# Patient Record
Sex: MALE | Race: WHITE | NOT HISPANIC OR LATINO | Employment: OTHER | URBAN - METROPOLITAN AREA
[De-identification: names, ages, dates, MRNs, and addresses within clinical notes are randomized per-mention and may not be internally consistent; named-entity substitution may affect disease eponyms.]

---

## 2017-03-13 ENCOUNTER — GENERIC CONVERSION - ENCOUNTER (OUTPATIENT)
Dept: FAMILY MEDICINE CLINIC | Facility: CLINIC | Age: 67
End: 2017-03-13

## 2017-03-13 ENCOUNTER — GENERIC CONVERSION - ENCOUNTER (OUTPATIENT)
Dept: OTHER | Facility: OTHER | Age: 67
End: 2017-03-13

## 2017-03-30 ENCOUNTER — ALLSCRIPTS OFFICE VISIT (OUTPATIENT)
Dept: OTHER | Facility: OTHER | Age: 67
End: 2017-03-30

## 2017-11-18 LAB
A/G RATIO (HISTORICAL): 1.6 (CALC) (ref 1–2.5)
ALBUMIN SERPL BCP-MCNC: 4.5 G/DL (ref 3.6–5.1)
ALP SERPL-CCNC: 55 U/L (ref 40–115)
ALT SERPL W P-5'-P-CCNC: 29 U/L (ref 9–46)
AST SERPL W P-5'-P-CCNC: 16 U/L (ref 10–35)
BILIRUB SERPL-MCNC: 1.5 MG/DL (ref 0.2–1.2)
BUN SERPL-MCNC: 16 MG/DL (ref 7–25)
BUN/CREA RATIO (HISTORICAL): ABNORMAL (CALC) (ref 6–22)
CALCIUM (ADJUSTED FOR ALBUMIN) (HISTORICAL): 9.2 MG/DL (CALC) (ref 8.6–10.2)
CALCIUM SERPL-MCNC: 9.3 MG/DL (ref 8.6–10.3)
CHLORIDE SERPL-SCNC: 102 MMOL/L (ref 98–110)
CHOLEST SERPL-MCNC: 182 MG/DL
CHOLEST/HDLC SERPL: 4.3 (CALC)
CO2 SERPL-SCNC: 27 MMOL/L (ref 20–31)
CREAT SERPL-MCNC: 0.89 MG/DL (ref 0.7–1.25)
EGFR AFRICAN AMERICAN (HISTORICAL): 103 ML/MIN/1.73M2
EGFR-AMERICAN CALC (HISTORICAL): 89 ML/MIN/1.73M2
EST. AVERAGE GLUCOSE BLD GHB EST-MCNC: 177 (CALC)
EST. AVERAGE GLUCOSE BLD GHB EST-MCNC: 9.8 (CALC)
GAMMA GLOBULIN (HISTORICAL): 2.8 G/DL (CALC) (ref 1.9–3.7)
GLUCOSE (HISTORICAL): 189 MG/DL (ref 65–99)
HBA1C MFR BLD HPLC: 7.8 % OF TOTAL HGB
HDLC SERPL-MCNC: 42 MG/DL
LDL CHOLESTEROL (HISTORICAL): 103 MG/DL (CALC)
NON-HDL-CHOL (CHOL-HDL) (HISTORICAL): 140 MG/DL (CALC)
POTASSIUM SERPL-SCNC: 3.9 MMOL/L (ref 3.5–5.3)
SODIUM SERPL-SCNC: 139 MMOL/L (ref 135–146)
TOTAL PROTEIN (HISTORICAL): 7.3 G/DL (ref 6.1–8.1)
TRIGL SERPL-MCNC: 247 MG/DL

## 2017-12-04 ENCOUNTER — ALLSCRIPTS OFFICE VISIT (OUTPATIENT)
Dept: OTHER | Facility: OTHER | Age: 67
End: 2017-12-04

## 2017-12-06 NOTE — PROGRESS NOTES
Assessment    1  Benign essential hypertension (401 1) (I10)   2  Diabetes mellitus (250 00) (E11 9)   3  Combined hyperlipidemia (272 2) (E78 2)   4  Change in pigmented skin lesion of face (709 00) (L81 9)    Plan  Anxiety, Benign essential hypertension, PMH: CABG, Diabetes mellitus    · (1) CBC/PLT/DIFF; Status:Hold For - Exact Date; Requested for:After [id number];    · (1) COMPREHENSIVE METABOLIC PANEL; Status:Hold For - Exact Date; Requestedfor:After [id number];    · (1) HEMOGLOBIN A1C; Status:Hold For - Exact Date; Requested for:After [id number];    · (1) LIPID PANEL FASTING W DIRECT LDL REFLEX; Status:Hold For - Exact Date; Requested for:After [id number];    · (1) MICROALBUMIN CREATININE RATIO, RANDOM URINE; Status:Hold For - Exact Date; Requested for:After [id number];    · (1) TSH WITH FT4 REFLEX; Status:Hold For - Exact Date; Requested for:[id number]; Anxiety, Benign essential hypertension, PMH: CABG, Diabetes mellitus, PMH: Encounterfor prostate cancer screening    · (1) PSA (SCREEN) (Dx V76 44 Screen for Prostate Cancer); Status:Hold For - Exact Date; Requested for:After [id number];   Combined hyperlipidemia    · Atorvastatin Calcium 20 MG Oral Tablet; TAKE 1 TABLET DAILY AS DIRECTED  Combined hyperlipidemia, Diabetes mellitus    · (1) COMPREHENSIVE METABOLIC PANEL; Status:Hold For - Exact Date; Requestedfor:After [id number];    · (1) HEMOGLOBIN A1C; Status:Hold For - Exact Date; Requested for:After [id number];    · (1) LIPID PANEL FASTING W DIRECT LDL REFLEX; Status:Hold For - Exact Date; Requested for:After [id number];     Discussion/Summary    --DM: A1c 7 8%, was 7 2%  Patient states his diet has not been good lately with holloween  Continue same meds for now; glimepiride 8 mg and metformin 2000 mg  (Patient still has [employer] license)Cholesterol 678/133, triglycerides 247  Patient is agreeable to starting a statin today  Will start atorvastatin 20 mg daily  healing lesion L side face:  This is been present for at least a year and is nonhealing  Recommend schedule punch biopsy in near futureReasonably controlled on amlodipine/benazepril 10/20  in 4 months, following fasting blood work (for regular appointment and Medicare wellness visit)  Possible side effects of new medications were reviewed with the patient/guardian today  The treatment plan was reviewed with the patient/guardian  The patient/guardian understands and agrees with the treatment plan      Chief Complaint  Patient presents for a med check and to review BW results  Patient is here today for follow up of chronic conditions described in HPI  Review of Systems   Constitutional: No fever or chills, feels well, no tiredness, no recent weight gain or weight loss  Cardiovascular: No complaints of slow heart rate, no fast heart rate, no chest pain, no palpitations, no leg claudication, no lower extremity  Respiratory: No complaints of shortness of breath, no wheezing, no cough, no SOB on exertion, no orthopnea or PND  Gastrointestinal: No complaints of abdominal pain, no constipation, no nausea or vomiting, no diarrhea or bloody stools  Genitourinary: No complaints of dysuria, no incontinence, no hesitancy, no nocturia, no genital lesion, no testicular pain  Active Problems  1  Allergic rhinitis (477 9) (J30 9)   2  Anxiety (300 00) (F41 9)   3  Benign essential hypertension (401 1) (I10)   4  Diabetes mellitus (250 00) (E11 9)    Past Medical History    1  History of Acute bronchitis (466 0) (J20 9)   2  History of Cellulitis (682 9) (L03 90)   3  History of Contact dermatitis due to poison ivy (692 6) (L23 7)   4  History of Diabetes Mellitus (250 00)   5  History of Need for prophylactic vaccination and inoculation against influenza (V04 81) (Z23)   6  History of Screen for colon cancer (V76 51) (Z12 11)   7  History of Source Of Patient Information Was Medical Records   8   History of Visit For:    The active problems and past medical history were reviewed and updated today  Family History  Family History    1  Family history of No Significant Family History    Social History     · Former smoker (M81 84) (W47 820)  The social history was reviewed and updated today  The social history was reviewed and is unchanged  Current Meds   1  Amlodipine Besy-Benazepril HCl - 10-20 MG Oral Capsule; TAKE ONE CAPSULE BY MOUTH EVERY DAY; Therapy: [id number] to (Evaluate:18Apr2018)  Requested for: 47UWU0766; Last Rx:[id number] Ordered   2  Glimepiride 4 MG Oral Tablet; take 1 tablet by mouth twice a day; Therapy: 55BGC0467 to (Evaluate:18Apr2018)  Requested for: 00DOY5620; Last Rx:[id number] Ordered   3  MetFORMIN HCl - 1000 MG Oral Tablet; take 1 tablet by mouth twice a day; Therapy: 17CCU7468 to (Evaluate:18Apr2018)  Requested for: 45GHA1542; Last Rx:[id number] Ordered    The medication list was reviewed and updated today  Allergies  1  No Known Drug Allergies    Vitals  Vital Signs    Recorded: 84FBE5451 02:09PM   Temperature 98 F, Tympanic   Heart Rate 99   Pulse Quality Normal   Systolic 414, LUE, Sitting   Diastolic 78, LUE, Sitting   BP CUFF SIZE Large   Height 6 ft 3 in   Weight 276 lb 6 oz   BMI Calculated 34 54   BSA Calculated 2 52   O2 Saturation 97, RA       Physical Exam   Constitutional  General appearance: No acute distress, well appearing and well nourished  Pulmonary  Respiratory effort: No increased work of breathing or signs of respiratory distress  Auscultation of lungs: Clear to auscultation, equal breath sounds bilaterally, no wheezes, no rales, no rhonci  Cardiovascular  Palpation of heart: Normal PMI, no thrills  Auscultation of heart: Normal rate and rhythm, normal S1 and S2, without murmurs  Examination of extremities for edema and/or varicosities: Normal    Carotid pulses: Normal    Lymphatic  Palpation of lymph nodes in neck: No lymphadenopathy     Psychiatric  Orientation to person, place and time: Normal    Mood and affect: Normal          Signatures   Electronically signed by :  Jonathan Oh DO; Dec  4 2017  2:48PM EST                       (Author)

## 2017-12-14 ENCOUNTER — GENERIC CONVERSION - ENCOUNTER (OUTPATIENT)
Dept: OTHER | Facility: OTHER | Age: 67
End: 2017-12-14

## 2017-12-14 PROCEDURE — 88305 TISSUE EXAM BY PATHOLOGIST: CPT | Performed by: FAMILY MEDICINE

## 2017-12-15 ENCOUNTER — LAB REQUISITION (OUTPATIENT)
Dept: LAB | Facility: HOSPITAL | Age: 67
End: 2017-12-15
Payer: COMMERCIAL

## 2017-12-15 DIAGNOSIS — L81.9 DISORDER OF PIGMENTATION: ICD-10-CM

## 2017-12-21 ENCOUNTER — GENERIC CONVERSION - ENCOUNTER (OUTPATIENT)
Dept: OTHER | Facility: OTHER | Age: 67
End: 2017-12-21

## 2017-12-28 ENCOUNTER — GENERIC CONVERSION - ENCOUNTER (OUTPATIENT)
Dept: FAMILY MEDICINE CLINIC | Facility: CLINIC | Age: 67
End: 2017-12-28

## 2017-12-28 ENCOUNTER — GENERIC CONVERSION - ENCOUNTER (OUTPATIENT)
Dept: OTHER | Facility: OTHER | Age: 67
End: 2017-12-28

## 2018-01-13 VITALS
SYSTOLIC BLOOD PRESSURE: 136 MMHG | HEART RATE: 80 BPM | OXYGEN SATURATION: 96 % | DIASTOLIC BLOOD PRESSURE: 74 MMHG | TEMPERATURE: 97.8 F | BODY MASS INDEX: 34.38 KG/M2 | RESPIRATION RATE: 18 BRPM | HEIGHT: 75 IN | WEIGHT: 276.5 LBS

## 2018-01-23 VITALS
TEMPERATURE: 98 F | HEART RATE: 99 BPM | OXYGEN SATURATION: 97 % | SYSTOLIC BLOOD PRESSURE: 152 MMHG | HEIGHT: 75 IN | DIASTOLIC BLOOD PRESSURE: 78 MMHG | WEIGHT: 276.38 LBS | BODY MASS INDEX: 34.36 KG/M2

## 2018-01-24 VITALS
TEMPERATURE: 99 F | WEIGHT: 277 LBS | HEIGHT: 75 IN | SYSTOLIC BLOOD PRESSURE: 148 MMHG | HEART RATE: 103 BPM | DIASTOLIC BLOOD PRESSURE: 70 MMHG | RESPIRATION RATE: 18 BRPM | BODY MASS INDEX: 34.44 KG/M2 | OXYGEN SATURATION: 97 %

## 2018-01-24 VITALS
SYSTOLIC BLOOD PRESSURE: 146 MMHG | WEIGHT: 276.25 LBS | BODY MASS INDEX: 34.53 KG/M2 | OXYGEN SATURATION: 96 % | RESPIRATION RATE: 17 BRPM | DIASTOLIC BLOOD PRESSURE: 76 MMHG | TEMPERATURE: 98.2 F | HEART RATE: 100 BPM

## 2018-01-26 ENCOUNTER — TELEPHONE (OUTPATIENT)
Dept: FAMILY MEDICINE CLINIC | Facility: CLINIC | Age: 68
End: 2018-01-26

## 2018-01-26 DIAGNOSIS — E78.2 MIXED HYPERLIPIDEMIA: ICD-10-CM

## 2018-01-26 DIAGNOSIS — I10 ESSENTIAL HYPERTENSION: ICD-10-CM

## 2018-01-26 DIAGNOSIS — E11.9 TYPE 2 DIABETES MELLITUS WITHOUT COMPLICATION, WITHOUT LONG-TERM CURRENT USE OF INSULIN (HCC): Primary | ICD-10-CM

## 2018-01-26 PROBLEM — C44.310 BASAL CELL CARCINOMA OF FACE: Status: ACTIVE | Noted: 2017-12-21

## 2018-01-26 RX ORDER — AMLODIPINE BESYLATE AND BENAZEPRIL HYDROCHLORIDE 10; 20 MG/1; MG/1
1 CAPSULE ORAL DAILY
COMMUNITY
Start: 2013-10-18 | End: 2018-03-12 | Stop reason: SDUPTHER

## 2018-01-26 RX ORDER — ATORVASTATIN CALCIUM 20 MG/1
1 TABLET, FILM COATED ORAL DAILY
COMMUNITY
Start: 2017-12-04 | End: 2018-03-12 | Stop reason: SDUPTHER

## 2018-01-26 RX ORDER — GLIMEPIRIDE 4 MG/1
1 TABLET ORAL 2 TIMES DAILY
COMMUNITY
Start: 2013-07-03 | End: 2018-03-12 | Stop reason: SDUPTHER

## 2018-01-26 NOTE — TELEPHONE ENCOUNTER
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

## 2018-03-12 DIAGNOSIS — E11.8 TYPE 2 DIABETES MELLITUS WITH COMPLICATION, UNSPECIFIED LONG TERM INSULIN USE STATUS: Primary | ICD-10-CM

## 2018-03-12 DIAGNOSIS — E78.2 MIXED HYPERLIPIDEMIA: ICD-10-CM

## 2018-03-12 DIAGNOSIS — I10 ESSENTIAL HYPERTENSION: ICD-10-CM

## 2018-03-12 RX ORDER — GLIMEPIRIDE 4 MG/1
4 TABLET ORAL 2 TIMES DAILY
Qty: 60 TABLET | Refills: 2 | Status: SHIPPED | OUTPATIENT
Start: 2018-03-12 | End: 2018-05-15 | Stop reason: SDUPTHER

## 2018-03-12 RX ORDER — AMLODIPINE BESYLATE AND BENAZEPRIL HYDROCHLORIDE 10; 20 MG/1; MG/1
1 CAPSULE ORAL DAILY
Qty: 30 CAPSULE | Refills: 2 | Status: SHIPPED | OUTPATIENT
Start: 2018-03-12 | End: 2018-04-05 | Stop reason: SDUPTHER

## 2018-03-12 RX ORDER — ATORVASTATIN CALCIUM 20 MG/1
20 TABLET, FILM COATED ORAL DAILY
Qty: 30 TABLET | Refills: 2 | Status: SHIPPED | OUTPATIENT
Start: 2018-03-12 | End: 2018-08-28 | Stop reason: SDUPTHER

## 2018-03-18 LAB
LEFT EYE DIABETIC RETINOPATHY: NORMAL
RIGHT EYE DIABETIC RETINOPATHY: NORMAL

## 2018-03-18 PROCEDURE — 3072F LOW RISK FOR RETINOPATHY: CPT | Performed by: FAMILY MEDICINE

## 2018-03-30 LAB
ALBUMIN SERPL-MCNC: 4.3 G/DL (ref 3.6–5.1)
ALBUMIN/GLOB SERPL: 1.6 (CALC) (ref 1–2.5)
ALP SERPL-CCNC: 62 U/L (ref 40–115)
ALT SERPL-CCNC: 33 U/L (ref 9–46)
AST SERPL-CCNC: 19 U/L (ref 10–35)
BASOPHILS # BLD AUTO: 61 CELLS/UL (ref 0–200)
BASOPHILS NFR BLD AUTO: 1 %
BILIRUB SERPL-MCNC: 1.3 MG/DL (ref 0.2–1.2)
BUN SERPL-MCNC: 19 MG/DL (ref 7–25)
BUN/CREAT SERPL: ABNORMAL (CALC) (ref 6–22)
CALCIUM SERPL-MCNC: 9.4 MG/DL (ref 8.6–10.3)
CHLORIDE SERPL-SCNC: 104 MMOL/L (ref 98–110)
CHOLEST SERPL-MCNC: 110 MG/DL
CHOLEST/HDLC SERPL: 2.6 (CALC)
CO2 SERPL-SCNC: 29 MMOL/L (ref 20–31)
CREAT SERPL-MCNC: 0.93 MG/DL (ref 0.7–1.25)
EOSINOPHIL # BLD AUTO: 140 CELLS/UL (ref 15–500)
EOSINOPHIL NFR BLD AUTO: 2.3 %
ERYTHROCYTE [DISTWIDTH] IN BLOOD BY AUTOMATED COUNT: 13.1 % (ref 11–15)
GLOBULIN SER CALC-MCNC: 2.7 G/DL (CALC) (ref 1.9–3.7)
GLUCOSE SERPL-MCNC: 165 MG/DL (ref 65–99)
HBA1C MFR BLD: 8.2 % OF TOTAL HGB
HCT VFR BLD AUTO: 48 % (ref 38.5–50)
HDLC SERPL-MCNC: 43 MG/DL
HGB BLD-MCNC: 16.6 G/DL (ref 13.2–17.1)
LDLC SERPL CALC-MCNC: 43 MG/DL (CALC)
LYMPHOCYTES # BLD AUTO: 1928 CELLS/UL (ref 850–3900)
LYMPHOCYTES NFR BLD AUTO: 31.6 %
MCH RBC QN AUTO: 32 PG (ref 27–33)
MCHC RBC AUTO-ENTMCNC: 34.6 G/DL (ref 32–36)
MCV RBC AUTO: 92.7 FL (ref 80–100)
MONOCYTES # BLD AUTO: 586 CELLS/UL (ref 200–950)
MONOCYTES NFR BLD AUTO: 9.6 %
NEUTROPHILS # BLD AUTO: 3386 CELLS/UL (ref 1500–7800)
NEUTROPHILS NFR BLD AUTO: 55.5 %
NONHDLC SERPL-MCNC: 67 MG/DL (CALC)
PLATELET # BLD AUTO: 174 THOUSAND/UL (ref 140–400)
PMV BLD REES-ECKER: 9.8 FL (ref 7.5–12.5)
POTASSIUM SERPL-SCNC: 4.4 MMOL/L (ref 3.5–5.3)
PROT SERPL-MCNC: 7 G/DL (ref 6.1–8.1)
RBC # BLD AUTO: 5.18 MILLION/UL (ref 4.2–5.8)
SL AMB EGFR AFRICAN AMERICAN: 99 ML/MIN/1.73M2
SL AMB EGFR NON AFRICAN AMERICAN: 85 ML/MIN/1.73M2
SODIUM SERPL-SCNC: 141 MMOL/L (ref 135–146)
TRIGL SERPL-MCNC: 159 MG/DL
TSH SERPL-ACNC: 2.17 MIU/L (ref 0.4–4.5)
WBC # BLD AUTO: 6.1 THOUSAND/UL (ref 3.8–10.8)

## 2018-04-05 ENCOUNTER — OFFICE VISIT (OUTPATIENT)
Dept: FAMILY MEDICINE CLINIC | Facility: CLINIC | Age: 68
End: 2018-04-05
Payer: COMMERCIAL

## 2018-04-05 VITALS
TEMPERATURE: 98.2 F | BODY MASS INDEX: 34.39 KG/M2 | SYSTOLIC BLOOD PRESSURE: 124 MMHG | OXYGEN SATURATION: 95 % | WEIGHT: 275.1 LBS | DIASTOLIC BLOOD PRESSURE: 68 MMHG | HEART RATE: 95 BPM

## 2018-04-05 DIAGNOSIS — C44.310 BASAL CELL CARCINOMA OF FACE: Primary | ICD-10-CM

## 2018-04-05 DIAGNOSIS — E78.2 COMBINED HYPERLIPIDEMIA: ICD-10-CM

## 2018-04-05 DIAGNOSIS — Z00.00 WELCOME TO MEDICARE PREVENTIVE VISIT: ICD-10-CM

## 2018-04-05 DIAGNOSIS — I10 BENIGN ESSENTIAL HYPERTENSION: ICD-10-CM

## 2018-04-05 DIAGNOSIS — E11.8 TYPE 2 DIABETES MELLITUS WITH COMPLICATION, UNSPECIFIED LONG TERM INSULIN USE STATUS: ICD-10-CM

## 2018-04-05 PROCEDURE — G0438 PPPS, INITIAL VISIT: HCPCS | Performed by: FAMILY MEDICINE

## 2018-04-05 PROCEDURE — 3725F SCREEN DEPRESSION PERFORMED: CPT | Performed by: FAMILY MEDICINE

## 2018-04-05 PROCEDURE — 1101F PT FALLS ASSESS-DOCD LE1/YR: CPT | Performed by: FAMILY MEDICINE

## 2018-04-05 PROCEDURE — 99214 OFFICE O/P EST MOD 30 MIN: CPT | Performed by: FAMILY MEDICINE

## 2018-04-05 RX ORDER — AMLODIPINE BESYLATE 10 MG/1
10 TABLET ORAL DAILY
Qty: 30 TABLET | Refills: 5 | Status: SHIPPED | OUTPATIENT
Start: 2018-04-05 | End: 2018-05-29 | Stop reason: SDUPTHER

## 2018-04-05 RX ORDER — BENAZEPRIL HYDROCHLORIDE 20 MG/1
20 TABLET ORAL DAILY
Qty: 30 TABLET | Refills: 5 | Status: SHIPPED | OUTPATIENT
Start: 2018-04-05 | End: 2018-05-29 | Stop reason: SDUPTHER

## 2018-04-05 NOTE — ASSESSMENT & PLAN NOTE
Cholesterol 110/43  Much improved since starting statin, atorvastatin 20 mg daily    Will continue to monitor

## 2018-04-05 NOTE — PROGRESS NOTES
Assessment/Plan:    Basal cell carcinoma of face  Fully excised  Patient no longer seeing Plastic surgery  Recommend sun precautions    Benign essential hypertension  Originally high, but blood pressure improved after patient sat in room for approximately 15 minutes  Recommend continue amlodipine 10 and benazepril 20  Combined hyperlipidemia  Cholesterol 110/43  Much improved since starting statin, atorvastatin 20 mg daily  Will continue to monitor    Diabetes mellitus ([facility] [address] )  Still suboptimal   A1c now 8 2%, was 7 8%  Patient states his diet is still poor  His current meds include glimepiride 8 mg daily and metformin 2000 mg daily  Patient has a [employer] license, therefore we have been somewhat restricted on his choice of medications  Will refer to Endocrinology for further management    Welcome to Medicare preventive visit  Is a welcome to Medicare physical   Depression screen fall risk assessment were negative visual screening was performed today  Patient was given a living will and healthcare power of  along with freezer packet today  Patient continues to refuse all age appropriate vaccinations, colonoscopy, and PSA screening       Diagnoses and all orders for this visit:    Basal cell carcinoma of face    Benign essential hypertension  -     amLODIPine (NORVASC) 10 mg tablet; Take 1 tablet (10 mg total) by mouth daily  -     benazepril (LOTENSIN) 20 mg tablet; Take 1 tablet (20 mg total) by mouth daily    Type 2 diabetes mellitus with complication, unspecified long term insulin use status (HCC)  -     Comprehensive metabolic panel; Future  -     HEMOGLOBIN A1C W/ EAG ESTIMATION; Future  -     Microalbumin / creatinine urine ratio; Future  -     Ambulatory referral to Endocrinology; Future    Combined hyperlipidemia  -     Lipid Panel with Direct LDL reflex;  Future    Welcome to Medicare preventive visit        Four months, fasting blood work at [place] prior     Subjective:      Patient ID: Pearl Preciado Cecilia Keane is a 78 y o  male  Recheck chronic medical probs today  Overall feeling well  Doing well on all prescribed meds; atorvastatin 20 for chol, amlodipine/benazepril for bp, metformin and glimeperide for Dm  Pt had labs done at [facility]          The following portions of the patient's history were reviewed and updated as appropriate: allergies, current medications, past family history, past medical history, past social history, past surgical history and problem list     Review of Systems   Respiratory: Negative  Cardiovascular: Negative  Gastrointestinal: Negative  Genitourinary: Negative  Objective:      /68   Pulse 95   Temp 98 2 °F (36 8 °C) (Tympanic)   Wt 125 kg (275 lb 1 6 oz)   SpO2 95%   BMI 34 39 kg/m²          Physical Exam   Cardiovascular: Normal rate, regular rhythm, normal heart sounds and intact distal pulses  Carotids: no bruits  Ext: no edema   Pulmonary/Chest: Effort normal  No respiratory distress  He has no wheezes  He has no rales  Psychiatric: He has a normal mood and affect  His behavior is normal  Thought content normal            HPI:  Lb Jean is a 78 y o  male here for his IPPE(Welcome to Medicare Visit )    Patient Active Problem List   Diagnosis    Allergic rhinitis    Anxiety    Basal cell carcinoma of face    Benign essential hypertension    Coronary artery disease with history of myocardial infarction without history of CABG    Combined hyperlipidemia    [facility]    Welcome to Medicare preventive visit     Past Medical History:   Diagnosis Date    Cellulitis     last assessed 7/3/13    Diabetes ([facility] [address] )     last assessed 4/10/13    Gastroenteritis     last assessed 3/30/17    Rhus dermatitis     last assessed 8/26/15    Skin lesion of face     last assessed 3/30/17     History reviewed  No pertinent surgical history    Family History   Problem Relation Age of Onset    No Known Problems Family      History Smoking Status    Former Smoker   Smokeless Tobacco    Never Used     History   Alcohol Use    Yes      History   Drug Use No     /68   Pulse 95   Temp 98 2 °F (36 8 °C) (Tympanic)   Wt 125 kg (275 lb 1 6 oz)   SpO2 95%   BMI 34 39 kg/m²       Current Outpatient Prescriptions   Medication Sig Dispense Refill    atorvastatin (LIPITOR) 20 mg tablet Take 1 tablet (20 mg total) by mouth daily 30 tablet 2    glimepiride (AMARYL) 4 mg tablet Take 1 tablet (4 mg total) by mouth 2 (two) times a day 60 tablet 2    metFORMIN (GLUCOPHAGE) 1000 MG tablet Take 1 tablet (1,000 mg total) by mouth 2 (two) times a day 60 tablet 2    amLODIPine (NORVASC) 10 mg tablet Take 1 tablet (10 mg total) by mouth daily 30 tablet 5    benazepril (LOTENSIN) 20 mg tablet Take 1 tablet (20 mg total) by mouth daily 30 tablet 5     No current facility-administered medications for this visit  No Known Allergies  There is no immunization history for the selected administration types on file for this patient      Patient Care Team:  Jamie Villegas DO as PCP - General    Medicare Screening Tests and Risk Assessments:  AWV Clinical     ISAR:   Previous hospitalizations?:  No       Once in a Lifetime Medicare Screening:       Medicare Screening Tests and Risk Assessment:   AAA Risk Assessment    Age over 72 (males only):  Yes    Osteoporosis Risk Assessment    :  Yes    Age over 48:  Yes    HIV Risk Assessment    None indicated:  Yes        Drug and Alcohol Use:   Tobacco use    Cigarettes:  former smoker    Smokeless:  never used smokeless tobacco    Tobacco use duration    Tobacco Cessation Readiness    Alcohol use    Alcohol use:  rare use    Alcohol Treatment Readiness   Illicit Drug Use    Drug use:  never    Drug type:  no sedative use       Diet & Exercise:   Diet   What is your diet?:  Low Saturated Fat, Limited junk food, No Added Salt   How many servings a day of the following:   Fruits and Vegetables:  1-2 Meat: 1-2   Whole Grains:  1 Simple Carbs:  0   Dairy:  1 Soda:  0   Coffee:  0 Tea:  0   Exercise    Do you currently exercise?:  yes    Frequency:  occasional    Type of exercise:  walking       Cognitive Impairment Screening:   Depression screening preformed:  Yes Depression screen score:  0   Depression screening results:  negative for symptoms   Cognitive Impairment Screening    Do you have difficulty learning or retaining new information?:  No Do you have difficulty handling new tasks?:  No   Do you have difficulty with reasoning?:  No Do you have difficulty with spatial ability and orientation?:  No   Do you have difficulty with language?:  No Do you have difficulty with behavior?:  No       Functional Ability/Level of Safety:   Hearing    Hearing difficulties:  No Bilateral:  normal   Left:  normal Right:  normal   Hearing aid:  No    Hearing Impairment Assessment    Hearing status:  No impairment   Current Activities    Status:  unlimited driving, unlimited ADL's, unlimited IADL's, unlimited social activities   Help needed with the folllowing:    Using the phone:  No Transportation:  No   Shopping:  No Preparing Meals:  No   Doing Housework:  No Doing Laundry:  No   Managing Medications:  No Managing Money:  No   ADL    Fall Risk   Have you fallen in the last 12 months?:  No    Injury History   Polypharmacy:  Yes Antidepressant Use:  Yes   Sedative Use:  No Antihypertensive Use:  No   Previous Fall:  No Alcohol Use:  Yes   Deconditioning:  No Visual Impairment:  No   Cogitive Impairment:  No Mmobility Impairment:  No   Postural Hypotension:  No Urinary Incontinence:  No       Home Safety:   Home Safety Risk Factors   Unfamilar with surroundings:  No Uneven floors:  No   Stairs or handrail saftey risk:  No Loose rugs:  No   Household clutter:  No Poor household lighting:  No   No grab bars in bathroom:  No Further evaluation needed:  No       Advanced Directives:   Advanced Directives    Living Will:  No Durable POA for healthcare:  No   Advanced directive:  No    Patient's End of Life Decisions        Urinary Incontinence:   Do you have urinary incontinence?:  No        Glaucoma:            Provider Screening     Preventative Screening/Counseling:   Cardiovascular Screening/Counseling:   (Labs Q5 years, EKG optional one-time)   General:  Risks and Benefits Discussed           Diabetes Screening/Counseling:   (2 tests/year if Pre-Diabetes or 1 test/year if no Diabetes)   General:  Risks and Benefits Discussed           Colorectal Cancer Screening/Counseling:   (FOBT Q1 yr; Flex Sig Q4 yrs or Q10 yrs after Screening Colonoscopy; Screening Colonoscpy Q2 yrs High Risk or Q10 yrs Low Risk; Barium Enema Q2 yrs High Risk or Q4 yrs Low Risk)   General:  Risks and Benefits Discussed           Prostate Cancer Screening/Counseling:   (Annual)    General:  Risks and Benefits Discussed          Breast Cancer Screening/Counseling:   (Baseline Age 28 - 43; Annual Age 36+)         Cervical Cancer Screening/Counseling:   (Annual for High Risk or Childbearing Age with Abnormal Pap in Last 3 yrs; Every 2 all others)         Osteoporosis Screening/Counseling:   (Every 2 Yrs if at risk or more if medically necessary)   General:  Risks and Benefits Discussed           AAA Screening/Counseling:   (Once per Lifetime with risk factors)     Age over 72 (males only):  Yes    General:  Risks and Benefits Discussed           Glaucoma Screening/Counseling:   (Annual)   General:  Risks and Benefits Discussed          HIV Screening/Counseling:   (Voluntary;  Once annually for high risk OR 3 times for Pregnancy at diagnosis of IUP; 3rd trimester; and at Labor   General:  Risks and Benefits Discussed           Hepatitis C Screening:             Immunizations:   Influenza (annual):  Risks & Benefits Discussed, Patient Declines   Pneumococcal (Once in a Lifetime):  Risks & Benefits Discussed, Patient Declines   Zostavax (Medicare D Coverage, Pt >72 yo): Risks & Benefits Discussed, Patient Declines   TD (Non-Medicare Wellness  Visit required):  Risks & Benefits Discussed       Other Preventative Couseling (Non-Medicare Wellness Visit Required):   nutrition counseling performed       Referrals (Non-Medicare Wellness Visit Required):   endocrinology consult ordered       Medical Equipment/Suppliers:           No exam data present    Physical Exam :  Physical Exam    Reviewed Updated [facility] Prior Wellness Visits:   Last Medicare wellness visit information was reviewed, patient interviewed , no change since last AWVyes  Last Medicare wellness visit information was reviewed, patient interviewed and updates made to the record today yes    Assessment and Plan:  1  Basal cell carcinoma of face     2  Benign essential hypertension  amLODIPine (NORVASC) 10 mg tablet    benazepril (LOTENSIN) 20 mg tablet   3  Type 2 diabetes mellitus with complication, unspecified long term insulin use status (HCC)  Comprehensive metabolic panel    HEMOGLOBIN A1C W/ EAG ESTIMATION    Microalbumin / creatinine urine ratio    Ambulatory referral to Endocrinology   4  Combined hyperlipidemia  Lipid Panel with Direct LDL reflex   5   Welcome to Medicare preventive visit         Health Maintenance Due   Topic Date Due    Hepatitis C Screening  1950    DTaP,Tdap,and Td Vaccines (1 - Tdap) 07/03/1972    URINE MICROALBUMIN  09/28/2013    ABDOMINAL AORTIC ANEURYSM (AAA) SCREEN  07/03/2016    PNEUMOCOCCAL POLYSACCHARIDE VACCINE AGE 72 AND OVER  07/03/2016    OPHTHALMOLOGY EXAM  03/13/2018

## 2018-04-05 NOTE — ASSESSMENT & PLAN NOTE
Originally high, but blood pressure improved after patient sat in room for approximately 15 minutes  Recommend continue amlodipine 10 and benazepril 20

## 2018-04-05 NOTE — ASSESSMENT & PLAN NOTE
Is a welcome to Medicare physical   Depression screen fall risk assessment were negative visual screening was performed today  Patient was given a living will and healthcare power of  along with freezer packet today    Patient continues to refuse all age appropriate vaccinations, colonoscopy, and PSA screening

## 2018-04-05 NOTE — ASSESSMENT & PLAN NOTE
Still suboptimal   A1c now 8 2%, was 7 8%  Patient states his diet is still poor  His current meds include glimepiride 8 mg daily and metformin 2000 mg daily  Patient has a [employer] [place] license, therefore we have been somewhat restricted on his choice of medications    Will refer to Endocrinology for further management

## 2018-04-27 ENCOUNTER — OFFICE VISIT (OUTPATIENT)
Dept: ENDOCRINOLOGY | Facility: CLINIC | Age: 68
End: 2018-04-27
Payer: COMMERCIAL

## 2018-04-27 VITALS
DIASTOLIC BLOOD PRESSURE: 72 MMHG | HEIGHT: 73 IN | BODY MASS INDEX: 36.58 KG/M2 | HEART RATE: 88 BPM | SYSTOLIC BLOOD PRESSURE: 150 MMHG | WEIGHT: 276 LBS

## 2018-04-27 DIAGNOSIS — I10 BENIGN ESSENTIAL HYPERTENSION: ICD-10-CM

## 2018-04-27 DIAGNOSIS — E78.2 COMBINED HYPERLIPIDEMIA: ICD-10-CM

## 2018-04-27 DIAGNOSIS — G47.33 OBSTRUCTIVE SLEEP APNEA SYNDROME: ICD-10-CM

## 2018-04-27 DIAGNOSIS — E11.8 TYPE 2 DIABETES MELLITUS WITH COMPLICATION (HCC): Primary | ICD-10-CM

## 2018-04-27 PROCEDURE — 99205 OFFICE O/P NEW HI 60 MIN: CPT | Performed by: INTERNAL MEDICINE

## 2018-04-27 NOTE — LETTER
April 27, 2018     Juli Hoang, DO  [facility]  [address]    Patient: Lb Jean   YOB: 1950   Date of Visit: 4/27/2018       Dear Dr Andrew Lewis:    Thank you for referring Del Powell to me for evaluation  Below are my notes for this consultation  If you have questions, please do not hesitate to call me  I look forward to following your patient along with you  Sincerely,        Tally Leventhal, MD        CC: No Recipients  Tally Leventhal, MD  4/27/2018  9:37 AM  Sign at close encounter  his Lb Jean 78 y o  male MRN: [id number]    Encounter: [id number]      Assessment/Plan     Assessment: This is a 78y o -year-old male with diabetes with hyperglycemia, hypertension and hyperlipidemia  He also has obstructive sleep apnea  Plan:  1  Type 2 diabetes with hyperglycemia-based on hemoglobin A1c his diabetes has been worsening over the last several months  I have recommended diabetes self-management training and medical nutrition therapy which he is agreeable to  He was started on Victoza which he will titrate to 1 8 mg per day  I did go over the side effects of nausea and abdominal discomfort with this medication  He is to check his blood sugars at least once a day and send them to me in a few weeks so I can make further adjustments  I gave him prescriptions for hemoglobin A1c prior to his next visit  2   Hypertension -blood pressure is elevated today  Continue to monitor  If his blood pressure is elevated at the next visit, he will need adjustment in his medications  3   Hyperlipidemia-continue statin  4   Obstructive sleep apnea-I have referred him to Dr Diandra Johnson for evaluation and treatment  CC: Diabetes    History of Present Illness     HPI:  79-year-old male with type 2 diabetes for 20 years who is currently on metformin and glimepiride  He denies any hypoglycemia  He does complain of some numbness and tingling in the feet    He denies any retinopathy, nephropathy, heart attack, stroke in claudication  His mother has type 2 diabetes  He has noticed that his blood sugars have worsened over the last few months  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)     last assessed 4/10/13    Gastroenteritis     last assessed 3/30/17    Rhus dermatitis     last assessed 8/26/15    Skin lesion of face     last assessed 3/30/17     No past surgical history on file  Social History   History   Alcohol Use    Yes     History   Drug Use No     History   Smoking Status    Former Smoker    Types: Cigarettes   Smokeless Tobacco    Never Used     Family History:   Family History   Problem Relation Age of Onset    No Known Problems Family     Diabetes unspecified Mother     No Known Problems Father        Meds/Allergies   Current Outpatient Prescriptions   Medication Sig Dispense Refill    amLODIPine (NORVASC) 10 mg tablet Take 1 tablet (10 mg total) by mouth daily 30 tablet 5    atorvastatin (LIPITOR) 20 mg tablet Take 1 tablet (20 mg total) by mouth daily 30 tablet 2    benazepril (LOTENSIN) 20 mg tablet Take 1 tablet (20 mg total) by mouth daily 30 tablet 5    glimepiride (AMARYL) 4 mg tablet Take 1 tablet (4 mg total) by mouth 2 (two) times a day 60 tablet 2    metFORMIN (GLUCOPHAGE) 1000 MG tablet Take 1 tablet (1,000 mg total) by mouth 2 (two) times a day 60 tablet 2     No current facility-administered medications for this visit  No Known Allergies    Objective   Vitals: Blood pressure 150/72, pulse 88, height 6' 1" (1 854 m), weight 125 kg (276 lb)  Physical Exam   Constitutional: He is oriented to person, place, and time  He appears well-developed and well-nourished  No distress  HENT:   Head: Normocephalic and atraumatic  Mouth/Throat: Oropharynx is clear and moist and mucous membranes are normal  No oropharyngeal exudate  Eyes: Conjunctivae, EOM and lids are normal  Right eye exhibits no discharge  Left eye exhibits no discharge  No scleral icterus  Neck: Neck supple  No thyromegaly present  His neck size is 19 5 in  Cardiovascular: Normal rate, regular rhythm and normal heart sounds  Exam reveals no gallop and no friction rub  Pulses are no weak pulses  No murmur heard  Pulses:       Dorsalis pedis pulses are 1+ on the right side, and 1+ on the left side  Pulmonary/Chest: Effort normal and breath sounds normal  No respiratory distress  He has no wheezes  Abdominal: Soft  Bowel sounds are normal  He exhibits no distension  There is no tenderness  Musculoskeletal: Normal range of motion  He exhibits no edema, tenderness or deformity  Feet:   Right Foot:   Skin Integrity: Negative for ulcer, skin breakdown, erythema, warmth, callus or dry skin  Left Foot:   Skin Integrity: Negative for ulcer, skin breakdown, erythema, warmth, callus or dry skin  Lymphadenopathy:        Head (right side): No occipital adenopathy present  Head (left side): No occipital adenopathy present  Right: No supraclavicular adenopathy present  Left: No supraclavicular adenopathy present  Neurological: He is alert and oriented to person, place, and time  No cranial nerve deficit  Coordination normal    Skin: Skin is warm and intact  No rash noted  He is not diaphoretic  No erythema  Psychiatric: He has a normal mood and affect  His behavior is normal    Vitals reviewed      Patient's shoes and socks removed  Right Foot/Ankle   Right Foot Inspection  Skin Exam: skin normal and skin intact no dry skin, no warmth, no callus, no erythema, no maceration, no abnormal color, no pre-ulcer, no ulcer and no callus                            Sensory   Vibration: diminished    Monofilament testing: intact  Vascular    The right DP pulse is 1+  Left Foot/Ankle  Left Foot Inspection  Skin Exam: skin intactno dry skin, no warmth, no erythema, no maceration, normal color, no pre-ulcer, no ulcer and no callus                                         Sensory   Vibration: diminished    Monofilament: intact  Vascular    The left DP pulse is 1+  Assign Risk Category:  No deformity present; No loss of protective sensation; No weak pulses       Risk: 0    The history was obtained from the review of the chart, patient  Lab Results:   Lab Results   Component Value Date/Time    Hemoglobin A1C 8 2 (H) 03/29/2018 07:41 AM    Hemoglobin A1C 7 8 (H) 11/17/2017 08:33 AM    Hemoglobin 16 6 03/29/2018 07:41 AM    Hematocrit 48 0 03/29/2018 07:41 AM    MCV 92 7 03/29/2018 07:41 AM    Platelet Count 466 28/34/8303 07:41 AM    BUN 19 03/29/2018 07:41 AM    BUN 16 11/17/2017 08:33 AM    Sodium 139 11/17/2017 08:33 AM    Potassium 3 9 11/17/2017 08:33 AM    Chloride 102 11/17/2017 08:33 AM    CO2 27 11/17/2017 08:33 AM    Creatinine 0 89 11/17/2017 08:33 AM    Creatinine, Serum 0 93 03/29/2018 07:41 AM    AST 16 11/17/2017 08:33 AM    ALT 29 11/17/2017 08:33 AM    Albumin 4 5 11/17/2017 08:33 AM    Cholesterol 182 11/17/2017 08:33 AM    HDL 42 11/17/2017 08:33 AM    SL AMB LDL-CHOLESTEROL 43 03/29/2018 07:41 AM    Triglycerides 159 (H) 03/29/2018 07:41 AM    Triglycerides 247 (H) 11/17/2017 08:33 AM       Imaging Studies: I have personally reviewed pertinent reports  Portions of the record may have been created with voice recognition software   Occasional wrong word or "sound a like" substitutions may have occurred due to the inherent limitations of voice recognition software  Read the chart carefully and recognize, using context, where substitutions have occurred

## 2018-04-27 NOTE — PROGRESS NOTES
his Cherie Yi 78 y o  male MRN: [id number]    Encounter: [id number]      Assessment/Plan     Assessment: This is a 78y o -year-old male with diabetes with hyperglycemia, hypertension and hyperlipidemia  He also has obstructive sleep apnea  Plan:  1  Type 2 diabetes with hyperglycemia-based on hemoglobin A1c his diabetes has been worsening over the last several months  I have recommended diabetes self-management training and medical nutrition therapy which he is agreeable to  He was started on Victoza which he will titrate to 1 8 mg per day  I did go over the side effects of nausea and abdominal discomfort with this medication  He is to check his blood sugars at least once a day and send them to me in a few weeks so I can make further adjustments  I gave him prescriptions for hemoglobin A1c prior to his next visit  2   Hypertension -blood pressure is elevated today  Continue to monitor  If his blood pressure is elevated at the next visit, he will need adjustment in his medications  3   Hyperlipidemia-continue statin  4   Obstructive sleep apnea-I have referred him to Dr Leyla Brown for evaluation and treatment  CC: Diabetes    History of Present Illness     HPI:  60-year-old male with type 2 diabetes for 20 years who is currently on metformin and glimepiride  He denies any hypoglycemia  He does complain of some numbness and tingling in the feet  He denies any retinopathy, nephropathy, heart attack, stroke in claudication  His mother has type 2 diabetes  He has noticed that his blood sugars have worsened over the last few months  He does process 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 license  Review of Systems   Constitutional: Negative for chills and fever  Respiratory: Negative for shortness of breath  Cardiovascular: Negative for chest pain  Gastrointestinal: Negative for constipation, diarrhea, nausea and vomiting  Endocrine: Negative for polydipsia and polyuria     Neurological: Positive for numbness  All other systems reviewed and are negative  Historical Information   Past Medical History:   Diagnosis Date    Cellulitis     last assessed 7/3/13    Diabetes [facility])     last assessed 4/10/13    Gastroenteritis     last assessed 3/30/17    Rhus dermatitis     last assessed 8/26/15    Skin lesion of face     last assessed 3/30/17     No past surgical history on file  Social History   History   Alcohol Use    Yes     History   Drug Use No     History   Smoking Status    Former Smoker    Types: Cigarettes   Smokeless Tobacco    Never Used     Family History:   Family History   Problem Relation Age of Onset    No Known Problems Family     Diabetes unspecified Mother     No Known Problems Father        Meds/Allergies   Current Outpatient Prescriptions   Medication Sig Dispense Refill    amLODIPine (NORVASC) 10 mg tablet Take 1 tablet (10 mg total) by mouth daily 30 tablet 5    atorvastatin (LIPITOR) 20 mg tablet Take 1 tablet (20 mg total) by mouth daily 30 tablet 2    benazepril (LOTENSIN) 20 mg tablet Take 1 tablet (20 mg total) by mouth daily 30 tablet 5    glimepiride (AMARYL) 4 mg tablet Take 1 tablet (4 mg total) by mouth 2 (two) times a day 60 tablet 2    metFORMIN (GLUCOPHAGE) 1000 MG tablet Take 1 tablet (1,000 mg total) by mouth 2 (two) times a day 60 tablet 2     No current facility-administered medications for this visit  No Known Allergies    Objective   Vitals: Blood pressure 150/72, pulse 88, height 6' 1" (1 854 m), weight 125 kg (276 lb)  Physical Exam   Constitutional: He is oriented to person, place, and time  He appears well-developed and well-nourished  No distress  HENT:   Head: Normocephalic and atraumatic  Mouth/Throat: Oropharynx is clear and moist and mucous membranes are normal  No oropharyngeal exudate  Eyes: Conjunctivae, EOM and lids are normal  Right eye exhibits no discharge  Left eye exhibits no discharge  No scleral icterus  Neck: Neck supple  No thyromegaly present  His neck size is 19 5 in  Cardiovascular: Normal rate, regular rhythm and normal heart sounds  Exam reveals no gallop and no friction rub  Pulses are no weak pulses  No murmur heard  Pulses:       Dorsalis pedis pulses are 1+ on the right side, and 1+ on the left side  Pulmonary/Chest: Effort normal and breath sounds normal  No respiratory distress  He has no wheezes  Abdominal: Soft  Bowel sounds are normal  He exhibits no distension  There is no tenderness  Musculoskeletal: Normal range of motion  He exhibits no edema, tenderness or deformity  Feet:   Right Foot:   Skin Integrity: Negative for ulcer, skin breakdown, erythema, warmth, callus or dry skin  Left Foot:   Skin Integrity: Negative for ulcer, skin breakdown, erythema, warmth, callus or dry skin  Lymphadenopathy:        Head (right side): No occipital adenopathy present  Head (left side): No occipital adenopathy present  Right: No supraclavicular adenopathy present  Left: No supraclavicular adenopathy present  Neurological: He is alert and oriented to person, place, and time  No cranial nerve deficit  Coordination normal    Skin: Skin is warm and intact  No rash noted  He is not diaphoretic  No erythema  Psychiatric: He has a normal mood and affect  His behavior is normal    Vitals reviewed  Patient's shoes and socks removed  Right Foot/Ankle   Right Foot Inspection  Skin Exam: skin normal and skin intact no dry skin, no warmth, no callus, no erythema, no maceration, no abnormal color, no pre-ulcer, no ulcer and no callus                            Sensory   Vibration: diminished    Monofilament testing: intact  Vascular    The right DP pulse is 1+       Left Foot/Ankle  Left Foot Inspection  Skin Exam: skin intactno dry skin, no warmth, no erythema, no maceration, normal color, no pre-ulcer, no ulcer and no callus                                         Sensory Vibration: diminished    Monofilament: intact  Vascular    The left DP pulse is 1+  Assign Risk Category:  No deformity present; No loss of protective sensation; No weak pulses       Risk: 0    The history was obtained from the review of the chart, patient  Lab Results:   Lab Results   Component Value Date/Time    Hemoglobin A1C 8 2 (H) 03/29/2018 07:41 AM    Hemoglobin A1C 7 8 (H) 11/17/2017 08:33 AM    Hemoglobin 16 6 03/29/2018 07:41 AM    Hematocrit 48 0 03/29/2018 07:41 AM    MCV 92 7 03/29/2018 07:41 AM    Platelet Count 421 65/48/6741 07:41 AM    BUN 19 03/29/2018 07:41 AM    BUN 16 11/17/2017 08:33 AM    Sodium 139 11/17/2017 08:33 AM    Potassium 3 9 11/17/2017 08:33 AM    Chloride 102 11/17/2017 08:33 AM    CO2 27 11/17/2017 08:33 AM    Creatinine 0 89 11/17/2017 08:33 AM    Creatinine, Serum 0 93 03/29/2018 07:41 AM    AST 16 11/17/2017 08:33 AM    ALT 29 11/17/2017 08:33 AM    Albumin 4 5 11/17/2017 08:33 AM    Cholesterol 182 11/17/2017 08:33 AM    HDL 42 11/17/2017 08:33 AM    SL AMB LDL-CHOLESTEROL 43 03/29/2018 07:41 AM    Triglycerides 159 (H) 03/29/2018 07:41 AM    Triglycerides 247 (H) 11/17/2017 08:33 AM       Imaging Studies: I have personally reviewed pertinent reports  Portions of the record may have been created with voice recognition software  Occasional wrong word or "sound a like" substitutions may have occurred due to the inherent limitations of voice recognition software  Read the chart carefully and recognize, using context, where substitutions have occurred

## 2018-05-11 DIAGNOSIS — Z79.4 TYPE 2 DIABETES MELLITUS WITH COMPLICATION, WITH LONG-TERM CURRENT USE OF INSULIN (HCC): ICD-10-CM

## 2018-05-11 DIAGNOSIS — E11.8 TYPE 2 DIABETES MELLITUS WITH COMPLICATION, WITH LONG-TERM CURRENT USE OF INSULIN (HCC): ICD-10-CM

## 2018-05-15 DIAGNOSIS — E11.8 TYPE 2 DIABETES MELLITUS WITH COMPLICATION, UNSPECIFIED WHETHER LONG TERM INSULIN USE: Primary | ICD-10-CM

## 2018-05-15 DIAGNOSIS — I10 BENIGN ESSENTIAL HYPERTENSION: ICD-10-CM

## 2018-05-16 ENCOUNTER — OFFICE VISIT (OUTPATIENT)
Dept: DIABETES SERVICES | Facility: CLINIC | Age: 68
End: 2018-05-16
Payer: COMMERCIAL

## 2018-05-16 ENCOUNTER — TELEPHONE (OUTPATIENT)
Dept: ENDOCRINOLOGY | Facility: CLINIC | Age: 68
End: 2018-05-16

## 2018-05-16 DIAGNOSIS — E11.65 TYPE 2 DIABETES MELLITUS WITH HYPERGLYCEMIA, WITHOUT LONG-TERM CURRENT USE OF INSULIN (HCC): Primary | ICD-10-CM

## 2018-05-16 PROCEDURE — G0108 DIAB MANAGE TRN  PER INDIV: HCPCS | Performed by: DIETITIAN, REGISTERED

## 2018-05-16 RX ORDER — BLOOD SUGAR DIAGNOSTIC
1 STRIP MISCELLANEOUS DAILY
Qty: 100 EACH | Refills: 3 | Status: SHIPPED | OUTPATIENT
Start: 2018-05-16 | End: 2018-07-11 | Stop reason: SDUPTHER

## 2018-05-16 RX ORDER — GLIMEPIRIDE 4 MG/1
4 TABLET ORAL 2 TIMES DAILY
Qty: 180 TABLET | Refills: 3 | Status: SHIPPED | OUTPATIENT
Start: 2018-05-16 | End: 2018-05-29 | Stop reason: SDUPTHER

## 2018-05-16 NOTE — PATIENT INSTRUCTIONS
1  Test blood sugars before and after meal on various days as instructed  2  Start exercising, particularly strength training  3   Eat 45 grams of carb at each meal; if more or less indicate on BG log sheet

## 2018-05-16 NOTE — PROGRESS NOTES
Type 2 Diabetes Class Assessment    HPI: Met with Di Session for DSME Initial visit  Tracy Nieves has Type 2 Diabetes, Neuropathy, HTN, HLD  He is taking his medications as prescribed  He is testing his BG once per day  All readings above target  He was instructed to do paired testing in order to find patterns of hyperglycemia  He was given an estimate of 45 grams carb per meal until his MNT appointment  Diabetes Assessment  Visit Type: Initial visit  Present at Session: patient   Special Learning Needs: No  Barriers to Learning: no barriers    How do you feel about making lifestyle changes at this time? Ready  How would you rate your current knowledge of diabetes? poor  How confident are you that will be able to take better control of your diabetes?: excellent    How long have you had diabetes? 20 years  Have you had diabetes education in the past?: Yes - In   Do you have any family members with diabetes?: Yes  Do you monitor your blood sugar? yes  Type of blood sugar monitor: One Touch Verio Flex  How old is your meter?: New  How often do you test your blood sugars?:1 x day  Do you keep a written record of your blood sugars? Yes   Blood sugar log with patient today and reviewed by educator?: Yes   Blood Sugar ranges:    Fastin-199   Before meals: 120-230   2 hours after meals: N/A  Any financial concerns pertaining to your diabetes supplies, medication or care?: Yes  Have you ever experienced hypoglycemia?:  Yes  Have you ever been hospitalized or gone to the ER due to your blood sugars?: No  How do you treat low blood sugars?: OJ or candy bar  How do you treat high blood sugars?  Nothing  Do you wear a Diabetes I D ?: no  Where do you dispose of your sharps (needles,lancetes)?: Jar    Ht Readings from Last 1 Encounters:   18 6' 1" (1 854 m)     Wt Readings from Last 3 Encounters:   18 125 kg (276 lb)   18 125 kg (275 lb 1 6 oz)   17 125 kg (276 lb 4 oz)        There is no height or weight on file to calculate BMI  Lab Results   Component Value Date    HGBA1C 8 2 (H) 03/29/2018    HGBA1C 7 8 (H) 11/17/2017    HGBA1C 7 2 (H) 03/28/2017       Lab Results   Component Value Date    CHOL 182 11/17/2017    CHOL 168 03/28/2017    CHOL 170 07/14/2016     Lab Results   Component Value Date    HDL 42 11/17/2017    HDL 45 03/28/2017    HDL 47 07/14/2016     No results found for: [facility]  Lab Results   Component Value Date    TRIG 159 (H) 03/29/2018    TRIG 247 (H) 11/17/2017    TRIG 161 (H) 03/28/2017     No components found for: CHOLHDL  No results found for: Emanuel Santos    Weight Change: No    Diet Assessment    Do you follow any special diet presently?: No  Who cooks at home?:  patient  Who does the grocery shopping?: patient   How frequently do you eat out?: 1 x week    Activity Assessment    Exercise: No    Lifestyle/Social Assessment    Racial/ethnic group:                                       Primary Language: English  Marital Status: Single  Education Level: High School Graduate   Work status: Retired  Type of job and hours:  occasionally  Who lives in your household?:   Who is you primary support person(s):    Describe your quality of life currently?: good  Any concerns for your safety?: No  Any Baptism or cultural practices that may affect your diabetes care: No  Do you have a decrease or loss of hearing?: No  Do you have a decrease or loss of vision?: Yes - cataracts  When was the last time you had an ophthalmology exam?: March  When was the last time you had dental exam?: Rarely  Do you check your feet for cracks, sores, debris?: Yes  When was the last time you had podiatry or foot exam?: Endo  Last flu shot?: None  Pneumonia shot?: No      The patient's history was reviewed and updated as appropriate: allergies, current medications      Intervention    Diabetes Overview :   Chance Thomas was instructed on basic concepts of diabetes, including identifying role of diabetes self management, basic pathophysiology and types of diabetes, A1c and blood sugar targets  [facility] has good understanding of material covered  Taking Medications: Instructed patient on action, side effects, efficacy, prescribed dosage and appropriate timing and frequency of administration of his diabetes medication  [facility] has good understanding of material covered  Monitoring Blood Sugars  Instructions for Meter Teaching- Patient instructed in the following:  Site selection and skin preparation, Loading strips and lancet device, meter activation, obtaining blood sample, test strip and lancet disposal and recording log book entries  Patient has good understanding of material covered  Testing frequency: Encouraged pair testing  Test sugars before a meal and 2hr after the same meal, rotating between breakfast, lunch, and dinner  Test sugars twice a day (7 days a week)  Goal Blood Sugars:   Premeal , even better <110  2hr after a meal <180, even better <140  A1C <7%, even better <6 5%  Hypoglycemia: Instructed patient on definition/risk of hypoglycemia, treatment, causes/symptoms, when to notify provider of lows, prevention of hypoglycemia and exercise precautions  Comments: Santy Callaway understanding of hypoglycemia concepts      Physical Activity: Discussed benefits of physical activity to optimize blood glucose control, encouraged activity at patient is physically able   Always consult a physician prior to starting an exercise program   Comments: Santy Callaway understanding of hypoglycemia concepts        Diabetes Education Record  [facility] received the following handouts: Know Your Blood Sugar Numbers, BG Log, Hypo/Hyperglycemia, 15/15 Rule, Diabetes Guidelines      Patient response to instruction    Comprehensionexcellent  Motivationexcellent  Expected Complianceexcellent  Response to Teachback: 100%, demonstrated understanding      Thank you for referring your patient to [facility], it was a pleasure working with them today  Please feel free to call with any questions or concerns      [address] [phone]

## 2018-05-23 ENCOUNTER — TELEPHONE (OUTPATIENT)
Dept: ENDOCRINOLOGY | Facility: CLINIC | Age: 68
End: 2018-05-23

## 2018-05-23 NOTE — TELEPHONE ENCOUNTER
Patient advised that the Victoza is working good for him but his next 2 refills it is going to cost him $800 and he can not afford that  He stated that he tried to get assistance from [employer] which is a government program and was told he could not get any assistance until Jan  So he wants to know can he stop the Victoza now and go back on it in Jan  And what can he take now in place of it?

## 2018-05-24 ENCOUNTER — TELEPHONE (OUTPATIENT)
Dept: ENDOCRINOLOGY | Facility: CLINIC | Age: 68
End: 2018-05-24

## 2018-05-24 NOTE — TELEPHONE ENCOUNTER
Left patient message advising that only other option would be to start on insulin and that may be expensive   Advised to call back and let me know what he would like to do

## 2018-05-24 NOTE — TELEPHONE ENCOUNTER
Patient called and stated that he has 3 months worth of the Victoza, but once that is gone he wants to know if he can stay off of it until December, which is when the patient assistance program will pay for the medication? And if so, is there a process to be taking off of the Victoza? He does not want to go on insulin right now

## 2018-05-25 NOTE — TELEPHONE ENCOUNTER
There is no process for coming off Victoza  He just stops taking it  I suspect he will have high blood sugar when he stops taking Victoza and does not start insulin

## 2018-05-29 ENCOUNTER — OFFICE VISIT (OUTPATIENT)
Dept: INTERNAL MEDICINE CLINIC | Age: 68
End: 2018-05-29
Payer: COMMERCIAL

## 2018-05-29 VITALS
DIASTOLIC BLOOD PRESSURE: 76 MMHG | HEIGHT: 73 IN | SYSTOLIC BLOOD PRESSURE: 148 MMHG | TEMPERATURE: 97.2 F | BODY MASS INDEX: 34.75 KG/M2 | WEIGHT: 262.2 LBS | OXYGEN SATURATION: 98 % | HEART RATE: 89 BPM

## 2018-05-29 DIAGNOSIS — E78.2 MIXED HYPERLIPIDEMIA: ICD-10-CM

## 2018-05-29 DIAGNOSIS — I10 BENIGN ESSENTIAL HYPERTENSION: ICD-10-CM

## 2018-05-29 DIAGNOSIS — E11.8 TYPE 2 DIABETES MELLITUS WITH COMPLICATION, UNSPECIFIED WHETHER LONG TERM INSULIN USE: ICD-10-CM

## 2018-05-29 PROCEDURE — 99204 OFFICE O/P NEW MOD 45 MIN: CPT | Performed by: INTERNAL MEDICINE

## 2018-05-29 RX ORDER — ATORVASTATIN CALCIUM 40 MG/1
40 TABLET, FILM COATED ORAL DAILY
Qty: 90 TABLET | Refills: 0 | Status: SHIPPED | OUTPATIENT
Start: 2018-05-29 | End: 2018-08-15 | Stop reason: ALTCHOICE

## 2018-05-29 RX ORDER — GABAPENTIN 100 MG/1
100 CAPSULE ORAL
Qty: 90 CAPSULE | Refills: 0 | Status: SHIPPED | OUTPATIENT
Start: 2018-05-29 | End: 2018-08-26 | Stop reason: SDUPTHER

## 2018-05-29 RX ORDER — AMLODIPINE BESYLATE 10 MG/1
10 TABLET ORAL DAILY
Qty: 90 TABLET | Refills: 1 | Status: SHIPPED | OUTPATIENT
Start: 2018-05-29 | End: 2018-08-15 | Stop reason: ALTCHOICE

## 2018-05-29 RX ORDER — GLIMEPIRIDE 4 MG/1
4 TABLET ORAL 2 TIMES DAILY
Qty: 180 TABLET | Refills: 1 | Status: SHIPPED | OUTPATIENT
Start: 2018-05-29 | End: 2018-12-13 | Stop reason: SDUPTHER

## 2018-05-29 RX ORDER — ATORVASTATIN CALCIUM 20 MG/1
20 TABLET, FILM COATED ORAL DAILY
Qty: 90 TABLET | Refills: 1 | Status: CANCELLED | OUTPATIENT
Start: 2018-05-29

## 2018-05-29 RX ORDER — BENAZEPRIL HYDROCHLORIDE 20 MG/1
20 TABLET ORAL DAILY
Qty: 90 TABLET | Refills: 1 | Status: SHIPPED | OUTPATIENT
Start: 2018-05-29 | End: 2018-08-15 | Stop reason: ALTCHOICE

## 2018-05-29 NOTE — PROGRESS NOTES
Assessment/Plan:    1  Hypertension  His BP was elevated today at 148/76 in the office  He is currently taking Lotensin 20 mg and Amlodipine 10 mg for his BP  His ASCVD 10 year risk was estimated at the office today at 29 6%  He was also advised to eliminate any salt, caffeine, and chocolate from his diet  2  Hyperlipidemia  His triglycerides were increased at 159 on 3/29/18  His total cholesterol was normal at 110, HDL was normal at 43, and LDL cholesterol was normal at 43 on 3/29/18  He is currently taking Atorvastatin 20 mg daily for his hyperlipidemia  He will increase his Atorvastatin to 40 mg daily  Because of increased  Risk of cad  3  Diabetes Mellitus  His HgbA1C was elevated at 8 2 on 3/29/18  His fasting blood sugar was elevated at 165 on 3/29/18  He is currently taking Glimepiride 4 mg BID, Metformin 1000 mg BID, and Victoza 18/0 3 mg/mL daily  He reports diabetic neuropathy in BLE, especially at night  He was instructed to take Neurontin 100 mg daily at bedtime for his neuropathy  In case of hypoglycemic episode, he was advised to carry around instant glucose tablets  He was advised to minimize processed foods and increase natural foods in his diet  He was encouraged to minimize his white starches, carbohydrates, and concentrated sugars from his diet  4  Possible B12 deficiency  His vibratory sense is decreased on his feet on physical exam  He will have a B12 level drawn at his earliest convenience  5  Bilateral Cataracts  He reports he has bilateral cataracts which his ophthalmologist recommended him to remove, but he has chosen to hold off on this as he is not able to financially afford it  6  Multiple Lipomas  He has multiple lipomas on his body  He has a freely movable lipoma above his anterior left shoulder crease  He has another lipoma on his upper back in the midline  7  Multiple Hernias   He reports he has a reducible umbilical hernia which was seen on exam  He also has a ventral hernia present which was also seen on physical exam  He was recommended that if the umbilical hernia becomes irreducible, he will need to see a surgeon  8  Health Maintenance  He will follow up with us in 2 months  Diagnoses and all orders for this visit:    Benign essential hypertension  -     amLODIPine (NORVASC) 10 mg tablet; Take 1 tablet (10 mg total) by mouth daily  -     benazepril (LOTENSIN) 20 mg tablet; Take 1 tablet (20 mg total) by mouth daily    Mixed hyperlipidemia    Type 2 diabetes mellitus with complication, unspecified whether long term insulin use (HCC)  -     glimepiride (AMARYL) 4 mg tablet; Take 1 tablet (4 mg total) by mouth 2 (two) times a day  -     metFORMIN (GLUCOPHAGE) 1000 MG tablet; Take 1 tablet (1,000 mg total) by mouth 2 (two) times a day    Other orders  -     Cancel: atorvastatin (LIPITOR) 20 mg tablet; Take 1 tablet (20 mg total) by mouth daily          Subjective:      Patient ID: Aleta Velasco is a 78 y o  male  Ron Minor is a 78year old male who presents today as a new patient to establish care at our practice  He has a history of Hypertension, hyperlipidemia, and DM  He is currently taking Lotensin 20 mg and Amlodipine 10 mg for his HTN  He is currently taking Lipitor 20 mg daily for his hyperlipidemia  He is currently taking Glimepiride 4 mg BID, Metformin 1000 mg BID, and Victoza 18/0 3 mg/mL daily  He is morbidly obese with a BMI of 34 6  He is following a diabetic diet on recommendation of [facility]'s Diabetic education  He notes he lost nearly 14 lbs since he started his diet  He was employed as a  and has not worked since July 2017             The following portions of the patient's history were reviewed and updated as appropriate: allergies, current medications, past family history, past medical history, past social history, past surgical history and problem list     Review of Systems   Constitutional: Positive for appetite change (Slightly decreaased appetite, patient suspects its secondary to medication) and unexpected weight change (14 lb weight loss, expected due to diet and medications)  Negative for activity change, chills, diaphoresis, fatigue and fever  HENT: Positive for dental problem (some rotting teeth, seeking dental insurance)  Negative for congestion, ear pain, hearing loss, nosebleeds, postnasal drip, sinus pain, sinus pressure, sore throat, tinnitus and trouble swallowing  Eyes: Positive for photophobia  Negative for pain, discharge, redness, itching and visual disturbance (Eye doctor did recommend removal of cataracts)  Respiratory: Negative for apnea, cough, choking, chest tightness, shortness of breath and wheezing  Cardiovascular: Negative for chest pain, palpitations and leg swelling  Gastrointestinal: Negative for abdominal pain, blood in stool, constipation (Decreased frequency of bowel movements'), diarrhea, nausea and vomiting  Endocrine: Negative for polydipsia, polyphagia and polyuria  Genitourinary: Negative for decreased urine volume, difficulty urinating, discharge, dysuria, flank pain, frequency, penile pain, penile swelling, scrotal swelling, testicular pain and urgency  Musculoskeletal: Positive for back pain (when lying down in bed)  Negative for arthralgias, gait problem, joint swelling, myalgias, neck pain and neck stiffness  Skin: Negative for color change, pallor, rash and wound  Allergic/Immunologic: Negative for environmental allergies and food allergies  Neurological: Positive for dizziness (due to instances of low blood sugar), light-headedness (due to instances of low blood sugar) and numbness (bilateral pins and needles in feet, worse on left)  Negative for tremors, syncope, weakness and headaches  Hematological: Bruises/bleeds easily (especially in the hands)  Psychiatric/Behavioral: Positive for agitation and sleep disturbance (during sleep, some leg shaking)   Negative for behavioral problems, confusion and dysphoric mood  The patient is not nervous/anxious  Objective:      /76 (BP Location: Left arm, Patient Position: Sitting, Cuff Size: Large)   Pulse 89   Temp (!) 97 2 °F (36 2 °C) (Tympanic)   Ht 6' 0 99" (1 854 m)   Wt 119 kg (262 lb 3 2 oz)   SpO2 98%   BMI 34 60 kg/m²          Physical Exam   Constitutional: He is oriented to person, place, and time  He appears well-developed and well-nourished  No distress  HENT:   Head: Atraumatic  Eyes: Conjunctivae and EOM are normal    Neck: Normal range of motion  Neck supple  No thyromegaly present  Cardiovascular: Normal rate and regular rhythm  No murmur heard  Pulmonary/Chest: No respiratory distress  He has no wheezes  He has no rales  Abdominal: Soft  Bowel sounds are normal  He exhibits distension  There is no rebound  Abdomen is distended  Soft with large ventral hernia  Associated umbilical hernia which is reducible  He had no palpable masses or organomegaly  Musculoskeletal: He exhibits no edema, tenderness or deformity  Neurological: He is alert and oriented to person, place, and time  Vibratory sense 128 in BLE is absent  DTRs are normal    Skin: Skin is warm and dry  No rash noted  He is not diaphoretic  No erythema  Acneiform scarring on chest and back  He had a large lipoma above his left shoulder crease  He has another lipoma on his upper back midline  Psychiatric: He has a normal mood and affect  His behavior is normal  Judgment and thought content normal          Scribe Signature and Attestation:  By signing my name below, Angela Hirsch attest that this documentation has been prepared under the direction and in the presence of Dr Lakshmi Arguelles MD  Electronically signed: Meera Burch  5/29/18    Provider Attestation:  I, Dr Lakshmi Arguelles, personally performed the services described in this documentation   All medical record entries made by the meera were at my direction and in my presence  I have reviewed the chart and discharge instructions (if applicable) and agree that the record reflects my personal performance and is accurate and complete  Dr Gage Love MD  5/29/18

## 2018-05-29 NOTE — PATIENT INSTRUCTIONS
1  He was also advised to eliminate any salt and caffeine from his diet to reduce his BP   1  He will increase his Atorvastatin to 40 mg daily  2  He was instructed to take Neurontin 100 mg daily at bedtime for his neuropathy  3   In case of hypoglycemic episode, he was advised to carry around instant glucose tablets  He was advised to minimize processed foods and increase natural foods in his diet  He was encouraged to minimize his white starches, carbohydrates, and concentrated sugars from his diet  4  He will have a B12 level drawn at his earliest convenience  5  He will follow up with us in 2 months

## 2018-05-30 NOTE — TELEPHONE ENCOUNTER
Spoke to patient and advised that once he stops Victoza he may start to have high BS, patient understood   He does not want to start insulin

## 2018-06-05 ENCOUNTER — OFFICE VISIT (OUTPATIENT)
Dept: DIABETES SERVICES | Facility: CLINIC | Age: 68
End: 2018-06-05
Payer: COMMERCIAL

## 2018-06-05 DIAGNOSIS — Z79.4 UNCONTROLLED TYPE 2 DIABETES MELLITUS WITH HYPERGLYCEMIA, WITH LONG-TERM CURRENT USE OF INSULIN (HCC): Primary | ICD-10-CM

## 2018-06-05 DIAGNOSIS — E11.65 UNCONTROLLED TYPE 2 DIABETES MELLITUS WITH HYPERGLYCEMIA, WITH LONG-TERM CURRENT USE OF INSULIN (HCC): Primary | ICD-10-CM

## 2018-06-05 PROCEDURE — G0109 DIAB MANAGE TRN IND/GROUP: HCPCS

## 2018-06-06 ENCOUNTER — OFFICE VISIT (OUTPATIENT)
Dept: DIABETES SERVICES | Facility: CLINIC | Age: 68
End: 2018-06-06
Payer: COMMERCIAL

## 2018-06-06 VITALS — WEIGHT: 267 LBS | BODY MASS INDEX: 35.39 KG/M2 | HEIGHT: 73 IN

## 2018-06-06 DIAGNOSIS — E11.40 TYPE 2 DIABETES MELLITUS WITH DIABETIC NEUROPATHY, WITHOUT LONG-TERM CURRENT USE OF INSULIN (HCC): Primary | ICD-10-CM

## 2018-06-06 DIAGNOSIS — I10 BENIGN ESSENTIAL HYPERTENSION: ICD-10-CM

## 2018-06-06 PROCEDURE — 97802 MEDICAL NUTRITION INDIV IN: CPT | Performed by: DIETITIAN, REGISTERED

## 2018-06-06 RX ORDER — BENAZEPRIL HYDROCHLORIDE 20 MG/1
20 TABLET ORAL DAILY
Qty: 90 TABLET | Refills: 1 | Status: CANCELLED | OUTPATIENT
Start: 2018-06-06

## 2018-06-06 NOTE — PATIENT INSTRUCTIONS
1  Continue checking blood sugars before and after different meals for 2 weeks, then start spot checking as instructed  2   Follow meal plan/count carbs; eat no more than 45 grams at breakfast, lunch and dinner and no > 15 grams for bedtime snack

## 2018-06-06 NOTE — PROGRESS NOTES
Medical Nutrition Therapy     Assessment    Visit Type: Initial visit  Chief complaint:  T2DM    HPI:  Patient is a 77 y/o male with T2DM, Neuropathy, HTN, HLD, Obesity  Patient has lost 10 lbs in the last month  He is taking his medications as prescribed  Patient stated that he may need to stop Victoza due to cost but encouraged to continue with medication  He stated that he does sometimes have hypoglycemia  He was advised to speak with his doctor about reducing dosage or discontinuing sulfonylurea  The patient stated that he has reduced his carb intake and portion sizes  His blood sugars are mostly at or close to target except for a few excursions for dietary indiscretions  His food intake is mostly processed foods but he does not cook and eats many canned foods that he reheats  Suggestions were given for including more nutritious foods that are easy to prepare or can be heated in the microwave  He was educated on carb counting/meal planning and was given a 1,957 calorie, low carb (30%) personal meal plan  He is attending Living Well classes and more information on meal planning and making better choices will be covered  Ht Readings from Last 1 Encounters:   06/06/18 6' 1" (1 854 m)     Wt Readings from Last 3 Encounters:   06/06/18 121 kg (267 lb)   05/29/18 119 kg (262 lb 3 2 oz)   04/27/18 125 kg (276 lb)        Body mass index is 35 23 kg/m²       Lab Results   Component Value Date    HGBA1C 8 2 (H) 03/29/2018    HGBA1C 7 8 (H) 11/17/2017    HGBA1C 7 2 (H) 03/28/2017       Lab Results   Component Value Date    CHOL 182 11/17/2017    CHOL 168 03/28/2017    CHOL 170 07/14/2016     Lab Results   Component Value Date    HDL 42 11/17/2017    HDL 45 03/28/2017    HDL 47 07/14/2016     No results found for: [facility]  Lab Results   Component Value Date    TRIG 159 (H) 03/29/2018    TRIG 247 (H) 11/17/2017    TRIG 161 (H) 03/28/2017     No components found for: CHOLHDL        Weight Change: Yes -10 lbs  Barriers to Learning: no barriers    Do you follow any special diet presently?: Yes  Who shops: patient  Who cooks: patient    Food Log: Completed via the method of food recall    Breakfast:Cheerios, milk, egg  Morning Snack:  Lunch:Beef and cheese sandwich, salad  Afternoon Snack: Cucumbers, tomatoes, onions olives with gelacio dressing  Dinner:Beef and cheese sandwich, salad  Evening Snack:[place] bar  Beverages: water  Eating out/Take out:1-2 x week  Exercise None      Nutrition Diagnosis:  Physical inactivity  related to Injury, lifestyle change, condition (i e  advanced stages of cardiovascular disease, obesity), physical disability or limitation that reduces physical activity or activities of daily living as evidenced by Obesity - BMI >30 (adults)    Intervention: behavior modification strategies, carbohydrate counting, meal planning, individualized meal plan and monitoring portion control     Treatment Goals: Patient will count carbohydrates    Monitoring and evaluation:    Term code indicator   1 6 3 Carbohydrate Intake Criteria: 45 grams breakfast, lunch and dinner, 15 grams bedtime snacks    Education Material Given  Earlyne Umberto was provided the following education material: [employer], Portion Book, Low carb snacks, 1957 low carb (30%) personal meal plan     Patients Response to Instruction  Comprehensionvery good  Motivationvery good  Expected Compliancevery good    Thank you for coming to the [facility] for education today  Please feel free to call with any questions or concerns      [address] [phone]

## 2018-06-06 NOTE — PROGRESS NOTES
Living Well with Diabetes Group Class #1    Freddieelyjaspal Fawad attended the Living Well with Diabetes Group Class #1  Topics Covered in class today include: What is diabetes; Types of Diabetes; How Diabetes is diagnosed; Management skills; the role of exercise in blood sugar managements, Home glucose monitoring, and target ranges  Edgar Cooper participated in group activities    The patient's history was reviewed and updated as appropriate: allergies, current medications  Lab Results   Component Value Date    HGBA1C 8 2 (H) 03/29/2018       Diabetes Education Record  Edgar Cooper was provided Living Well with Diabetes Class #1 book      Patient response to instruction    Comprehensiongood  Motivationgood  Expected Compliancegood    Begin Time: 9:30   End Time: 11:30  Referring Provider: Dr Izabela Farmer you for referring your patient to [facility], it was a pleasure working with them today  Please feel free to call with any questions or concerns      Gage Pete RN  [address] [phone]

## 2018-06-07 ENCOUNTER — TELEPHONE (OUTPATIENT)
Dept: ENDOCRINOLOGY | Facility: CLINIC | Age: 68
End: 2018-06-07

## 2018-06-08 ENCOUNTER — TELEPHONE (OUTPATIENT)
Dept: ENDOCRINOLOGY | Facility: CLINIC | Age: 68
End: 2018-06-08

## 2018-06-11 DIAGNOSIS — E11.8 TYPE 2 DIABETES MELLITUS WITH COMPLICATION, WITH LONG-TERM CURRENT USE OF INSULIN (HCC): ICD-10-CM

## 2018-06-11 DIAGNOSIS — Z79.4 TYPE 2 DIABETES MELLITUS WITH COMPLICATION, WITH LONG-TERM CURRENT USE OF INSULIN (HCC): ICD-10-CM

## 2018-06-11 NOTE — TELEPHONE ENCOUNTER
Samples were given today  Medication: Victoza  Quantity:2/2  Lot #:[id number]  Exp   Date: 09/2019 & 12/2018  :[employer]

## 2018-06-12 ENCOUNTER — OFFICE VISIT (OUTPATIENT)
Dept: DIABETES SERVICES | Facility: CLINIC | Age: 68
End: 2018-06-12
Payer: COMMERCIAL

## 2018-06-12 DIAGNOSIS — E11.65 TYPE 2 DIABETES MELLITUS WITH HYPERGLYCEMIA, WITHOUT LONG-TERM CURRENT USE OF INSULIN (HCC): Primary | ICD-10-CM

## 2018-06-12 DIAGNOSIS — E11.8 TYPE 2 DIABETES MELLITUS WITH COMPLICATION (HCC): ICD-10-CM

## 2018-06-12 PROCEDURE — G0109 DIAB MANAGE TRN IND/GROUP: HCPCS | Performed by: DIETITIAN, REGISTERED

## 2018-06-12 NOTE — PROGRESS NOTES
Living Well with Diabetes Group Class #2    Bernabe Garnica attended the Living Well with Diabetes Group Class #2  During class, Davin Riddle was instructed on the following topics: Macronutrients, Carbohydrate sources, What one serving of carbohydrate equals, effects of diet on blood glucose levels, effect of carbohydrates on blood glucose levels, basics of meal planning: balance, portions, meal times, measurements, reading food labels to determine carbohydrates  Davin Riddle participated in group activities of reading labels together and completing the meal planning activity  Davin Riddle will follow up with class #3  Will call with any questions or concerns prior to next session  The patient's history was reviewed and updated as appropriate: allergies, current medications  Lab Results   Component Value Date    HGBA1C 8 2 (H) 03/29/2018       Diabetes Education Record  Davin Riddle was provided Living Well with Diabetes Class #2 book      Patient response to instruction    Comprehensionvery good  Motivationvery good  Expected Compliancevery good      Thank you for referring your patient to [facility], it was a pleasure working with them today  Please feel free to call with any questions or concerns      Cherie Ruiz RD [facility] [phone]

## 2018-06-19 ENCOUNTER — OFFICE VISIT (OUTPATIENT)
Dept: DIABETES SERVICES | Facility: CLINIC | Age: 68
End: 2018-06-19
Payer: COMMERCIAL

## 2018-06-19 DIAGNOSIS — E11.65 UNCONTROLLED TYPE 2 DIABETES MELLITUS WITH HYPERGLYCEMIA, WITHOUT LONG-TERM CURRENT USE OF INSULIN (HCC): Primary | ICD-10-CM

## 2018-06-19 PROCEDURE — G0109 DIAB MANAGE TRN IND/GROUP: HCPCS

## 2018-06-19 NOTE — PROGRESS NOTES
Living Well with Diabetes Group Class #3    Elda Balderrama attended the Living Well with Diabetes Group Class #3  During class, Eleazar Rod was instructed on the following topics: Oral and injectable medications, short term complications of diabetes, long term complications of diabetes, prevention of complications, foot care, sick day management, stress management, and traveling with diabetes  Eleazar Rod participated in group activities  Eleazar Rod will follow up with class #4  Will call with any questions or concerns prior to next session  The patient's history was reviewed and updated as appropriate: allergies, current medications  Lab Results   Component Value Date    HGBA1C 8 2 (H) 03/29/2018       Diabetes Education Record  Eleazar Rod was provided Living Well with Diabetes Class #3 book      Patient response to instruction    Comprehensiongood  Motivationgood  Expected Compliancegood    Begin Time: 781  End Time: 2030  Referring Provider:  Dr Oseas Eaton you for referring your patient to [facility], it was a pleasure working with them today  Please feel free to call with any questions or concerns      Justina Cooper RN  [address] [phone]

## 2018-06-26 ENCOUNTER — OFFICE VISIT (OUTPATIENT)
Dept: DIABETES SERVICES | Facility: CLINIC | Age: 68
End: 2018-06-26
Payer: COMMERCIAL

## 2018-06-26 DIAGNOSIS — E11.40 TYPE 2 DIABETES MELLITUS WITH DIABETIC NEUROPATHY, WITHOUT LONG-TERM CURRENT USE OF INSULIN (HCC): Primary | ICD-10-CM

## 2018-06-26 PROCEDURE — G0109 DIAB MANAGE TRN IND/GROUP: HCPCS | Performed by: DIETITIAN, REGISTERED

## 2018-06-26 NOTE — PROGRESS NOTES
Living Well with Diabetes Group Class #4    Osiel Schwartz attended the Living Well with Diabetes Group Class #4  During class, Evelina Brandon was instructed on the following topics: Types of cholesterol, dietary sources of cholesterol, types of fat, types of fiber, reading food labels- in depth, healthy choices when dining out  Evelina Brandon participated in group activities of reading labels together and completed the dining out activity  Evelina Brandon will follow up with class #5 or additional DSMT/MNT as desired  Will call with any questions or concerns prior to next session  The patient's history was reviewed and updated as appropriate: allergies, current medications  Lab Results   Component Value Date    HGBA1C 8 2 (H) 03/29/2018       Diabetes Education Record  Evelina Brandon was provided Living Well with Diabetes Class #4 book      Patient response to instruction    Comprehensiongood  Motivationgood  Expected Compliancegood      Thank you for referring your patient to [facility], it was a pleasure working with them today  Please feel free to call with any questions or concerns      Mary Ann Singh RD [facility] [phone]

## 2018-07-11 DIAGNOSIS — E11.8 TYPE 2 DIABETES MELLITUS WITH COMPLICATION, UNSPECIFIED WHETHER LONG TERM INSULIN USE: ICD-10-CM

## 2018-07-11 DIAGNOSIS — Z79.4 TYPE 2 DIABETES MELLITUS WITH COMPLICATION, WITH LONG-TERM CURRENT USE OF INSULIN (HCC): ICD-10-CM

## 2018-07-11 DIAGNOSIS — E11.8 TYPE 2 DIABETES MELLITUS WITH COMPLICATION, WITH LONG-TERM CURRENT USE OF INSULIN (HCC): ICD-10-CM

## 2018-07-11 RX ORDER — BLOOD SUGAR DIAGNOSTIC
1 STRIP MISCELLANEOUS 3 TIMES DAILY
Qty: 100 EACH | Refills: 5 | Status: SHIPPED | OUTPATIENT
Start: 2018-07-11 | End: 2019-03-29 | Stop reason: SDUPTHER

## 2018-08-06 LAB
ALBUMIN SERPL-MCNC: 4.4 G/DL (ref 3.6–5.1)
ALBUMIN/CREAT UR: 12 MCG/MG CREAT
ALBUMIN/GLOB SERPL: 1.6 (CALC) (ref 1–2.5)
ALP SERPL-CCNC: 55 U/L (ref 40–115)
ALT SERPL-CCNC: 29 U/L (ref 9–46)
AST SERPL-CCNC: 17 U/L (ref 10–35)
BILIRUB SERPL-MCNC: 2.2 MG/DL (ref 0.2–1.2)
BUN SERPL-MCNC: 22 MG/DL (ref 7–25)
BUN/CREAT SERPL: ABNORMAL (CALC) (ref 6–22)
CALCIUM SERPL-MCNC: 9.7 MG/DL (ref 8.6–10.3)
CHLORIDE SERPL-SCNC: 104 MMOL/L (ref 98–110)
CHOLEST SERPL-MCNC: 96 MG/DL
CHOLEST/HDLC SERPL: 2.2 (CALC)
CO2 SERPL-SCNC: 25 MMOL/L (ref 20–31)
CREAT SERPL-MCNC: 0.98 MG/DL (ref 0.7–1.25)
CREAT UR-MCNC: 154 MG/DL (ref 20–370)
EST. AVERAGE GLUCOSE BLD GHB EST-MCNC: 148 (CALC)
EST. AVERAGE GLUCOSE BLD GHB EST-SCNC: 8.2 (CALC)
GLOBULIN SER CALC-MCNC: 2.7 G/DL (CALC) (ref 1.9–3.7)
GLUCOSE SERPL-MCNC: 161 MG/DL (ref 65–99)
HBA1C MFR BLD: 6.8 % OF TOTAL HGB
HDLC SERPL-MCNC: 43 MG/DL
LDLC SERPL CALC-MCNC: 32 MG/DL (CALC)
MICROALBUMIN UR-MCNC: 1.8 MG/DL
NONHDLC SERPL-MCNC: 53 MG/DL (CALC)
POTASSIUM SERPL-SCNC: 3.9 MMOL/L (ref 3.5–5.3)
PROT SERPL-MCNC: 7.1 G/DL (ref 6.1–8.1)
SL AMB EGFR AFRICAN AMERICAN: 92 ML/MIN/1.73M2
SL AMB EGFR NON AFRICAN AMERICAN: 79 ML/MIN/1.73M2
SODIUM SERPL-SCNC: 138 MMOL/L (ref 135–146)
TRIGL SERPL-MCNC: 119 MG/DL

## 2018-08-15 ENCOUNTER — OFFICE VISIT (OUTPATIENT)
Dept: INTERNAL MEDICINE CLINIC | Age: 68
End: 2018-08-15
Payer: COMMERCIAL

## 2018-08-15 VITALS
OXYGEN SATURATION: 97 % | DIASTOLIC BLOOD PRESSURE: 68 MMHG | WEIGHT: 260.6 LBS | HEIGHT: 73 IN | SYSTOLIC BLOOD PRESSURE: 138 MMHG | BODY MASS INDEX: 34.54 KG/M2 | TEMPERATURE: 97.5 F | HEART RATE: 81 BPM

## 2018-08-15 DIAGNOSIS — I10 ESSENTIAL HYPERTENSION: Primary | ICD-10-CM

## 2018-08-15 DIAGNOSIS — Z00.00 HEALTHCARE MAINTENANCE: ICD-10-CM

## 2018-08-15 DIAGNOSIS — M54.9 MID BACK PAIN: ICD-10-CM

## 2018-08-15 DIAGNOSIS — E11.36 TYPE 2 DIABETES MELLITUS WITH DIABETIC CATARACT, WITHOUT LONG-TERM CURRENT USE OF INSULIN (HCC): ICD-10-CM

## 2018-08-15 PROCEDURE — 99214 OFFICE O/P EST MOD 30 MIN: CPT | Performed by: INTERNAL MEDICINE

## 2018-08-15 RX ORDER — AMLODIPINE BESYLATE AND BENAZEPRIL HYDROCHLORIDE 10; 20 MG/1; MG/1
1 CAPSULE ORAL DAILY
Qty: 90 CAPSULE | Refills: 1 | Status: SHIPPED | OUTPATIENT
Start: 2018-08-15 | End: 2019-05-30 | Stop reason: SDUPTHER

## 2018-08-15 NOTE — PATIENT INSTRUCTIONS
1  He was advised to use moist heat on his back for pain relief  He will have a spine XR for further evaluation of this back pain  He will also have a Sed Rate drawn at his earliest convenience  2  He was advised to improve his diet and decrease his salt intake to improve his BP  He was given a prescription for Lotrel 10-20 mg daily for his BP  He will have an electrolyte panel drawn prior to his next appointment  3  He will be seeing a diabetician in December  He was advised to minimize processed foods and increase natural foods in his diet  He was encouraged to minimize his white starches, carbohydrates, and concentrated sugars from his diet  4  He will have a CBC drawn prior to his next appointment  5  He was instructed to increase his Gabapentin to 200 mg nightly because he has constantly been experiencing numbness in his feet  6  He was advised to let this toenail grow normally and see a podiatrist for further care  7  He was advised to have a prostate exam and a colonoscopy  He will have a PSA and a occult stool test performed prior to his next appointment  8  He will follow up with 3 months or as needed

## 2018-08-20 ENCOUNTER — TRANSCRIBE ORDERS (OUTPATIENT)
Dept: ADMINISTRATIVE | Facility: HOSPITAL | Age: 68
End: 2018-08-20

## 2018-08-20 ENCOUNTER — HOSPITAL ENCOUNTER (OUTPATIENT)
Dept: RADIOLOGY | Facility: HOSPITAL | Age: 68
Discharge: HOME | End: 2018-08-20
Payer: COMMERCIAL

## 2018-08-20 DIAGNOSIS — M54.9 MID BACK PAIN: ICD-10-CM

## 2018-08-20 PROCEDURE — 72072 X-RAY EXAM THORAC SPINE 3VWS: CPT

## 2018-08-20 PROCEDURE — 72110 X-RAY EXAM L-2 SPINE 4/>VWS: CPT

## 2018-08-21 DIAGNOSIS — Z12.11 COLON CANCER SCREENING: Primary | ICD-10-CM

## 2018-08-22 LAB
BASOPHILS # BLD AUTO: 52 CELLS/UL (ref 0–200)
BASOPHILS NFR BLD AUTO: 0.9 %
CHLORIDE SERPL-SCNC: 103 MMOL/L (ref 98–110)
CO2 SERPL-SCNC: 27 MMOL/L (ref 20–32)
EOSINOPHIL # BLD AUTO: 110 CELLS/UL (ref 15–500)
EOSINOPHIL NFR BLD AUTO: 1.9 %
ERYTHROCYTE [DISTWIDTH] IN BLOOD BY AUTOMATED COUNT: 13.6 % (ref 11–15)
ERYTHROCYTE [SEDIMENTATION RATE] IN BLOOD BY WESTERGREN METHOD: 2 MM/H
HCT VFR BLD AUTO: 45.8 % (ref 38.5–50)
HGB BLD-MCNC: 15.8 G/DL (ref 13.2–17.1)
LYMPHOCYTES # BLD AUTO: 1537 CELLS/UL (ref 850–3900)
LYMPHOCYTES NFR BLD AUTO: 26.5 %
MCH RBC QN AUTO: 31.9 PG (ref 27–33)
MCHC RBC AUTO-ENTMCNC: 34.5 G/DL (ref 32–36)
MCV RBC AUTO: 92.3 FL (ref 80–100)
MONOCYTES # BLD AUTO: 551 CELLS/UL (ref 200–950)
MONOCYTES NFR BLD AUTO: 9.5 %
NEUTROPHILS # BLD AUTO: 3550 CELLS/UL (ref 1500–7800)
NEUTROPHILS NFR BLD AUTO: 61.2 %
PLATELET # BLD AUTO: 183 THOUSAND/UL (ref 140–400)
PMV BLD REES-ECKER: 9.9 FL (ref 7.5–12.5)
POTASSIUM SERPL-SCNC: 4.1 MMOL/L (ref 3.5–5.3)
PSA SERPL-MCNC: 2 NG/ML
RBC # BLD AUTO: 4.96 MILLION/UL (ref 4.2–5.8)
SODIUM SERPL-SCNC: 139 MMOL/L (ref 135–146)
WBC # BLD AUTO: 5.8 THOUSAND/UL (ref 3.8–10.8)

## 2018-08-26 DIAGNOSIS — E78.2 MIXED HYPERLIPIDEMIA: ICD-10-CM

## 2018-08-26 DIAGNOSIS — E11.8 TYPE 2 DIABETES MELLITUS WITH COMPLICATION, UNSPECIFIED WHETHER LONG TERM INSULIN USE: ICD-10-CM

## 2018-08-28 RX ORDER — ATORVASTATIN CALCIUM 40 MG/1
TABLET, FILM COATED ORAL
Qty: 90 TABLET | Refills: 0 | Status: SHIPPED | OUTPATIENT
Start: 2018-08-28 | End: 2018-11-26 | Stop reason: SDUPTHER

## 2018-08-28 RX ORDER — GABAPENTIN 100 MG/1
100 CAPSULE ORAL
Qty: 90 CAPSULE | Refills: 0 | Status: SHIPPED | OUTPATIENT
Start: 2018-08-28 | End: 2018-11-26 | Stop reason: SDUPTHER

## 2018-09-05 ENCOUNTER — OFFICE VISIT (OUTPATIENT)
Dept: INTERNAL MEDICINE CLINIC | Age: 68
End: 2018-09-05
Payer: COMMERCIAL

## 2018-09-05 VITALS
SYSTOLIC BLOOD PRESSURE: 128 MMHG | OXYGEN SATURATION: 97 % | BODY MASS INDEX: 34.62 KG/M2 | WEIGHT: 261.2 LBS | HEART RATE: 94 BPM | HEIGHT: 73 IN | DIASTOLIC BLOOD PRESSURE: 72 MMHG | TEMPERATURE: 98.2 F

## 2018-09-05 DIAGNOSIS — E11.42 TYPE 2 DIABETES MELLITUS WITH DIABETIC POLYNEUROPATHY, WITHOUT LONG-TERM CURRENT USE OF INSULIN (HCC): ICD-10-CM

## 2018-09-05 DIAGNOSIS — I10 ESSENTIAL HYPERTENSION: ICD-10-CM

## 2018-09-05 DIAGNOSIS — G89.29 OTHER CHRONIC BACK PAIN: Primary | ICD-10-CM

## 2018-09-05 DIAGNOSIS — M54.9 OTHER CHRONIC BACK PAIN: Primary | ICD-10-CM

## 2018-09-05 PROCEDURE — 99213 OFFICE O/P EST LOW 20 MIN: CPT | Performed by: INTERNAL MEDICINE

## 2018-09-05 PROCEDURE — 3074F SYST BP LT 130 MM HG: CPT | Performed by: INTERNAL MEDICINE

## 2018-09-05 PROCEDURE — 3078F DIAST BP <80 MM HG: CPT | Performed by: INTERNAL MEDICINE

## 2018-09-05 PROCEDURE — 3008F BODY MASS INDEX DOCD: CPT | Performed by: INTERNAL MEDICINE

## 2018-09-05 NOTE — PROGRESS NOTES
Assessment/Plan:    1  Back Pain  He reports he continues to have minimal mid back pain more towards the right side  After his XR, he had a lot of pain and he took two tablets of aleve and two tablets of gabapentin  Since then, it has improved considerably and he has had very minimal pain  His XR showed Moderate degenerative facet disease at L4-L5 and L5-S1 bilaterally  Mild degenerative disease throughout the lumbar spine  There are flowing, bridging ossifications spanning the mid to lower thoracic spine, consistent with diffuse idiopathic skeletal hyperostosis (DISH ) He was given a referral to PT for his back  He can use Advil or Aleve when he has pain  He should continue taking gabapentin daily and double the dosage of it if his pain is worse  2  Diabetes Mellitus  He reports his blood glucose levels range from 119-234  He has not been following a strict diet  His endocrinologist, Dr Jonny Arambula, has instructed him to use Victoza daily in the morning  He will be changing his endocrinologist to Dr Cisco Zavala  We encouraged him to improve his diet and lose weight as that will help his sugar levels  He was advised to use a cycle for his exercise  3  Hypertension  His BP is stable at 128/72 today at the office  He will continue on his current medications as he is tolerating them without significant side effects  4  Healthcare Maintenance  He will follow up with us in 6-8 weeks or as needed  Diagnoses and all orders for this visit:    Other chronic back pain    Type 2 diabetes mellitus with diabetic polyneuropathy, without long-term current use of insulin ([email or web address] )    Essential hypertension          Subjective:      Patient ID: Hernan Li is a 80 y o  male  Lamin Churchill is a 80year old male who presents today for a 3 week follow up regarding his back pain and diabetes  He reports he continues to have minimal mid back pain more towards the right side   After his XR, he had a lot of pain and he took two tablets of aleve and two tablets of gabapentin  Since then, it has improved considerably and he has had very minimal pain  He reports his blood glucose levels range from 119-234  He has not been following a strict diet  His endocrinologist, Dr Maya Davis, has instructed him to use Victoza daily in the morning  He will be changing his endocrinologist to Dr Hua Collins  His eye doctor told him that he will need bilateral cataracts removed  He reports that when he wakes up in the morning, his eyes are blurry for a little bit before they clear up  He is waiting to do this as he states he can not afford it  He denies any respiratory  problems, CV problems, GI problems, urinary problems, neurological problems  Otherwise, he has no significant complaints and is doing well overall  The following portions of the patient's history were reviewed and updated as appropriate: allergies, current medications, past family history, past medical history, past social history, past surgical history and problem list     Review of Systems   Constitutional: Negative for activity change, appetite change, chills, diaphoresis, fatigue, fever and unexpected weight change  HENT: Negative for congestion, hearing loss, mouth sores, nosebleeds, postnasal drip, rhinorrhea, sinus pain, sinus pressure, tinnitus, trouble swallowing and voice change  Eyes: Negative  Has cataracts B/L   Respiratory: Negative  Cardiovascular: Negative  Gastrointestinal: Negative  Fecal blood  Neg  Genitourinary: Negative for dysuria, hematuria and urgency  Musculoskeletal: Positive for back pain  Skin: Negative  Neurological: Negative for dizziness, speech difficulty, weakness, light-headedness, numbness and headaches  Psychiatric/Behavioral: Negative            Objective:      /72 (BP Location: Left arm, Patient Position: Sitting, Cuff Size: Standard)   Pulse 94   Temp 98 2 °F (36 8 °C) (Tympanic)   Ht 6' 0 87" (1 851 m)   Wt 118 kg (261 lb 3 2 oz)   SpO2 97%   BMI 34 58 kg/m²          Physical Exam   Constitutional: He is oriented to person, place, and time  He appears well-developed and well-nourished  No distress  HENT:   Head: Normocephalic and atraumatic  Eyes: Conjunctivae and EOM are normal  Right eye exhibits no discharge  Left eye exhibits no discharge  No scleral icterus  Neck: Normal range of motion  Neck supple  Cardiovascular: Normal rate, regular rhythm and normal heart sounds  No murmur heard  Pulmonary/Chest: Effort normal and breath sounds normal  No respiratory distress  He has no wheezes  He has no rales  Abdominal: Soft  Bowel sounds are normal  He exhibits distension  There is no tenderness  There is no rebound and no guarding  Ventral and umbilical hernia   Musculoskeletal:   Neuro ok , no st  Leg tenderness   Neurological: He is oriented to person, place, and time  He has normal reflexes  Skin: Skin is warm and dry  No rash noted  He is not diaphoretic  No erythema  Scribe Signature and Attestation:  By signing my name below, Zan Bhatt attest that this documentation has been prepared under the direction and in the presence of Dr Gretta Galeazzi, MD  Electronically signed: Meera Lozano  9/5/18    Provider Attestation:  I, Dr Gretta Galeazzi, personally performed the services described in this documentation  All medical record entries made by the meera were at my direction and in my presence  I have reviewed the chart and discharge instructions (if applicable) and agree that the record reflects my personal performance and is accurate and complete  Dr Gretta Galeazzi, MD  9/5/18

## 2018-09-05 NOTE — PATIENT INSTRUCTIONS
1   He was given a referral to PT for his back  2  He can use Advil or Aleve when he has pain  He should continue taking gabapentin daily and double the dosage of it if his pain is worse  3  We encouraged him to improve his diet and lose weight as that will help his sugar levels  4  He was advised to use a cycle for his exercise  5  He will follow up with us in 6-8 weeks or as needed

## 2018-09-06 ENCOUNTER — TELEPHONE (OUTPATIENT)
Dept: INTERNAL MEDICINE CLINIC | Facility: CLINIC | Age: 68
End: 2018-09-06

## 2018-09-06 NOTE — TELEPHONE ENCOUNTER
Called patient with results of fecal occult blood testing  He stated that he was in the office yesterday and did not receive an AVS   I printed and mailed the AVS from 9/5/18 to his home address

## 2018-11-06 ENCOUNTER — TELEPHONE (OUTPATIENT)
Dept: INTERNAL MEDICINE CLINIC | Age: 68
End: 2018-11-06

## 2018-11-06 NOTE — TELEPHONE ENCOUNTER
Pt canceled 11/7/18 appt because he doesn't remember scheduling it  Would like to know if he should reschedule before seeing his diabetic dr or after

## 2018-11-08 ENCOUNTER — TELEPHONE (OUTPATIENT)
Dept: INTERNAL MEDICINE CLINIC | Age: 68
End: 2018-11-08

## 2018-11-08 DIAGNOSIS — Z79.4 TYPE 2 DIABETES MELLITUS WITH COMPLICATION, WITH LONG-TERM CURRENT USE OF INSULIN (HCC): ICD-10-CM

## 2018-11-08 DIAGNOSIS — E11.8 TYPE 2 DIABETES MELLITUS WITH COMPLICATION, WITH LONG-TERM CURRENT USE OF INSULIN (HCC): ICD-10-CM

## 2018-11-09 ENCOUNTER — TELEPHONE (OUTPATIENT)
Dept: ENDOCRINOLOGY | Facility: CLINIC | Age: 68
End: 2018-11-09

## 2018-11-09 NOTE — TELEPHONE ENCOUNTER
Patient would like a call back to discuss an issue the pharmacy is having with his prescription for Victosa(sp?)  He was using [facility] but he was accepted through the [facility] and now [facility] [address] in [place]  [facility] to tell [facility] that [facility] was written [facility]  [facility] number [phone]  Thank you

## 2018-11-09 NOTE — TELEPHONE ENCOUNTER
Called [facility], was advised prescription Victoza was sent in mL and that they only do in mg  advised the following information: inject 0 3 mL (1 8 mg total)     Called patient to advised that I called the pharmacy to give clarification for the Victoza and that they are getting the script ready for

## 2018-11-26 DIAGNOSIS — E11.8 TYPE 2 DIABETES MELLITUS WITH COMPLICATION, UNSPECIFIED WHETHER LONG TERM INSULIN USE: ICD-10-CM

## 2018-11-26 DIAGNOSIS — E78.2 MIXED HYPERLIPIDEMIA: ICD-10-CM

## 2018-11-27 RX ORDER — ATORVASTATIN CALCIUM 40 MG/1
TABLET, FILM COATED ORAL
Qty: 90 TABLET | Refills: 0 | Status: SHIPPED | OUTPATIENT
Start: 2018-11-27 | End: 2019-02-23 | Stop reason: SDUPTHER

## 2018-11-27 RX ORDER — GABAPENTIN 100 MG/1
100 CAPSULE ORAL
Qty: 90 CAPSULE | Refills: 0 | Status: SHIPPED | OUTPATIENT
Start: 2018-11-27 | End: 2019-07-30 | Stop reason: ALTCHOICE

## 2018-11-28 ENCOUNTER — OFFICE VISIT (OUTPATIENT)
Dept: INTERNAL MEDICINE CLINIC | Age: 68
End: 2018-11-28
Payer: COMMERCIAL

## 2018-11-28 VITALS
HEART RATE: 86 BPM | HEIGHT: 73 IN | DIASTOLIC BLOOD PRESSURE: 72 MMHG | OXYGEN SATURATION: 98 % | SYSTOLIC BLOOD PRESSURE: 146 MMHG | WEIGHT: 259.2 LBS | BODY MASS INDEX: 34.35 KG/M2 | TEMPERATURE: 98.2 F

## 2018-11-28 DIAGNOSIS — IMO0001 INSULIN DEPENDENT DIABETES MELLITUS: ICD-10-CM

## 2018-11-28 DIAGNOSIS — L57.0 KERATOSIS, ACTINIC: Primary | ICD-10-CM

## 2018-11-28 PROCEDURE — 1036F TOBACCO NON-USER: CPT | Performed by: INTERNAL MEDICINE

## 2018-11-28 PROCEDURE — 1160F RVW MEDS BY RX/DR IN RCRD: CPT | Performed by: INTERNAL MEDICINE

## 2018-11-28 PROCEDURE — 99214 OFFICE O/P EST MOD 30 MIN: CPT | Performed by: INTERNAL MEDICINE

## 2018-11-28 PROCEDURE — 3008F BODY MASS INDEX DOCD: CPT | Performed by: INTERNAL MEDICINE

## 2018-11-28 NOTE — PATIENT INSTRUCTIONS
1  He was advised to minimize processed foods and increase natural foods in his diet  He was encouraged to minimize his white starches, carbohydrates, and concentrated sugars from his diet  He will have an HgbA1C and fasting blood sugar drawn prior to his next appointment  2  He was advised to use Flonase to keep his nostrils moisturized  3  He was instructed to use Hydrocortisone cream topically on this  He was also given a referral to a dermatologist for further evaluation and treatment for this  4  He will have a Hepatitis C screening as he falls in the right age bracket  5  He declined to have the Prevnar 13 vaccination today at the office  6  He will follow up with us in 3 months or as needed

## 2018-11-28 NOTE — PROGRESS NOTES
Assessment/Plan:    1  Insulin dependent Diabetes mellitus type 2  He has been following with a diabetologist  He states he has been monitoring his diet and has had some weight loss  He stopped Victoza for some time because he could not afford it but since a recent change in insurance, he has restarted his Victoza  He states his blood sugars are doing well  He reports he felt dizziness when he was taking Gabapentin and expressed the wish to not take it anymore  He was advised to minimize processed foods and increase natural foods in his diet  He was encouraged to minimize his white starches, carbohydrates, and concentrated sugars from his diet  He will have an HgbA1C and fasting blood sugar drawn prior to his next appointment  2  Dry Nostril  He states with the heat in his house during winter, he feels one nostril is dry  He was advised to use Flonase to keep his nostrils moisturized  3  Actinia Keratosis  He complained of tenderness on his ear when he shaves his ear  Physical exam showed actinia keratosis which might be irritated when shaving  He was instructed to use Hydrocortisone cream topically on this  He was also given a referral to a dermatologist for further evaluation and treatment for this  4  Healthcare Maintenance  He will have a Hepatitis C screening as he falls in the right age bracket  He declined to have the Prevnar 13 vaccination today at the office  He will follow up with us in 3 months or as needed  Diagnoses and all orders for this visit:    Keratosis, actinic  -     hydrocortisone 2 5 % cream; Apply topically 2 (two) times a day  -     Ambulatory referral to Dermatology; Future    Insulin dependent diabetes mellitus ([facility] )  -     Comprehensive metabolic panel; Future  -     HEMOGLOBIN A1C W/ EAG ESTIMATION; Future    Other orders  -     MELATONIN PO; Take by mouth as needed  -     Naproxen Sodium (ALEVE PO);  Take by mouth as needed          Subjective:      Patient ID: Tessa Ortiz Kristy Blakely is a 80 y o  male  Valentin Peters is a 80year old male who presents today for a 77year old male who presents today for a follow up regarding his diabetes mellitus  He has no acute complaints today and is doing well overall  He has been following with a diabetologist  He states he has been monitoring his diet and has had some weight loss  He stopped Victoza for some time because he could not afford it but since a recent change in insurance, he has restarted his Victoza  He states his blood sugars are doing well  He reports he felt dizziness when he was taking Gabapentin and expressed the wish to not take it anymore  He follows with his eye doctor and will be following with him in April  He denies any significant ocular problems  He complained of tenderness on his ear when he shaves his ear  He denies any fatigue, fever, congestion, sinus problems, respiratory problems, CV problems, GI problems, urinary problems, muscular problems, and skin problems  He states with the heat in his house during winter, he feels one nostril is dry  The following portions of the patient's history were reviewed and updated as appropriate: allergies, current medications, past family history, past medical history, past social history, past surgical history and problem list     Review of Systems   Constitutional: Negative for activity change, appetite change, chills, diaphoresis, fatigue and fever  Unexpected weight change: wt  going down with victoza  Doing better with diabetes and victoza   HENT: Negative for congestion, ear discharge, ear pain, hearing loss, postnasal drip, rhinorrhea, sinus pain, sinus pressure, sneezing and tinnitus  Eyes: Negative for photophobia, pain, discharge, redness, itching and visual disturbance  Has eval  Each april   Respiratory: Negative for cough, choking, chest tightness, shortness of breath, wheezing and stridor      Cardiovascular: Negative for chest pain, palpitations and leg swelling  Gastrointestinal: Negative for abdominal distention, abdominal pain, anal bleeding, blood in stool, constipation, diarrhea, nausea and rectal pain  Genitourinary: Negative for difficulty urinating, dysuria, frequency, testicular pain and urgency  Musculoskeletal: Negative  Skin: Negative  Neurological: Negative for dizziness, tremors, seizures, syncope, speech difficulty, weakness, light-headedness, numbness and headaches  Had dizziness with gabapentin, has tingling of feet   Psychiatric/Behavioral: Negative  Objective:      /72 (BP Location: Left arm, Patient Position: Sitting, Cuff Size: Large)   Pulse 86   Temp 98 2 °F (36 8 °C) (Tympanic)   Ht 6' 0 87" (1 851 m)   Wt 118 kg (259 lb 3 2 oz)   SpO2 98%   BMI 34 32 kg/m²          Physical Exam   Constitutional: He is oriented to person, place, and time  He appears well-developed and well-nourished  No distress  HENT:   Head: Normocephalic and atraumatic  Right Ear: External ear normal    Left Ear: External ear normal    Mouth/Throat: No oropharyngeal exudate  Eyes: Conjunctivae and EOM are normal  Left eye exhibits no discharge  Neck: Normal range of motion  Neck supple  Cardiovascular: Normal rate and regular rhythm  Exam reveals no friction rub  No murmur heard  Pulmonary/Chest: Effort normal and breath sounds normal  No respiratory distress  He has no wheezes  He has no rales  Abdominal: Soft  Bowel sounds are normal  He exhibits no distension  There is no tenderness  There is no rebound and no guarding  Musculoskeletal: He exhibits no edema, tenderness or deformity  Neurological: He is alert and oriented to person, place, and time  He displays abnormal reflex (reduced)  Skin: He is not diaphoretic     Crested area over left ear pannus         Scribe Signature and Attestation:  By signing my name below, Fran Koehler attest that this documentation has been prepared under the direction and in the presence of Dr Teetee Goetz MD  Electronically signed: Ananda Roman  11/28/18    Provider Attestation:  I, Dr Teetee Goetz, personally performed the services described in this documentation  All medical record entries made by the chemaibshamir were at my direction and in my presence  I have reviewed the chart and discharge instructions (if applicable) and agree that the record reflects my personal performance and is accurate and complete  Dr Teetee Goetz MD  11/28/18

## 2018-12-11 LAB
ALBUMIN SERPL-MCNC: 4.6 G/DL (ref 3.6–5.1)
ALBUMIN/GLOB SERPL: 1.6 (CALC) (ref 1–2.5)
ALP SERPL-CCNC: 60 U/L (ref 40–115)
ALT SERPL-CCNC: 39 U/L (ref 9–46)
AST SERPL-CCNC: 19 U/L (ref 10–35)
BILIRUB SERPL-MCNC: 2 MG/DL (ref 0.2–1.2)
BUN SERPL-MCNC: 21 MG/DL (ref 7–25)
BUN/CREAT SERPL: ABNORMAL (CALC) (ref 6–22)
CALCIUM SERPL-MCNC: 9.2 MG/DL (ref 8.6–10.3)
CHLORIDE SERPL-SCNC: 102 MMOL/L (ref 98–110)
CO2 SERPL-SCNC: 28 MMOL/L (ref 20–32)
CREAT SERPL-MCNC: 0.84 MG/DL (ref 0.7–1.25)
EST. AVERAGE GLUCOSE BLD GHB EST-MCNC: 148 (CALC)
EST. AVERAGE GLUCOSE BLD GHB EST-SCNC: 8.2 (CALC)
GLOBULIN SER CALC-MCNC: 2.8 G/DL (CALC) (ref 1.9–3.7)
GLUCOSE SERPL-MCNC: 150 MG/DL (ref 65–99)
HBA1C MFR BLD: 6.8 % OF TOTAL HGB
POTASSIUM SERPL-SCNC: 4.1 MMOL/L (ref 3.5–5.3)
PROT SERPL-MCNC: 7.4 G/DL (ref 6.1–8.1)
SL AMB EGFR AFRICAN AMERICAN: 105 ML/MIN/1.73M2
SL AMB EGFR NON AFRICAN AMERICAN: 91 ML/MIN/1.73M2
SODIUM SERPL-SCNC: 138 MMOL/L (ref 135–146)

## 2018-12-13 ENCOUNTER — OFFICE VISIT (OUTPATIENT)
Dept: ENDOCRINOLOGY | Facility: CLINIC | Age: 68
End: 2018-12-13
Payer: COMMERCIAL

## 2018-12-13 VITALS
BODY MASS INDEX: 34.92 KG/M2 | SYSTOLIC BLOOD PRESSURE: 142 MMHG | DIASTOLIC BLOOD PRESSURE: 70 MMHG | HEART RATE: 92 BPM | WEIGHT: 257.8 LBS | HEIGHT: 72 IN

## 2018-12-13 DIAGNOSIS — I10 BENIGN ESSENTIAL HYPERTENSION: ICD-10-CM

## 2018-12-13 DIAGNOSIS — G47.33 OBSTRUCTIVE SLEEP APNEA SYNDROME: ICD-10-CM

## 2018-12-13 DIAGNOSIS — E11.40 TYPE 2 DIABETES MELLITUS WITH DIABETIC NEUROPATHY, WITHOUT LONG-TERM CURRENT USE OF INSULIN (HCC): Primary | ICD-10-CM

## 2018-12-13 DIAGNOSIS — E11.8 TYPE 2 DIABETES MELLITUS WITH COMPLICATION, UNSPECIFIED WHETHER LONG TERM INSULIN USE: ICD-10-CM

## 2018-12-13 DIAGNOSIS — E78.2 COMBINED HYPERLIPIDEMIA: ICD-10-CM

## 2018-12-13 PROCEDURE — 99214 OFFICE O/P EST MOD 30 MIN: CPT | Performed by: INTERNAL MEDICINE

## 2018-12-13 RX ORDER — MECOBAL/LEVOMEFOLAT CA/B6 PHOS 2-3-35 MG
TABLET ORAL
Qty: 60 TABLET | Refills: 3 | Status: SHIPPED | OUTPATIENT
Start: 2018-12-13 | End: 2019-07-30 | Stop reason: ALTCHOICE

## 2018-12-13 RX ORDER — GLIMEPIRIDE 4 MG/1
TABLET ORAL
Qty: 180 TABLET | Refills: 0
Start: 2018-12-13 | End: 2019-05-25

## 2018-12-13 NOTE — PROGRESS NOTES
Follow up  Patient Progress Note  Cc- type 2 diabetes    Referring Provider  Md Sergey Hand 89  [address]     History of Present Illness:   Samira Bentley is a 80 y o  male with a history of type 2  Diabetes  since 6-8 years   Diabetes course has been stable  Reports complications of diabetes such as neuropathy Denies recent illness or hospitalizations  Denies recent severe hypoglycemic or severe hyperglycemic episodes  Denies any issues with his current regimen  home glucose monitoring: are performed regularly  4 times daily     Home blood glucose readings:   Before breakfast: 130-150 mg/dl   Before lunch: 130-133 mg/dk   Before dinner: 70- 113 mg/dl  Bedtime: 110-120 mg/dl    Current regimen:  Metformin 1000 mg twice a day, Amaryl 4 mg twice a day, Victoza 1 8 mg once daily  compliant most of the timedenies any side effects from medications  He Is having sometimes low blood sugar before dinner  Injects in:  Abdomen Rotates sites: Yes  Hypoglycemic episodes: Yes, intermittent  H/o of hypoglycemia causing hospitalization or Intervention such as glucagon injection  or ambulance call :  No  Hypoglycemia symptoms: jitteriness  Treatment of hypoglycemia:   Juice or eating sweet   Medic alert tag: recommended: Yes    Diet: 3 meals per day, 1 snacks per day  Timing of meals is predictable  Yes  diabetic diet compliance:   He admits noncompliance to diet specially in the evening,   Activity: Daily activity is predictable Yes         further diabetic ROS: no polyuria or polydipsia, no chest pain, dyspnea or TIAs, no unusual visual symptoms, no hypoglycemia, has dysesthesias in the feet      acute symptoms are tingling and numbness in feet     Opthamology: sees regularly,  no retinopathy, no macular edema      Has hypertension and currently taking Lotrel 10/20 mg daily    BP Readings from Last 3 Encounters:   12/13/18 142/70   11/28/18 146/72   09/05/18 128/72       Has hyperlipidemia: followed by PCP; on statin, Lipitor 40 mg po daily  - tolerating well, no myalgias  compliant most of the time  denies any side effects from medications    History of pancreatitis: No     Wt Readings from Last 3 Encounters:   12/13/18 117 kg (257 lb 12 8 oz)   11/28/18 118 kg (259 lb 3 2 oz)   09/05/18 118 kg (261 lb 3 2 oz)     Lab Results   Component Value Date    CREATININE 0 89 11/17/2017       Lab Results   Component Value Date    HGBA1C 6 8 (H) 12/10/2018       Patient Active Problem List   Diagnosis    Allergic rhinitis    Anxiety    Basal cell carcinoma of face    Benign essential hypertension    Coronary artery disease with history of myocardial infarction without history of CABG    Combined hyperlipidemia    Diabetes mellitus ([facility] [address] [facility]    Welcome to Medicare preventive visit      Past Medical History:   Diagnosis Date    Cellulitis     last assessed 7/3/13    Diabetes ([facility] [address] [facility]     last assessed 4/10/13    Gastroenteritis     last assessed 3/30/17    High cholesterol     Hypertension     Rhus dermatitis     last assessed 8/26/15    Skin lesion of face     last assessed 3/30/17      Past Surgical History:   Procedure Laterality Date    ROTATOR CUFF REPAIR  2005      Family History   Problem Relation Age of Onset    No Known Problems Family     Diabetes unspecified Mother     No Known Problems Father      Social History   Substance Use Topics    Smoking status: Former Smoker     Types: Cigarettes    Smokeless tobacco: Never Used      Comment: Quit     Alcohol use Yes      Comment: rare- 1 if out to eat- rarely      Allergies   Allergen Reactions    Januvia [Sitagliptin]      Unknown, patient can't recall         Current Outpatient Prescriptions:     amLODIPine-benazepril (LOTREL) 10-20 MG per capsule, Take 1 capsule by mouth daily, Disp: 90 capsule, Rfl: 1    atorvastatin (LIPITOR) 40 mg tablet, TAKE 1 TABLET BY MOUTH EVERY DAY, Disp: 90 tablet, Rfl: 0    glimepiride (AMARYL) 4 mg tablet, Take 1/2 tablet with breakfast and 1 tablet at dinner, Disp: 180 tablet, Rfl: 0    hydrocortisone 2 5 % cream, Apply topically 2 (two) times a day, Disp: 20 g, Rfl: 0    Insulin Pen Needle (BD PEN NEEDLE CONSUELO U/F) 32G X 4 MM MISC, by Does not apply route daily, Disp: 100 each, Rfl: 1    liraglutide (VICTOZA) injection, Inject 0 3 mL (1 8 mg total) under the skin daily for 90 days, Disp: 27 mL, Rfl: 0    MELATONIN PO, Take by mouth as needed, Disp: , Rfl:     metFORMIN (GLUCOPHAGE) 1000 MG tablet, Take 1 tablet (1,000 mg total) by mouth 2 (two) times a day, Disp: 180 tablet, Rfl: 1    Naproxen Sodium (ALEVE PO), Take by mouth as needed, Disp: , Rfl:     ONETOUCH DELICA LANCETS FINE MISC, Inject under the skin 3 (three) times a day, Disp: 100 each, Rfl: 5    ONETOUCH VERIO test strip, 1 each by Other route 3 (three) times a day Use as instructed, Disp: 100 each, Rfl: 5    gabapentin (NEURONTIN) 100 mg capsule, TAKE 1 CAPSULE (100 MG TOTAL) BY MOUTH DAILY AT BEDTIME (Patient not taking: Reported on 12/13/2018 ), Disp: 90 capsule, Rfl: 0    L-Methylfolate-B6-B12 3-35-2 MG TABS, Take 1 tablet twice a day, Disp: 60 tablet, Rfl: 3  Review of Systems   Constitutional: Negative for activity change, diaphoresis, fatigue, fever and unexpected weight change  HENT: Negative  Eyes: Negative for visual disturbance  Respiratory: Negative for cough, chest tightness and shortness of breath  Cardiovascular: Negative for chest pain, palpitations and leg swelling  Gastrointestinal: Negative for abdominal pain, blood in stool, constipation, diarrhea, nausea and vomiting  Endocrine: Negative for cold intolerance, heat intolerance, polydipsia, polyphagia and polyuria  Genitourinary: Negative for dysuria, enuresis, frequency and urgency  Musculoskeletal: Negative for arthralgias and myalgias  Skin: Negative for pallor, rash and wound  Allergic/Immunologic: Negative      Neurological: Positive for numbness  Negative for dizziness, tremors and weakness  Hematological: Negative  Psychiatric/Behavioral: Negative  Physical Exam:  Body mass index is 34 96 kg/m²  /70   Pulse 92   Ht 6' (1 829 m)   Wt 117 kg (257 lb 12 8 oz)   BMI 34 96 kg/m²    Wt Readings from Last 3 Encounters:   12/13/18 117 kg (257 lb 12 8 oz)   11/28/18 118 kg (259 lb 3 2 oz)   09/05/18 118 kg (261 lb 3 2 oz)       Physical Exam   Constitutional: He is oriented to person, place, and time  He appears well-developed and well-nourished  No distress  HENT:   Head: Normocephalic and atraumatic  Eyes: Pupils are equal, round, and reactive to light  Conjunctivae and EOM are normal  Right eye exhibits no discharge  Left eye exhibits no discharge  Neck: Normal range of motion  Neck supple  No tracheal deviation present  No thyromegaly present  Cardiovascular: Normal rate, regular rhythm, normal heart sounds and intact distal pulses  Exam reveals no friction rub  No murmur heard  Pulmonary/Chest: Effort normal and breath sounds normal  No respiratory distress  He has no wheezes  He has no rales  He exhibits no tenderness  Abdominal: Soft  Bowel sounds are normal  He exhibits no distension  Musculoskeletal: Normal range of motion  He exhibits no edema, tenderness or deformity  Lymphadenopathy:     He has no cervical adenopathy  Neurological: He is alert and oriented to person, place, and time  He has normal reflexes  Skin: Skin is warm and dry  No rash noted  He is not diaphoretic  No erythema  No pallor  Psychiatric: He has a normal mood and affect  Vitals reviewed      Diabetic Foot Exam    Labs:   No components found for: HA1C  No components found for: GLU    Lab Results   Component Value Date    CREATININE 0 89 11/17/2017    CREATININE 0 79 03/28/2017    CREATININE 0 79 07/14/2016    BUN 21 12/10/2018     11/17/2017    K 4 1 12/10/2018     12/10/2018    CO2 28 12/10/2018     No results found for: EGFR  No components found for: [facility]    Lab Results   Component Value Date    CHOL 182 11/17/2017    HDL 43 08/03/2018    TRIG 119 08/03/2018       Lab Results   Component Value Date    ALT 29 11/17/2017    AST 16 11/17/2017    ALKPHOS 60 12/10/2018    BILITOT 1 5 (H) 11/17/2017       No results found for: TSH, FREET4, TSI    Impression:  1  Type 2 diabetes mellitus with diabetic neuropathy, without long-term current use of insulin ([facility] [address] )    2  Benign essential hypertension    3  Combined hyperlipidemia    4  Obstructive sleep apnea syndrome    5  Type 2 diabetes mellitus with complication, unspecified whether long term insulin use ([facility] )           Plan:    Diagnoses and all orders for this visit:    Type 2 diabetes mellitus with diabetic neuropathy, without long-term current use of insulin ([facility])  A1c 6 8%, at goal, however patient is having low blood sugars before dinner  I have cut down Amaryl to 2 mg with breakfast and continue 4 mg with dinner  Discussed to keep carbohydrates 45 g for breakfast and lunch, and 60 g for dinner  Also discussed to stay away from carbohydrate reach snacks  Continue to monitor blood sugars 2-3 times daily, and send log sugars persistently above 200  Will start Metanx 1 tablet twice a day for neuropathy, pt did not find any difference with Gabapentin and stopped taking it   Follow up in 3 mths   Discussed hypoglycemia symptoms and t/t and get medical alert bracelet   -     L-Methylfolate-B6-B12 3-35-2 MG TABS; Take 1 tablet twice a day  -     Hemoglobin A1C; Future  -     Basic metabolic panel; Future    Benign essential hypertension  BP Readings from Last 3 Encounters:   12/13/18 142/70   11/28/18 146/72   09/05/18 128/72   BP almost at goal  Continue current management     Combined hyperlipidemia  No results found for: LDLCALC  Continue statins   Low saturated fat diet  -     Lipid panel Lab Collect Lab Collect;  Future    Obstructive sleep apnea syndrome     Has not seen sleep specialist , he was referred by Dr Cayetano Nava     Type 2 diabetes mellitus with complication, unspecified whether long term insulin use (HCC)  -     glimepiride (AMARYL) 4 mg tablet; Take 1/2 tablet with breakfast and 1 tablet at dinner            Discussed with the patient and all questioned fully answered  He will call me if any problems arise      Counseled patient on diagnostic results, prognosis, risk and benefit of treatment options, instruction for management, importance of treatment compliance, Risk  factor reduction and impressions      Christine Gomez MD

## 2018-12-13 NOTE — PATIENT INSTRUCTIONS
Hypoglycemia instructions   Stacey Holden  12/13/2018  [id number]    Low Blood Sugar    Steps to treat low blood sugar  1  Test blood sugar if you have symptoms of low blood sugar:   Low Blood Sugar Symptoms:  o Sweaty  o Dizzy  o Rapid heartbeat  o Shaky    o Bad mood  o Hungry      2  Treat blood sugar less than 70 with 15 grams of fast-acting carbohydrate:   Examples of 15 grams Fast-Acting Carbohydrate:  o 4 oz juice  o 4 oz regular soda  o 3-4 glucose tablets (chew)  o 3-4 hard candies (chew)              3    Wait 15 minutes and test your blood sugar again           4   If blood sugar is less than 100, repeat steps 2-3       5  When your blood sugar is 100 or more, eat a snack if it will be longer than one hour until your next meal  The snack should be 15 grams of carbohydrate and a protein:   Examples of snacks:  o ½ sandwich  o 6 crackers with cheese  o Piece of fruit with cheese or peanut butter  o 6 crackers with peanut butter

## 2018-12-13 NOTE — LETTER
December 13, 2018     MD Sergey Boone 92  [address] [address]    Patient: Fide Chu   YOB: 1950   Date of Visit: 12/13/2018       Dear Dr Monty Layton: Thank you for referring Marsha Kaufman to me for evaluation  Below are my notes for this consultation  If you have questions, please do not hesitate to call me  I look forward to following your patient along with you  Sincerely,        Lizandro Franklin MD        CC: No Recipients  Lizandro Franklin MD  12/13/2018 10:50 AM  Sign at close encounter      New Patient Progress Note      No chief complaint on file  Referring Provider  Md Sergey Boone [address]  [facility]     History of Present Illness:   Fide Chu is a 80 y o  male with a history of type 2  Diabetes  since 6-8 years   Diabetes course has been stable  Reports complications of diabetes such as neuropathy Denies recent illness or hospitalizations  Denies recent severe hypoglycemic or severe hyperglycemic episodes  Denies any issues with his current regimen  home glucose monitoring: are performed regularly  4 times daily     Home blood glucose readings:   Before breakfast: 130-150 mg/dl   Before lunch: 130-133 mg/dk   Before dinner: 70- 113 mg/dl  Bedtime: 110-120 mg/dl    Current regimen:  Metformin 1000 mg twice a day, Amaryl 4 mg twice a day, Victoza 1 8 mg once daily  compliant most of the timedenies any side effects from medications  He Is having sometimes low blood sugar before dinner  Injects in:  Abdomen Rotates sites: Yes  Hypoglycemic episodes: Yes, intermittent  H/o of hypoglycemia causing hospitalization or Intervention such as glucagon injection  or ambulance call :  No  Hypoglycemia symptoms: jitteriness  Treatment of hypoglycemia:   Juice or eating sweet   Medic alert tag: recommended: Yes    Diet: 3 meals per day, 1 snacks per day  Timing of meals is predictable   Yes  diabetic diet compliance:   He admits noncompliance to diet specially in the evening,   Activity: Daily activity is predictable Yes         further diabetic ROS: no polyuria or polydipsia, no chest pain, dyspnea or TIAs, no unusual visual symptoms, no hypoglycemia, has dysesthesias in the feet      acute symptoms are tingling and numbness in feet     Opthamology: sees regularly,  no retinopathy, no macular edema      Has hypertension and currently taking Lotrel 10/20 mg daily  BP Readings from Last 3 Encounters:   12/13/18 142/70   11/28/18 146/72   09/05/18 128/72       Has hyperlipidemia: followed by PCP; on statin, Lipitor 40 mg po daily  - tolerating well, no myalgias  compliant most of the time  denies any side effects from medications    History of pancreatitis: No     Wt Readings from Last 3 Encounters:   12/13/18 117 kg (257 lb 12 8 oz)   11/28/18 118 kg (259 lb 3 2 oz)   09/05/18 118 kg (261 lb 3 2 oz)     Lab Results   Component Value Date    CREATININE 0 89 11/17/2017       Lab Results   Component Value Date    HGBA1C 6 8 (H) 12/10/2018       Patient Active Problem List   Diagnosis    Allergic rhinitis    Anxiety    Basal cell carcinoma of face    Benign essential hypertension    Coronary artery disease with history of myocardial infarction without history of CABG    Combined hyperlipidemia    Diabetes mellitus ([address] )    Welcome to Medicare preventive visit      Past Medical History:   Diagnosis Date    Cellulitis     last assessed 7/3/13    Diabetes ([address] )     last assessed 4/10/13    Gastroenteritis     last assessed 3/30/17    High cholesterol     Hypertension     Rhus dermatitis     last assessed 8/26/15    Skin lesion of face     last assessed 3/30/17      Past Surgical History:   Procedure Laterality Date    ROTATOR CUFF REPAIR  2005      Family History   Problem Relation Age of Onset    No Known Problems Family     Diabetes unspecified Mother     No Known Problems Father      Social History Substance Use Topics    Smoking status: Former Smoker     Types: Cigarettes    Smokeless tobacco: Never Used      Comment: Quit     Alcohol use Yes      Comment: rare- 1 if out to eat- rarely      Allergies   Allergen Reactions    Januvia [Sitagliptin]      Unknown, patient can't recall         Current Outpatient Prescriptions:     amLODIPine-benazepril (LOTREL) 10-20 MG per capsule, Take 1 capsule by mouth daily, Disp: 90 capsule, Rfl: 1    atorvastatin (LIPITOR) 40 mg tablet, TAKE 1 TABLET BY MOUTH EVERY DAY, Disp: 90 tablet, Rfl: 0    glimepiride (AMARYL) 4 mg tablet, Take 1/2 tablet with breakfast and 1 tablet at dinner, Disp: 180 tablet, Rfl: 0    hydrocortisone 2 5 % cream, Apply topically 2 (two) times a day, Disp: 20 g, Rfl: 0    Insulin Pen Needle (BD PEN NEEDLE CONSUELO U/F) 32G X 4 MM MISC, by Does not apply route daily, Disp: 100 each, Rfl: 1    liraglutide (VICTOZA) injection, Inject 0 3 mL (1 8 mg total) under the skin daily for 90 days, Disp: 27 mL, Rfl: 0    MELATONIN PO, Take by mouth as needed, Disp: , Rfl:     metFORMIN (GLUCOPHAGE) 1000 MG tablet, Take 1 tablet (1,000 mg total) by mouth 2 (two) times a day, Disp: 180 tablet, Rfl: 1    Naproxen Sodium (ALEVE PO), Take by mouth as needed, Disp: , Rfl:     ONETOUCH DELICA LANCETS FINE MISC, Inject under the skin 3 (three) times a day, Disp: 100 each, Rfl: 5    ONETOUCH VERIO test strip, 1 each by Other route 3 (three) times a day Use as instructed, Disp: 100 each, Rfl: 5    gabapentin (NEURONTIN) 100 mg capsule, TAKE 1 CAPSULE (100 MG TOTAL) BY MOUTH DAILY AT BEDTIME (Patient not taking: Reported on 12/13/2018 ), Disp: 90 capsule, Rfl: 0    L-Methylfolate-B6-B12 3-35-2 MG TABS, Take 1 tablet twice a day, Disp: 60 tablet, Rfl: 3  Review of Systems   Constitutional: Negative for activity change, diaphoresis, fatigue, fever and unexpected weight change  HENT: Negative  Eyes: Negative for visual disturbance     Respiratory: Negative for cough, chest tightness and shortness of breath  Cardiovascular: Negative for chest pain, palpitations and leg swelling  Gastrointestinal: Negative for abdominal pain, blood in stool, constipation, diarrhea, nausea and vomiting  Endocrine: Negative for cold intolerance, heat intolerance, polydipsia, polyphagia and polyuria  Genitourinary: Negative for dysuria, enuresis, frequency and urgency  Musculoskeletal: Negative for arthralgias and myalgias  Skin: Negative for pallor, rash and wound  Allergic/Immunologic: Negative  Neurological: Positive for numbness  Negative for dizziness, tremors and weakness  Hematological: Negative  Psychiatric/Behavioral: Negative  Physical Exam:  Body mass index is 34 96 kg/m²  /70   Pulse 92   Ht 6' (1 829 m)   Wt 117 kg (257 lb 12 8 oz)   BMI 34 96 kg/m²     Wt Readings from Last 3 Encounters:   12/13/18 117 kg (257 lb 12 8 oz)   11/28/18 118 kg (259 lb 3 2 oz)   09/05/18 118 kg (261 lb 3 2 oz)       Physical Exam   Constitutional: He is oriented to person, place, and time  He appears well-developed and well-nourished  No distress  HENT:   Head: Normocephalic and atraumatic  Eyes: Pupils are equal, round, and reactive to light  Conjunctivae and EOM are normal  Right eye exhibits no discharge  Left eye exhibits no discharge  Neck: Normal range of motion  Neck supple  No tracheal deviation present  No thyromegaly present  Cardiovascular: Normal rate, regular rhythm, normal heart sounds and intact distal pulses  Exam reveals no friction rub  No murmur heard  Pulmonary/Chest: Effort normal and breath sounds normal  No respiratory distress  He has no wheezes  He has no rales  He exhibits no tenderness  Abdominal: Soft  Bowel sounds are normal  He exhibits no distension  Musculoskeletal: Normal range of motion  He exhibits no edema, tenderness or deformity  Lymphadenopathy:     He has no cervical adenopathy     Neurological: He is alert and oriented to person, place, and time  He has normal reflexes  Skin: Skin is warm and dry  No rash noted  He is not diaphoretic  No erythema  No pallor  Psychiatric: He has a normal mood and affect  Vitals reviewed  Diabetic Foot Exam    Labs:   No components found for: HA1C  No components found for: GLU    Lab Results   Component Value Date    CREATININE 0 89 11/17/2017    CREATININE 0 79 03/28/2017    CREATININE 0 79 07/14/2016    BUN 21 12/10/2018     11/17/2017    K 4 1 12/10/2018     12/10/2018    CO2 28 12/10/2018     No results found for: EGFR  No components found for: [facility]    Lab Results   Component Value Date    CHOL 182 11/17/2017    HDL 43 08/03/2018    TRIG 119 08/03/2018       Lab Results   Component Value Date    ALT 29 11/17/2017    AST 16 11/17/2017    ALKPHOS 60 12/10/2018    BILITOT 1 5 (H) 11/17/2017       No results found for: TSH, FREET4, TSI    Impression:  1  Type 2 diabetes mellitus with diabetic neuropathy, without long-term current use of insulin ([facility] )    2  Benign essential hypertension    3  Combined hyperlipidemia    4  Obstructive sleep apnea syndrome    5  Type 2 diabetes mellitus with complication, unspecified whether long term insulin use ([address] [place] [address] )           Plan:    Diagnoses and all orders for this visit:    Type 2 diabetes mellitus with diabetic neuropathy, without long-term current use of insulin ([facility])  A1c 6 8%, at goal, however patient is having low blood sugars before dinner  I have cut down Amaryl to 2 mg with breakfast and continue 4 mg with dinner  Discussed to keep carbohydrates 45 g for breakfast and lunch, and 60 g for dinner    Also discussed to stay away from carbohydrate reach snacks  Continue to monitor blood sugars 2-3 times daily, and send log sugars persistently above 200  Will start Metanx 1 tablet twice a day for neuropathy, pt did not find any difference with Gabapentin and stopped taking it   Follow up in 3 mths   Discussed hypoglycemia symptoms and t/t and get medical alert bracelet   -     L-Methylfolate-B6-B12 3-35-2 MG TABS; Take 1 tablet twice a day  -     Hemoglobin A1C; Future  -     Basic metabolic panel; Future    Benign essential hypertension  BP Readings from Last 3 Encounters:   12/13/18 142/70   11/28/18 146/72   09/05/18 128/72   BP almost at goal  Continue current management     Combined hyperlipidemia  No results found for: LDLCALC  Continue statins   Low saturated fat diet  -     Lipid panel Lab Collect Lab Collect; Future    Obstructive sleep apnea syndrome     Has not seen sleep specialist , he was referred by Dr Macy Bear     Type 2 diabetes mellitus with complication, unspecified whether long term insulin use (HCC)  -     glimepiride (AMARYL) 4 mg tablet; Take 1/2 tablet with breakfast and 1 tablet at dinner            Discussed with the patient and all questioned fully answered  He will call me if any problems arise      Counseled patient on diagnostic results, prognosis, risk and benefit of treatment options, instruction for management, importance of treatment compliance, Risk  factor reduction and impressions      Belen Verde MD

## 2019-01-02 DIAGNOSIS — E11.65 UNCONTROLLED TYPE 2 DIABETES MELLITUS WITH HYPERGLYCEMIA (HCC): Primary | ICD-10-CM

## 2019-01-03 DIAGNOSIS — E11.65 UNCONTROLLED TYPE 2 DIABETES MELLITUS WITH HYPERGLYCEMIA (HCC): ICD-10-CM

## 2019-02-23 DIAGNOSIS — E78.2 MIXED HYPERLIPIDEMIA: ICD-10-CM

## 2019-02-26 RX ORDER — ATORVASTATIN CALCIUM 40 MG/1
TABLET, FILM COATED ORAL
Qty: 90 TABLET | Refills: 0 | Status: SHIPPED | OUTPATIENT
Start: 2019-02-26 | End: 2019-05-25 | Stop reason: SDUPTHER

## 2019-03-05 ENCOUNTER — OFFICE VISIT (OUTPATIENT)
Dept: INTERNAL MEDICINE CLINIC | Age: 69
End: 2019-03-05
Payer: COMMERCIAL

## 2019-03-05 VITALS
WEIGHT: 258.4 LBS | HEART RATE: 96 BPM | SYSTOLIC BLOOD PRESSURE: 148 MMHG | OXYGEN SATURATION: 98 % | BODY MASS INDEX: 35.05 KG/M2 | TEMPERATURE: 98.3 F | DIASTOLIC BLOOD PRESSURE: 80 MMHG

## 2019-03-05 DIAGNOSIS — E11.8 TYPE 2 DIABETES MELLITUS WITH COMPLICATION, UNSPECIFIED WHETHER LONG TERM INSULIN USE: ICD-10-CM

## 2019-03-05 DIAGNOSIS — Z11.59 NEED FOR HEPATITIS C SCREENING TEST: Primary | ICD-10-CM

## 2019-03-05 DIAGNOSIS — E66.9 OBESITY, CLASS II, BMI 35-39.9: ICD-10-CM

## 2019-03-05 DIAGNOSIS — I10 BENIGN ESSENTIAL HYPERTENSION: ICD-10-CM

## 2019-03-05 DIAGNOSIS — R17 ELEVATED BILIRUBIN: ICD-10-CM

## 2019-03-05 PROCEDURE — 99214 OFFICE O/P EST MOD 30 MIN: CPT | Performed by: INTERNAL MEDICINE

## 2019-03-05 PROCEDURE — 1160F RVW MEDS BY RX/DR IN RCRD: CPT | Performed by: INTERNAL MEDICINE

## 2019-03-05 PROCEDURE — 1036F TOBACCO NON-USER: CPT | Performed by: INTERNAL MEDICINE

## 2019-03-05 NOTE — PATIENT INSTRUCTIONS
1  He was advised to use Ayr gel on his dry and irritated nares  2  He will have a total and direct bilirubin drawn at his earliest convenience  3  He was instructed to call Dr Maycol Torres in one week if he does not hear the results  4  He was advised to decrease caffeine and salt in his diet  5  He will have blood work completed prior to next visit  6  He will have a Hepatitis C screening completed at his earliest convenience  7  He will follow up with us in 3 months or as needed

## 2019-03-19 LAB
BUN SERPL-MCNC: 16 MG/DL (ref 7–25)
BUN/CREAT SERPL: ABNORMAL (CALC) (ref 6–22)
CALCIUM SERPL-MCNC: 9.3 MG/DL (ref 8.6–10.3)
CHLORIDE SERPL-SCNC: 105 MMOL/L (ref 98–110)
CHOLEST SERPL-MCNC: 97 MG/DL
CHOLEST/HDLC SERPL: 2.5 (CALC)
CO2 SERPL-SCNC: 24 MMOL/L (ref 20–32)
CREAT SERPL-MCNC: 0.91 MG/DL (ref 0.7–1.25)
GLUCOSE SERPL-MCNC: 185 MG/DL (ref 65–99)
HBA1C MFR BLD: 6.9 % OF TOTAL HGB
HDLC SERPL-MCNC: 39 MG/DL
LDLC SERPL CALC-MCNC: 41 MG/DL (CALC)
NONHDLC SERPL-MCNC: 58 MG/DL (CALC)
POTASSIUM SERPL-SCNC: 4 MMOL/L (ref 3.5–5.3)
SL AMB EGFR AFRICAN AMERICAN: 101 ML/MIN/1.73M2
SL AMB EGFR NON AFRICAN AMERICAN: 87 ML/MIN/1.73M2
SODIUM SERPL-SCNC: 139 MMOL/L (ref 135–146)
TRIGL SERPL-MCNC: 86 MG/DL

## 2019-03-21 ENCOUNTER — OFFICE VISIT (OUTPATIENT)
Dept: ENDOCRINOLOGY | Facility: CLINIC | Age: 69
End: 2019-03-21
Payer: COMMERCIAL

## 2019-03-21 VITALS
BODY MASS INDEX: 35.89 KG/M2 | SYSTOLIC BLOOD PRESSURE: 150 MMHG | HEIGHT: 72 IN | WEIGHT: 265 LBS | DIASTOLIC BLOOD PRESSURE: 70 MMHG

## 2019-03-21 DIAGNOSIS — E11.65 TYPE 2 DIABETES MELLITUS WITH HYPERGLYCEMIA, WITHOUT LONG-TERM CURRENT USE OF INSULIN (HCC): Primary | ICD-10-CM

## 2019-03-21 DIAGNOSIS — I10 BENIGN ESSENTIAL HYPERTENSION: ICD-10-CM

## 2019-03-21 DIAGNOSIS — E78.2 COMBINED HYPERLIPIDEMIA: ICD-10-CM

## 2019-03-21 PROCEDURE — 99214 OFFICE O/P EST MOD 30 MIN: CPT | Performed by: PHYSICIAN ASSISTANT

## 2019-03-21 NOTE — PROGRESS NOTES
Patient Progress Note      CC: DM2     Referring Provider  Md Sergey Regan 89  [address]     History of Present Illness:   Jessa Ham is a 80 y o  male with a history of type 2 diabetes without long term use of insulin  Diabetes course has been stable  Complications of DM: CAD, neuropathy  Denies recent illness or hospitalizations  Denies recent severe hypoglycemic or severe hyperglycemic episodes  Denies any issues with his current regimen  Home glucose monitoring: are performed regularly    Home blood glucose readings range: 102-171 mg/dl    Current regimen: metformin 1000 mg BID, Victoza 1 8 mg daily, glimepiride 2 mg with breakfast and 4 mg with dinner  compliant most of the time, denies any side effects from medications  Injects in: abdomen  Rotates sites: Yes  Hypoglycemic episodes: No, rare  H/o of hypoglycemia causing hospitalization or Intervention such as glucagon injection  or ambulance call :  No  Hypoglycemia symptoms: dizziness  Treatment of hypoglycemia: candy    Medic alert tag: recommended: Yes    Diet: 3 meals per day, 1-2 snacks per day  Timing of meals is predictable  Diabetic diet compliance:  compliant most of the time  Activity: Daily activity is predictable: Yes  Further diabetic ROS: no polyuria or polydipsia, no chest pain, dyspnea or TIAs        Ophthamology: sees routinely  Podiatry: does not see podiatry, he reports feet were examined by PCP  Has hypertension: on Amlodipine-benazepril, compliant most of the time  Has hyperlipidemia: on atorvastatin- tolerating well, no myalgias  compliant most of the time, denies any side effects from medications    Thyroid disorders: No  History of pancreatitis: No    Patient Active Problem List   Diagnosis    Allergic rhinitis    Anxiety    Basal cell carcinoma of face    Benign essential hypertension    Coronary artery disease with history of myocardial infarction without history of CABG  Combined hyperlipidemia    [facility]    Welcome to Medicare preventive visit      Past Medical History:   Diagnosis Date    Cellulitis     last assessed 7/3/13    Diabetes ([facility] [address] )     last assessed 4/10/13    Gastroenteritis     last assessed 3/30/17    High cholesterol     Hypertension     Rhus dermatitis     last assessed 8/26/15    Skin lesion of face     last assessed 3/30/17      Past Surgical History:   Procedure Laterality Date    ROTATOR CUFF REPAIR  2005      Family History   Problem Relation Age of Onset    No Known Problems Family     Diabetes unspecified Mother     No Known Problems Father      Social History     Tobacco Use    Smoking status: Former Smoker     Types: Cigarettes    Smokeless tobacco: Never Used    Tobacco comment: Quit    Substance Use Topics    Alcohol use: Yes     Comment: rare- 1 if out to eat- rarely      Allergies   Allergen Reactions    Januvia [Sitagliptin]      Unknown, patient can't recall         Current Outpatient Medications:     amLODIPine-benazepril (LOTREL) 10-20 MG per capsule, Take 1 capsule by mouth daily, Disp: 90 capsule, Rfl: 1    atorvastatin (LIPITOR) 40 mg tablet, TAKE 1 TABLET BY MOUTH EVERY DAY, Disp: 90 tablet, Rfl: 0    glimepiride (AMARYL) 4 mg tablet, Take 1/2 tablet with breakfast and 1 tablet at dinner, Disp: 180 tablet, Rfl: 0    Insulin Pen Needle 31G X 6 MM MISC, by Does not apply route daily, Disp: 100 each, Rfl: 1    liraglutide (VICTOZA) injection, Inject 0 3 mL (1 8 mg total) under the skin daily for 90 days, Disp: 27 mL, Rfl: 0    MELATONIN PO, Take by mouth as needed, Disp: , Rfl:     metFORMIN (GLUCOPHAGE) 1000 MG tablet, Take 1 tablet (1,000 mg total) by mouth 2 (two) times a day, Disp: 180 tablet, Rfl: 1    ONETOUCH DELICA LANCETS FINE MISC, Inject under the skin 3 (three) times a day, Disp: 100 each, Rfl: 5    ONETOUCH VERIO test strip, 1 each by Other route 3 (three) times a day Use as instructed, Disp: 100 each, Rfl: 5    gabapentin (NEURONTIN) 100 mg capsule, TAKE 1 CAPSULE (100 MG TOTAL) BY MOUTH DAILY AT BEDTIME (Patient not taking: Reported on 12/13/2018 ), Disp: 90 capsule, Rfl: 0    hydrocortisone 2 5 % cream, Apply topically 2 (two) times a day (Patient not taking: Reported on 3/5/2019), Disp: 20 g, Rfl: 0    L-Methylfolate-B6-B12 3-35-2 MG TABS, Take 1 tablet twice a day (Patient not taking: Reported on 3/5/2019), Disp: 60 tablet, Rfl: 3    Naproxen Sodium (ALEVE PO), Take by mouth as needed, Disp: , Rfl:   Review of Systems   Constitutional: Negative for activity change, appetite change, fatigue and unexpected weight change  HENT: Negative for trouble swallowing  Eyes: Negative for visual disturbance  Respiratory: Negative for shortness of breath  Cardiovascular: Negative for chest pain and palpitations  Gastrointestinal: Negative for constipation and diarrhea  Endocrine: Negative for polydipsia and polyuria  Musculoskeletal: Negative  Skin: Negative for wound  Neurological: Positive for numbness  Psychiatric/Behavioral: Negative  Physical Exam:  Body mass index is 35 94 kg/m²  /70   Ht 6' (1 829 m)   Wt 120 kg (265 lb)   BMI 35 94 kg/m²    Wt Readings from Last 3 Encounters:   03/21/19 120 kg (265 lb)   03/05/19 117 kg (258 lb 6 4 oz)   12/13/18 117 kg (257 lb 12 8 oz)       Physical Exam   Constitutional: He appears well-developed and well-nourished  HENT:   Head: Normocephalic  Eyes: Pupils are equal, round, and reactive to light  EOM are normal  No scleral icterus  Neck: Neck supple  No thyromegaly present  Cardiovascular: Normal rate and regular rhythm  No murmur heard  Pulses:       Radial pulses are 2+ on the right side, and 2+ on the left side  Pulmonary/Chest: Effort normal and breath sounds normal  No respiratory distress  He has no wheezes  Neurological: He is alert  He has normal reflexes  Skin: Skin is warm and dry  Psychiatric: He has a normal mood and affect  Nursing note and vitals reviewed  Diabetic Foot Exam    Labs:   Component      Latest Ref Rng & Units 3/18/2019   Glucose, Random      65 - 99 mg/dL 185 (H)   BUN      7 - 25 mg/dL 16   Creatinine      0 70 - 1 25 mg/dL 0 91   eGFR Non       > OR = 60 mL/min/1 73m2 87   eGFR       > OR = 60 mL/min/1 73m2 101   SL AMB BUN/CREATININE RATIO      6 - 22 (calc) NOT APPLICABLE   Sodium      135 - 146 mmol/L 139   Potassium      3 5 - 5 3 mmol/L 4 0   Chloride      98 - 110 mmol/L 105   CO2      20 - 32 mmol/L 24   SL AMB CALCIUM      8 6 - 10 3 mg/dL 9 3   Cholesterol      <200 mg/dL 97   HDL      >40 mg/dL 39 (L)   Triglycerides      <150 mg/dL 86   LDL Direct      mg/dL (calc) 41   Chol HDLC Ratio      <5 0 (calc) 2 5   Non-HDL Cholesterol      <130 mg/dL (calc) 58   Hemoglobin A1C      <5 7 % of total Hgb 6 9 (H)       Plan:    Diagnoses and all orders for this visit:    Type 2 diabetes mellitus with hyperglycemia, without long-term current use of insulin (HCC)  HGA1C 6 9%  Treatment regimen: continue current treatment  Discussed risks/complications associated with uncontrolled diabetes  Advised to adhere to diabetic diet, and recommended staying active/exercising routinely  Keep carbohydrates consistent to limit blood glucose fluctuations  Advised to call if blood sugars less than 70 mg/dl or over 300 mg/dl  Check blood glucose 2 times a day  Discussed symptoms and treatment of hypoglycemia  Recommended routine follow-up with podiatry and ophthalmology  Ordered blood work to complete prior to next visit  -     Hemoglobin A1C; Future  -     Basic metabolic panel; Future    Benign essential hypertension  Blood pressure elevated today  For now continue current treatment  Advised patient to monitor blood pressure at home and follow-up with PCP if remaining elevated (>140/90)  -     Basic metabolic panel;  Future    Combined hyperlipidemia  LDL 41, at goal  Continue statin therapy     Discussed with the patient diagnosis and treatment and all questions fully answered  He will call me if any problems arise  Counseled patient on diagnostic results, prognosis, risk and benefit of treatment options, instruction for management, importance of treatment compliance, risk factor reduction and impressions        Jackie Flores PA-C

## 2019-03-21 NOTE — PATIENT INSTRUCTIONS
Hypoglycemia instructions   Ford Guerrero  3/21/2019  [id number]    Low Blood Sugar    Steps to treat low blood sugar  1  Test blood sugar if you have symptoms of low blood sugar:   Low Blood Sugar Symptoms:  o Sweaty  o Dizzy  o Rapid heartbeat  o Shaky  o Bad mood  o Hungry      2  Treat blood sugar less than 70 with 15 grams of fast-acting carbohydrate:   Examples of 15 grams Fast-Acting Carbohydrate:  o 4 oz juice  o 4 oz regular soda  o 3-4 glucose tablets (chew)  o 3-4 hard candies (chew)          3  Wait 15 minutes and test your blood sugar again     4   If blood sugar is less than 100, repeat steps 2-3     5  When your blood sugar is 100 or more, eat a snack if it will be longer than one hour until your next meal  The snack should be 15 grams of carbohydrate and a protein:   Examples of snacks:  o ½ sandwich  o 6 crackers with cheese  o Piece of fruit with cheese or peanut butter  o 6 crackers with peanut butter

## 2019-03-29 DIAGNOSIS — E11.8 TYPE 2 DIABETES MELLITUS WITH COMPLICATION, UNSPECIFIED WHETHER LONG TERM INSULIN USE: ICD-10-CM

## 2019-03-29 DIAGNOSIS — E11.8 TYPE 2 DIABETES MELLITUS WITH COMPLICATION, WITH LONG-TERM CURRENT USE OF INSULIN (HCC): ICD-10-CM

## 2019-03-29 DIAGNOSIS — Z79.4 TYPE 2 DIABETES MELLITUS WITH COMPLICATION, WITH LONG-TERM CURRENT USE OF INSULIN (HCC): ICD-10-CM

## 2019-04-02 RX ORDER — BLOOD SUGAR DIAGNOSTIC
1 STRIP MISCELLANEOUS 3 TIMES DAILY
Qty: 100 EACH | Refills: 5 | Status: SHIPPED | OUTPATIENT
Start: 2019-04-02 | End: 2019-08-05

## 2019-04-12 LAB
LEFT EYE DIABETIC RETINOPATHY: NORMAL
RIGHT EYE DIABETIC RETINOPATHY: NORMAL

## 2019-05-24 DIAGNOSIS — I10 ESSENTIAL HYPERTENSION: ICD-10-CM

## 2019-05-24 RX ORDER — AMLODIPINE BESYLATE AND BENAZEPRIL HYDROCHLORIDE 10; 20 MG/1; MG/1
CAPSULE ORAL
Qty: 30 CAPSULE | Refills: 2 | Status: SHIPPED | OUTPATIENT
Start: 2019-05-24 | End: 2019-07-30 | Stop reason: SDUPTHER

## 2019-05-25 DIAGNOSIS — E11.8 TYPE 2 DIABETES MELLITUS WITH COMPLICATION, UNSPECIFIED WHETHER LONG TERM INSULIN USE: ICD-10-CM

## 2019-05-25 DIAGNOSIS — E78.2 MIXED HYPERLIPIDEMIA: ICD-10-CM

## 2019-05-25 RX ORDER — GLIMEPIRIDE 4 MG/1
TABLET ORAL
Qty: 180 TABLET | Refills: 1 | Status: SHIPPED | OUTPATIENT
Start: 2019-05-25 | End: 2019-11-14 | Stop reason: SDUPTHER

## 2019-05-27 RX ORDER — ATORVASTATIN CALCIUM 40 MG/1
TABLET, FILM COATED ORAL
Qty: 90 TABLET | Refills: 0 | Status: SHIPPED | OUTPATIENT
Start: 2019-05-27 | End: 2019-08-20 | Stop reason: SDUPTHER

## 2019-05-30 DIAGNOSIS — I10 ESSENTIAL HYPERTENSION: ICD-10-CM

## 2019-05-30 RX ORDER — AMLODIPINE BESYLATE AND BENAZEPRIL HYDROCHLORIDE 10; 20 MG/1; MG/1
1 CAPSULE ORAL DAILY
Qty: 90 CAPSULE | Refills: 1 | Status: SHIPPED | OUTPATIENT
Start: 2019-05-30 | End: 2019-12-02 | Stop reason: SDUPTHER

## 2019-06-30 DIAGNOSIS — E11.8 TYPE 2 DIABETES MELLITUS WITH COMPLICATION, WITH LONG-TERM CURRENT USE OF INSULIN (HCC): ICD-10-CM

## 2019-06-30 DIAGNOSIS — Z79.4 TYPE 2 DIABETES MELLITUS WITH COMPLICATION, WITH LONG-TERM CURRENT USE OF INSULIN (HCC): ICD-10-CM

## 2019-06-30 RX ORDER — LIRAGLUTIDE 6 MG/ML
INJECTION SUBCUTANEOUS
Qty: 3 PEN | Refills: 3 | Status: SHIPPED | OUTPATIENT
Start: 2019-06-30 | End: 2019-08-20 | Stop reason: SDUPTHER

## 2019-07-30 LAB
ALBUMIN SERPL-MCNC: 4.6 G/DL (ref 3.6–5.1)
ALBUMIN SERPL-MCNC: 4.6 G/DL (ref 3.6–5.1)
ALBUMIN/GLOB SERPL: 1.7 (CALC) (ref 1–2.5)
ALBUMIN/GLOB SERPL: 1.7 (CALC) (ref 1–2.5)
ALP SERPL-CCNC: 61 U/L (ref 40–115)
ALP SERPL-CCNC: 61 U/L (ref 40–115)
ALT SERPL-CCNC: 29 U/L (ref 9–46)
ALT SERPL-CCNC: 29 U/L (ref 9–46)
AST SERPL-CCNC: 15 U/L (ref 10–35)
AST SERPL-CCNC: 15 U/L (ref 10–35)
BILIRUB DIRECT SERPL-MCNC: 0.4 MG/DL
BILIRUB INDIRECT SERPL-MCNC: 1.5 MG/DL (CALC) (ref 0.2–1.2)
BILIRUB SERPL-MCNC: 1.9 MG/DL (ref 0.2–1.2)
BILIRUB SERPL-MCNC: 1.9 MG/DL (ref 0.2–1.2)
BUN SERPL-MCNC: 20 MG/DL (ref 7–25)
BUN/CREAT SERPL: ABNORMAL (CALC) (ref 6–22)
CALCIUM SERPL-MCNC: 9.3 MG/DL (ref 8.6–10.3)
CHLORIDE SERPL-SCNC: 103 MMOL/L (ref 98–110)
CHOLEST SERPL-MCNC: 99 MG/DL
CHOLEST/HDLC SERPL: 2.5 (CALC)
CO2 SERPL-SCNC: 27 MMOL/L (ref 20–32)
CREAT SERPL-MCNC: 0.91 MG/DL (ref 0.7–1.25)
EST. AVERAGE GLUCOSE BLD GHB EST-MCNC: 157 (CALC)
EST. AVERAGE GLUCOSE BLD GHB EST-SCNC: 8.7 (CALC)
GLOBULIN SER CALC-MCNC: 2.7 G/DL (CALC) (ref 1.9–3.7)
GLOBULIN SER CALC-MCNC: 2.7 G/DL (CALC) (ref 1.9–3.7)
GLUCOSE SERPL-MCNC: 138 MG/DL (ref 65–99)
HBA1C MFR BLD: 7.1 % OF TOTAL HGB
HCV AB S/CO SERPL IA: 0.04
HCV AB SERPL QL IA: NORMAL
HDLC SERPL-MCNC: 39 MG/DL
LDLC SERPL CALC-MCNC: 39 MG/DL (CALC)
NONHDLC SERPL-MCNC: 60 MG/DL (CALC)
POTASSIUM SERPL-SCNC: 4 MMOL/L (ref 3.5–5.3)
PROT SERPL-MCNC: 7.3 G/DL (ref 6.1–8.1)
PROT SERPL-MCNC: 7.3 G/DL (ref 6.1–8.1)
SL AMB EGFR AFRICAN AMERICAN: 100 ML/MIN/1.73M2
SL AMB EGFR NON AFRICAN AMERICAN: 86 ML/MIN/1.73M2
SODIUM SERPL-SCNC: 140 MMOL/L (ref 135–146)
TRIGL SERPL-MCNC: 130 MG/DL

## 2019-08-01 ENCOUNTER — OFFICE VISIT (OUTPATIENT)
Dept: INTERNAL MEDICINE CLINIC | Age: 69
End: 2019-08-01
Payer: COMMERCIAL

## 2019-08-01 VITALS
HEART RATE: 68 BPM | SYSTOLIC BLOOD PRESSURE: 142 MMHG | WEIGHT: 263.8 LBS | DIASTOLIC BLOOD PRESSURE: 64 MMHG | OXYGEN SATURATION: 96 % | TEMPERATURE: 96.3 F | BODY MASS INDEX: 34.96 KG/M2 | HEIGHT: 73 IN

## 2019-08-01 DIAGNOSIS — R17 ELEVATED BILIRUBIN: ICD-10-CM

## 2019-08-01 DIAGNOSIS — Z23 NEED FOR TDAP VACCINATION: ICD-10-CM

## 2019-08-01 DIAGNOSIS — Z23 NEED FOR PNEUMOCOCCAL VACCINATION: Primary | ICD-10-CM

## 2019-08-01 DIAGNOSIS — E78.2 COMBINED HYPERLIPIDEMIA: ICD-10-CM

## 2019-08-01 DIAGNOSIS — I10 BENIGN ESSENTIAL HYPERTENSION: ICD-10-CM

## 2019-08-01 DIAGNOSIS — E11.8 TYPE 2 DIABETES MELLITUS WITH COMPLICATION, UNSPECIFIED WHETHER LONG TERM INSULIN USE: ICD-10-CM

## 2019-08-01 DIAGNOSIS — Z12.11 SCREENING FOR MALIGNANT NEOPLASM OF COLON: ICD-10-CM

## 2019-08-01 PROCEDURE — 1101F PT FALLS ASSESS-DOCD LE1/YR: CPT | Performed by: INTERNAL MEDICINE

## 2019-08-01 PROCEDURE — 3008F BODY MASS INDEX DOCD: CPT | Performed by: INTERNAL MEDICINE

## 2019-08-01 PROCEDURE — 99214 OFFICE O/P EST MOD 30 MIN: CPT | Performed by: INTERNAL MEDICINE

## 2019-08-01 PROCEDURE — 3725F SCREEN DEPRESSION PERFORMED: CPT | Performed by: INTERNAL MEDICINE

## 2019-08-01 PROCEDURE — 1160F RVW MEDS BY RX/DR IN RCRD: CPT | Performed by: INTERNAL MEDICINE

## 2019-08-01 NOTE — ASSESSMENT & PLAN NOTE
Pt has elevated Bilirubin, primarily from direct bilirubin  Pt denies any history of jaundice in the past, but does endorse a large amount of tick exposure  Consider Lyme panel, Babesia, and Anaplasmosis work up

## 2019-08-01 NOTE — PROGRESS NOTES
Diabetic Foot Exam    Patient's shoes and socks removed  Right Foot/Ankle   Right Foot Inspection  Skin Exam: skin normal, skin intact, dry skin, callus and callus no warmth, no erythema, no maceration, no abnormal color, no pre-ulcer and no ulcer                          Toe Exam: ROM and strength within normal limits and swelling  Sensory   Vibration: diminished and intact  Proprioception: intact   Monofilament testing: diminished  Vascular  Capillary refills: < 3 seconds  The right DP pulse is 2+  Left Foot/Ankle  Left Foot Inspection  Skin Exam: skin normal, skin intact, dry skin and callusno warmth, no erythema, no maceration, normal color, no pre-ulcer and no ulcer                         Toe Exam: ROM and strength within normal limits and swelling                   Sensory   Vibration: diminished  Proprioception: intact  Monofilament: diminished  Vascular  Capillary refills: < 3 seconds  The left DP pulse is 2+     Assign Risk Category:  No deformity present; ;        Risk: 1

## 2019-08-01 NOTE — PROGRESS NOTES
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  To check to born illnesses and to rule out hemolysis due we do the  Diabetes mellitus ([address] ):   relatively good control hemoglobin was 7 1 patient is being followed closely by his diabetologist    we discussed diet  This is a white thick                  No results for input(s): POCGLU in the last 72 hours  Blood Sugar Average: Last 72 hrs:     Slightly elevated from last visit which was 6 9  Patient follows up with an endocrinologist, he is compliant with all of his medications  Counseled patient on appropriate food hygiene and proper wound care  Benign essential hypertension  Pt BP is elevated today  Pt does not measure the BP at home  Remeasured BP which was 138/60  Pt does endorse a large amount of caffeine intake  Counseled patient about salt and caffeine intake, as well as increased exercise  Encourage patient to peak exercise HR of 105 bpm      Vitals:    08/01/19 0826   BP: 142/64   Pulse: 68   Temp: (!) 96 3 °F (35 7 °C)   SpO2: 96%         Combined hyperlipidemia  Latest lipid panel unremarkable  Pt LDL is well controlled  Continue therapy as perscribed  Elevated bilirubin  Pt has elevated Bilirubin, primarily from direct bilirubin  Pt denies any history of jaundice in the past, but does endorse a large amount of tick exposure  Will order tick exposure panel  BMI Counseling: Body mass index is 34 96 kg/m²  Discussed the patient's BMI with him  The BMI is above average  BMI counseling and education was provided to the patient  Nutrition recommendations include reducing portion sizes, decreasing overall calorie intake, 3-5 servings of fruits/vegetables daily, reducing fast food intake, consuming healthier snacks, decreasing soda and/or juice intake, moderation in carbohydrate intake, increasing intake of lean protein, reducing intake of saturated fat and trans fat and reducing intake of cholesterol   Exercise recommendations include moderate aerobic physical activity for 150 minutes/week  Subjective:     Chief Complaint   Patient presents with    Follow-up     Patient is being seen for 3M follow up DM, HTN, CAD, HLD, anxiety and to review BW results  Patient does not need any refills at this time  Patient ID: Margaret Dey is a 77 y o  male  HPI    The following portions of the patient's history were reviewed and updated as appropriate: allergies, current medications, past family history, past medical history, past social history, past surgical history and problem list     Review of Systems   Constitutional: Negative for chills, diaphoresis, fatigue, fever and unexpected weight change  HENT: Negative for congestion, ear discharge, ear pain, hearing loss, postnasal drip, rhinorrhea, sinus pressure, sinus pain, sneezing and sore throat  Eyes: Negative for photophobia, pain, discharge, redness, itching and visual disturbance  Needs eval, has cataracts   Respiratory: Negative for cough, chest tightness, shortness of breath, wheezing and stridor  Cardiovascular: Negative for chest pain, palpitations and leg swelling  Gastrointestinal: Negative for abdominal distention, abdominal pain, anal bleeding, blood in stool, constipation, diarrhea, nausea, rectal pain and vomiting  Genitourinary: Negative for difficulty urinating, dysuria, hematuria and urgency  Musculoskeletal: Positive for neck stiffness  Negative for arthralgias, gait problem, joint swelling and myalgias  Skin:        Had  A lesion on the ear    Neurological: Negative for dizziness, seizures, facial asymmetry, light-headedness, numbness and headaches  Psychiatric/Behavioral: Negative            Past Medical History:   Diagnosis Date    Cellulitis     last assessed 7/3/13    Diabetes [facility])     last assessed 4/10/13    Gastroenteritis     last assessed 3/30/17    High cholesterol     Hypertension     Rhus dermatitis     last assessed 8/26/15    Skin lesion of face     last assessed 3/30/17         Current Outpatient Medications:     amLODIPine-benazepril (LOTREL) 10-20 MG per capsule, Take 1 capsule by mouth daily, Disp: 90 capsule, Rfl: 1    atorvastatin (LIPITOR) 40 mg tablet, TAKE 1 TABLET BY MOUTH EVERY DAY, Disp: 90 tablet, Rfl: 0    glimepiride (AMARYL) 4 mg tablet, TAKE 1 TABLET BY MOUTH TWICE A DAY, Disp: 180 tablet, Rfl: 1    MELATONIN PO, Take 10-20 mg by mouth daily at bedtime as needed , Disp: , Rfl:     metFORMIN (GLUCOPHAGE) 1000 MG tablet, TAKE 1 TABLET BY MOUTH TWICE A DAY, Disp: 180 tablet, Rfl: 1    Naproxen Sodium (ALEVE PO), Take by mouth as needed, Disp: , Rfl:     NOVOFINE PLUS 32G X 4 MM MISC, USE TO INJECT INSULIN DAILY, Disp: , Rfl: 1    ONETOUCH DELICA LANCETS FINE MISC, Inject under the skin 3 (three) times a day, Disp: 100 each, Rfl: 5    ONETOUCH VERIO test strip, 1 each by Other route 3 (three) times a day Use as instructed (Patient taking differently: Test glucose 3 times daily), Disp: 100 each, Rfl: 5    VICTOZA injection, INJECT 0 3 ML (1 8 MG TOTAL) UNDER THE SKIN DAILY, Disp: 3 pen, Rfl: 3    Allergies   Allergen Reactions    Januvia [Sitagliptin] Other (See Comments)     Unknown reaction, patient can't recall       Social History   Past Surgical History:   Procedure Laterality Date    ROTATOR CUFF REPAIR  2005     Family History   Problem Relation Age of Onset    No Known Problems Family     Diabetes unspecified Mother     No Known Problems Father            Recent Results (from the past 1344 hour(s))   Lipid panel    Collection Time: 07/29/19  7:09 AM   Result Value Ref Range    Total Cholesterol 99 <200 mg/dL    HDL 39 (L) >40 mg/dL    Triglycerides 130 <150 mg/dL    LDL Direct 39 mg/dL (calc)    Chol HDLC Ratio 2 5 <5 0 (calc)    Non-HDL Cholesterol 60 <130 mg/dL (calc)   Comprehensive metabolic panel    Collection Time: 07/29/19  7:09 AM   Result Value Ref Range Glucose, Random 138 (H) 65 - 99 mg/dL    BUN 20 7 - 25 mg/dL    Creatinine 0 91 0 70 - 1 25 mg/dL    eGFR Non  86 > OR = 60 mL/min/1 73m2    eGFR  100 > OR = 60 mL/min/1 73m2    SL AMB BUN/CREATININE RATIO NOT APPLICABLE 6 - 22 (calc)    Sodium 140 135 - 146 mmol/L    Potassium 4 0 3 5 - 5 3 mmol/L    Chloride 103 98 - 110 mmol/L    CO2 27 20 - 32 mmol/L    SL AMB CALCIUM 9 3 8 6 - 10 3 mg/dL    Protein, Total 7 3 6 1 - 8 1 g/dL    Albumin 4 6 3 6 - 5 1 g/dL    Globulin 2 7 1 9 - 3 7 g/dL (calc)    Albumin/Globulin Ratio 1 7 1 0 - 2 5 (calc)    TOTAL BILIRUBIN 1 9 (H) 0 2 - 1 2 mg/dL    Alkaline Phosphatase 61 40 - 115 U/L    AST 15 10 - 35 U/L    ALT 29 9 - 46 U/L   Hepatic function panel    Collection Time: 07/29/19  7:09 AM   Result Value Ref Range    Protein, Total 7 3 6 1 - 8 1 g/dL    Albumin 4 6 3 6 - 5 1 g/dL    Globulin 2 7 1 9 - 3 7 g/dL (calc)    Albumin/Globulin Ratio 1 7 1 0 - 2 5 (calc)    TOTAL BILIRUBIN 1 9 (H) 0 2 - 1 2 mg/dL    Bilirubin, Direct 0 4 (H) < OR = 0 2 mg/dL    Bilirubin, Indirect 1 5 (H) 0 2 - 1 2 mg/dL (calc)    Alkaline Phosphatase 61 40 - 115 U/L    AST 15 10 - 35 U/L    ALT 29 9 - 46 U/L   Hepatitis C Ab W/Refl To HCV RNA, Qn, PCR    Collection Time: 07/29/19  7:09 AM   Result Value Ref Range    HEP C AB NON-REACTIVE NON-REACTIVE    Signal to Cut-Off 0 04 <1 00   Hemoglobin A1C With EAG    Collection Time: 07/29/19  7:09 AM   Result Value Ref Range    Hemoglobin A1C 7 1 (H) <5 7 % of total Hgb    Estimated Average Glucose 157 (calc)    Estimated Average Glucose (mmol/L) 8 7 (calc)            Physical Exam   Constitutional: He is oriented to person, place, and time  He appears well-developed and well-nourished  No distress  obese   HENT:   Head: Normocephalic and atraumatic  Left Ear: External ear normal    Mouth/Throat: No oropharyngeal exudate     Nares full red  Left  Ear s/p lesion noted   Eyes: Conjunctivae are normal  Right eye exhibits no discharge  Left eye exhibits no discharge  No scleral icterus  arcus   Neck: Normal range of motion  Neck supple  No JVD present  Cardiovascular: Normal rate and regular rhythm  Exam reveals no gallop and no friction rub  No murmur heard  Pulmonary/Chest: Effort normal  No stridor  No respiratory distress  He has no wheezes  He has no rales  He exhibits no tenderness  Abdominal: Soft  Bowel sounds are normal  He exhibits no distension and no mass  There is no tenderness  There is no guarding  Musculoskeletal: Normal range of motion  He exhibits no edema or deformity  Neurological: He is alert and oriented to person, place, and time  He displays normal reflexes  Skin: Skin is warm  He is not diaphoretic  Psychiatric: He has a normal mood and affect  His behavior is normal  Judgment and thought content normal    Vitals reviewed          Objective:    /64 (BP Location: Left arm, Patient Position: Sitting, Cuff Size: Large)   Pulse 68   Temp (!) 96 3 °F (35 7 °C) (Tympanic)   Ht 6' 0 84" (1 85 m)   Wt 120 kg (263 lb 12 8 oz)   SpO2 96%   BMI 34 96 kg/m²

## 2019-08-01 NOTE — ASSESSMENT & PLAN NOTE
Lab Results   Component Value Date    HGBA1C 7 1 (H) 07/29/2019       No results for input(s): POCGLU in the last 72 hours  Blood Sugar Average: Last 72 hrs:     Slightly elevated from last visit which was 6 9  Patient follows up with an endocrinologist, he is compliant with all of his medications

## 2019-08-01 NOTE — ASSESSMENT & PLAN NOTE
Pt BP is elevated today  Pt does not measure the BP at home  Consider increase Lotrel dose       Vitals:    08/01/19 0826   BP: 142/64   Pulse: 68   Temp: (!) 96 3 °F (35 7 °C)   SpO2: 96%

## 2019-08-05 ENCOUNTER — OFFICE VISIT (OUTPATIENT)
Dept: ENDOCRINOLOGY | Facility: CLINIC | Age: 69
End: 2019-08-05
Payer: COMMERCIAL

## 2019-08-05 VITALS
DIASTOLIC BLOOD PRESSURE: 60 MMHG | SYSTOLIC BLOOD PRESSURE: 150 MMHG | WEIGHT: 264 LBS | BODY MASS INDEX: 35.76 KG/M2 | HEIGHT: 72 IN

## 2019-08-05 DIAGNOSIS — E11.8 TYPE 2 DIABETES MELLITUS WITH COMPLICATION, UNSPECIFIED WHETHER LONG TERM INSULIN USE: ICD-10-CM

## 2019-08-05 DIAGNOSIS — E11.65 TYPE 2 DIABETES MELLITUS WITH HYPERGLYCEMIA, WITHOUT LONG-TERM CURRENT USE OF INSULIN (HCC): Primary | ICD-10-CM

## 2019-08-05 DIAGNOSIS — E78.2 COMBINED HYPERLIPIDEMIA: ICD-10-CM

## 2019-08-05 DIAGNOSIS — I10 BENIGN ESSENTIAL HYPERTENSION: ICD-10-CM

## 2019-08-05 PROCEDURE — 99214 OFFICE O/P EST MOD 30 MIN: CPT | Performed by: PHYSICIAN ASSISTANT

## 2019-08-05 RX ORDER — BLOOD SUGAR DIAGNOSTIC
1 STRIP MISCELLANEOUS 2 TIMES DAILY
Qty: 100 EACH | Refills: 5
Start: 2019-08-05 | End: 2019-11-13 | Stop reason: SDUPTHER

## 2019-08-05 NOTE — PROGRESS NOTES
Patient Progress Note      CC: DM2      Referring Provider  Md Sergey Patel [address] [place] [address] [address]     History of Present Illness:   Cheyanne Sarmiento is a 77 y o  male with a history of type 2 diabetes without long term use of insulin  Diabetes course has been stable  Complications of DM: CAD, neuropathy  Denies recent illness or hospitalizations  Denies recent severe hypoglycemic or severe hyperglycemic episodes  Denies any issues with his current regimen  Home glucose monitoring: are performed regularly, occasionally skips days    Home blood glucose readings:   Before breakfast:  mg/dl, most readings less than 150 mg/dl  Before lunch:  mg/dl, sporadic readings  Before dinner:  mg/dl, checks sporadically but most readings in acceptable range  Bedtime: 219 mg/dl, infrequently checks  Range over the past month 105-219 mg/dl    Current regimen: metformin 1000 mg BID, glimepiride 4 mg BID, Victoza 1 8 mg daily   compliant most of the time, denies any side effects from medications  Injects in: abdomen  Rotates sites: Yes  Hypoglycemic episodes: No, rare  H/o of hypoglycemia causing hospitalization or Intervention such as glucagon injection  or ambulance call :  No  Hypoglycemia symptoms: jitteriness  Treatment of hypoglycemia: candy     Medic alert tag: recommended: Yes    Diet: 3 meals per day, 2 snacks per day  Timing of meals is predictable  Diabetic diet compliance:  compliant most of the time  Activity: Daily activity is predictable: Yes  Takes the dog for a walk, 3-5 times weeks  Does yard work                Further diabetic ROS: no polyuria or polydipsia, no chest pain, dyspnea        Ophthamology: sees routinely, August 2019, no retinopathy  Podiatry: sees routinely, April 2019    Has hypertension: on ACE inhibitor/ARB, compliant most of the time  Has hyperlipidemia: on statin - tolerating well, no myalgias   compliant most of the time, denies any side effects from medications    Thyroid disorders: No  History of pancreatitis: No    Patient Active Problem List   Diagnosis    Allergic rhinitis    Anxiety    Basal cell carcinoma of face    Benign essential hypertension    Coronary artery disease with history of myocardial infarction without history of CABG    Combined hyperlipidemia    [facility]    Welcome to Medicare preventive visit    Elevated bilirubin      Past Medical History:   Diagnosis Date    Cellulitis     last assessed 7/3/13    Diabetes ([facility]     last assessed 4/10/13    Gastroenteritis     last assessed 3/30/17    High cholesterol     Hypertension     Rhus dermatitis     last assessed 8/26/15    Skin lesion of face     last assessed 3/30/17      Past Surgical History:   Procedure Laterality Date    ROTATOR CUFF REPAIR  2005      Family History   Problem Relation Age of Onset    No Known Problems Family     Diabetes unspecified Mother     No Known Problems Father      Social History     Tobacco Use    Smoking status: Former Smoker     Types: Cigarettes    Smokeless tobacco: Never Used    Tobacco comment: Quit    Substance Use Topics    Alcohol use: Yes     Comment: rare- 1 if out to eat- rarely      Allergies   Allergen Reactions    Januvia [Sitagliptin] Other (See Comments)     Unknown reaction, patient can't recall         Current Outpatient Medications:     amLODIPine-benazepril (LOTREL) 10-20 MG per capsule, Take 1 capsule by mouth daily, Disp: 90 capsule, Rfl: 1    atorvastatin (LIPITOR) 40 mg tablet, TAKE 1 TABLET BY MOUTH EVERY DAY, Disp: 90 tablet, Rfl: 0    glimepiride (AMARYL) 4 mg tablet, TAKE 1 TABLET BY MOUTH TWICE A DAY, Disp: 180 tablet, Rfl: 1    MELATONIN PO, Take 10-20 mg by mouth daily at bedtime as needed , Disp: , Rfl:     metFORMIN (GLUCOPHAGE) 1000 MG tablet, TAKE 1 TABLET BY MOUTH TWICE A DAY, Disp: 180 tablet, Rfl: 1    Naproxen Sodium (ALEVE PO), Take by mouth as needed, Disp: , Rfl:   NOVOFINE PLUS 32G X 4 MM MISC, USE TO INJECT INSULIN DAILY, Disp: , Rfl: 1    ONETOUCH DELICA LANCETS FINE MISC, Inject under the skin 3 (three) times a day, Disp: 100 each, Rfl: 5    ONETOUCH VERIO test strip, 1 each by Other route 2 (two) times a day Use as instructed, Disp: 100 each, Rfl: 5    VICTOZA injection, INJECT 0 3 ML (1 8 MG TOTAL) UNDER THE SKIN DAILY, Disp: 3 pen, Rfl: 3  Review of Systems   Constitutional: Negative for activity change, appetite change, fatigue and unexpected weight change  HENT: Negative for trouble swallowing  Eyes: Negative for visual disturbance  Respiratory: Negative for shortness of breath  Cardiovascular: Negative for chest pain and palpitations  Gastrointestinal: Negative for constipation and diarrhea  Endocrine: Negative for polydipsia and polyuria  Musculoskeletal: Negative  Skin: Negative for wound  Neurological: Positive for numbness (mild, intermittent)  Psychiatric/Behavioral: Negative  Physical Exam:  Body mass index is 35 8 kg/m²  /60   Ht 6' (1 829 m)   Wt 120 kg (264 lb)   BMI 35 80 kg/m²    Wt Readings from Last 3 Encounters:   08/05/19 120 kg (264 lb)   08/01/19 120 kg (263 lb 12 8 oz)   03/21/19 120 kg (265 lb)       Physical Exam   Constitutional: He appears well-developed and well-nourished  HENT:   Head: Normocephalic  Eyes: Pupils are equal, round, and reactive to light  EOM are normal  No scleral icterus  Neck: Neck supple  No thyromegaly present  Cardiovascular: Normal rate and regular rhythm  No murmur heard  Pulses:       Radial pulses are 2+ on the right side, and 2+ on the left side  Pulmonary/Chest: Effort normal and breath sounds normal  No respiratory distress  He has no wheezes  Neurological: He is alert  He has normal reflexes  Skin: Skin is warm and dry  Psychiatric: He has a normal mood and affect  Nursing note and vitals reviewed      Patient's shoes and socks were not removed        Labs:   Component      Latest Ref Rng & Units 3/18/2019 7/29/2019            7:09 AM   Glucose, Random      65 - 99 mg/dL 185 (H) 138 (H)   BUN      7 - 25 mg/dL 16 20   Creatinine      0 70 - 1 25 mg/dL 0 91 0 91   eGFR Non       > OR = 60 mL/min/1 73m2 87 86   eGFR       > OR = 60 mL/min/1 73m2 101 100   SL AMB BUN/CREATININE RATIO      6 - 22 (calc) NOT APPLICABLE NOT APPLICABLE   Sodium      135 - 146 mmol/L 139 140   Potassium      3 5 - 5 3 mmol/L 4 0 4 0   Chloride      98 - 110 mmol/L 105 103   CO2      20 - 32 mmol/L 24 27   SL AMB CALCIUM      8 6 - 10 3 mg/dL 9 3 9 3   Total Protein      6 1 - 8 1 g/dL  7 3   Albumin      3 6 - 5 1 g/dL  4 6   Globulin      1 9 - 3 7 g/dL (calc)  2 7   Albumin/Globulin Ratio      1 0 - 2 5 (calc)  1 7   TOTAL BILIRUBIN      0 2 - 1 2 mg/dL  1 9 (H)   Alkaline Phosphatase      40 - 115 U/L  61   AST      10 - 35 U/L  15   ALT      9 - 46 U/L  29   Cholesterol      <200 mg/dL 97 99   HDL      >40 mg/dL 39 (L) 39 (L)   Triglycerides      <150 mg/dL 86 130   LDL Direct      mg/dL (calc) 41 39   Chol HDLC Ratio      <5 0 (calc) 2 5 2 5   Non-HDL Cholesterol      <130 mg/dL (calc) 58 60   Hemoglobin A1C      <5 7 % of total Hgb 6 9 (H)    EAG      (calc)       Component      Latest Ref Rng & Units 7/29/2019 7/29/2019           7:09 AM  7:09 AM   Glucose, Random      65 - 99 mg/dL     BUN      7 - 25 mg/dL     Creatinine      0 70 - 1 25 mg/dL     eGFR Non       > OR = 60 mL/min/1 73m2     eGFR       > OR = 60 mL/min/1 73m2     SL AMB BUN/CREATININE RATIO      6 - 22 (calc)     Sodium      135 - 146 mmol/L     Potassium      3 5 - 5 3 mmol/L     Chloride      98 - 110 mmol/L     CO2      20 - 32 mmol/L     SL AMB CALCIUM      8 6 - 10 3 mg/dL     Total Protein      6 1 - 8 1 g/dL     Albumin      3 6 - 5 1 g/dL     Globulin      1 9 - 3 7 g/dL (calc)     Albumin/Globulin Ratio      1 0 - 2 5 (calc) TOTAL BILIRUBIN      0 2 - 1 2 mg/dL     Alkaline Phosphatase      40 - 115 U/L     AST      10 - 35 U/L     ALT      9 - 46 U/L     Cholesterol      <200 mg/dL     HDL      >40 mg/dL     Triglycerides      <150 mg/dL     LDL Direct      mg/dL (calc)     Chol HDLC Ratio      <5 0 (calc)     Non-HDL Cholesterol      <130 mg/dL (calc)     Hemoglobin A1C      <5 7 % of total Hgb 7 1 (H)    EAG      (calc) 157 8 7         Plan:    Diagnoses and all orders for this visit:    Type 2 diabetes mellitus with hyperglycemia, without long-term current use of insulin (HCC)  HGA1C 7 1%  Treatment regimen:  Continue current treatment  Advised to limit carb intake at bedtime unless blood sugar is low  Discussed risks/complications associated with uncontrolled diabetes  Advised to adhere to diabetic diet, and recommended staying active/exercising routinely  Keep carbohydrates consistent to limit blood glucose fluctuations  Advised to call if blood sugars less than 70 mg/dl or over 300 mg/dl  Check blood glucose 2 times a day  Discussed symptoms and treatment of hypoglycemia  Recommended routine follow-up with podiatry and ophthalmology  Ordered blood work to complete prior to next visit  -     Hemoglobin A1C; Future  -     Basic metabolic panel; Future  -     Microalbumin / creatinine urine ratio; Future  -     ONETOUCH VERIO test strip; 1 each by Other route 2 (two) times a day Use as instructed    Benign essential hypertension  Blood pressure elevated  Advised to monitor blood pressure at home and follow-up with PCP if remaining elevated, systolic at or over 517 and/or diastolic at or over 90  For now continue current treatment  -     Basic metabolic panel; Future    Combined hyperlipidemia  LDL 39, at goal   Continue statin therapy    Discussed with the patient diagnosis and treatment and all questions fully answered  He will call me if any problems arise      Counseled patient on diagnostic results, prognosis, risk and benefit of treatment options, instruction for management, importance of treatment compliance, risk factor reduction and impressions        Jackie Flores PA-C

## 2019-08-05 NOTE — PATIENT INSTRUCTIONS
Hypoglycemia instructions   Bernabe Garnica  8/5/2019  [id number]    Low Blood Sugar    Steps to treat low blood sugar  1  Test blood sugar if you have symptoms of low blood sugar:   Low Blood Sugar Symptoms:  o Sweaty  o Dizzy  o Rapid heartbeat  o Shaky  o Bad mood  o Hungry      2  Treat blood sugar less than 70 with 15 grams of fast-acting carbohydrate:   Examples of 15 grams Fast-Acting Carbohydrate:  o 4 oz juice  o 4 oz regular soda  o 3-4 glucose tablets (chew)  o 3-4 hard candies (chew)          3  Wait 15 minutes and test your blood sugar again     4   If blood sugar is less than 100, repeat steps 2-3     5  When your blood sugar is 100 or more, eat a snack if it will be longer than one hour until your next meal  The snack should be 15 grams of carbohydrate and a protein:   Examples of snacks:  o ½ sandwich  o 6 crackers with cheese  o Piece of fruit with cheese or peanut butter  o 6 crackers with peanut butter

## 2019-08-08 LAB
A PHAGOCYTOPH DNA BLD QL NAA+PROBE: NOT DETECTED
B BURGDOR DNA BLD QL NAA+PROBE: NOT DETECTED
B MICROTI DNA BLD QL NAA+PROBE: NOT DETECTED
B MIYAMOTOI FLAB SPEC QL NAA+PROBE: NOT DETECTED
BASOPHILS # BLD AUTO: 50 CELLS/UL (ref 0–200)
BASOPHILS NFR BLD AUTO: 0.9 %
E CHAFFEENSIS DNA BLD QL NAA+PROBE: NOT DETECTED
EOSINOPHIL # BLD AUTO: 151 CELLS/UL (ref 15–500)
EOSINOPHIL NFR BLD AUTO: 2.7 %
ERYTHROCYTE [DISTWIDTH] IN BLOOD BY AUTOMATED COUNT: 13 % (ref 11–15)
HCT VFR BLD AUTO: 45.9 % (ref 38.5–50)
HGB BLD-MCNC: 15.6 G/DL (ref 13.2–17.1)
LDH SERPL-CCNC: 134 U/L (ref 120–250)
LYMPHOCYTES # BLD AUTO: 1792 CELLS/UL (ref 850–3900)
LYMPHOCYTES NFR BLD AUTO: 32 %
MCH RBC QN AUTO: 31.6 PG (ref 27–33)
MCHC RBC AUTO-ENTMCNC: 34 G/DL (ref 32–36)
MCV RBC AUTO: 92.9 FL (ref 80–100)
MONOCYTES # BLD AUTO: 442 CELLS/UL (ref 200–950)
MONOCYTES NFR BLD AUTO: 7.9 %
NEUTROPHILS # BLD AUTO: 3164 CELLS/UL (ref 1500–7800)
NEUTROPHILS NFR BLD AUTO: 56.5 %
PLATELET # BLD AUTO: 171 THOUSAND/UL (ref 140–400)
PMV BLD REES-ECKER: 9.4 FL (ref 7.5–12.5)
RBC # BLD AUTO: 4.94 MILLION/UL (ref 4.2–5.8)
WBC # BLD AUTO: 5.6 THOUSAND/UL (ref 3.8–10.8)

## 2019-08-20 DIAGNOSIS — Z79.4 TYPE 2 DIABETES MELLITUS WITH COMPLICATION, WITH LONG-TERM CURRENT USE OF INSULIN (HCC): ICD-10-CM

## 2019-08-20 DIAGNOSIS — E78.2 MIXED HYPERLIPIDEMIA: ICD-10-CM

## 2019-08-20 DIAGNOSIS — E11.8 TYPE 2 DIABETES MELLITUS WITH COMPLICATION, WITH LONG-TERM CURRENT USE OF INSULIN (HCC): ICD-10-CM

## 2019-08-20 RX ORDER — LIRAGLUTIDE 6 MG/ML
INJECTION SUBCUTANEOUS
Qty: 27 ML | Refills: 1 | Status: SHIPPED | OUTPATIENT
Start: 2019-08-20 | End: 2020-04-13 | Stop reason: SDUPTHER

## 2019-08-22 RX ORDER — ATORVASTATIN CALCIUM 40 MG/1
TABLET, FILM COATED ORAL
Qty: 90 TABLET | Refills: 0 | Status: SHIPPED | OUTPATIENT
Start: 2019-08-22 | End: 2019-12-02 | Stop reason: SDUPTHER

## 2019-11-13 DIAGNOSIS — E11.8 TYPE 2 DIABETES MELLITUS WITH COMPLICATION, WITH LONG-TERM CURRENT USE OF INSULIN (HCC): ICD-10-CM

## 2019-11-13 DIAGNOSIS — Z79.4 TYPE 2 DIABETES MELLITUS WITH COMPLICATION, WITH LONG-TERM CURRENT USE OF INSULIN (HCC): ICD-10-CM

## 2019-11-13 DIAGNOSIS — E11.8 TYPE 2 DIABETES MELLITUS WITH COMPLICATION (HCC): ICD-10-CM

## 2019-11-13 RX ORDER — BLOOD SUGAR DIAGNOSTIC
1 STRIP MISCELLANEOUS 2 TIMES DAILY
Qty: 200 EACH | Refills: 3 | Status: SHIPPED | OUTPATIENT
Start: 2019-11-13 | End: 2021-09-27 | Stop reason: SDUPTHER

## 2019-11-14 DIAGNOSIS — E11.8 TYPE 2 DIABETES MELLITUS WITH COMPLICATION (HCC): ICD-10-CM

## 2019-11-14 RX ORDER — GLIMEPIRIDE 4 MG/1
TABLET ORAL
Qty: 180 TABLET | Refills: 1 | Status: SHIPPED | OUTPATIENT
Start: 2019-11-14 | End: 2020-06-02

## 2019-11-17 DIAGNOSIS — I10 ESSENTIAL HYPERTENSION: ICD-10-CM

## 2019-11-22 ENCOUNTER — TELEPHONE (OUTPATIENT)
Dept: FAMILY MEDICINE CLINIC | Facility: CLINIC | Age: 69
End: 2019-11-22

## 2019-11-22 NOTE — TELEPHONE ENCOUNTER
Pt called requesting med refill for Amlodipine it has been over a year since seen is over due for med check  Please call and schedule pt 30 min slot

## 2019-11-23 RX ORDER — AMLODIPINE BESYLATE AND BENAZEPRIL HYDROCHLORIDE 10; 20 MG/1; MG/1
CAPSULE ORAL
Qty: 90 CAPSULE | Refills: 1 | OUTPATIENT
Start: 2019-11-23

## 2019-12-02 DIAGNOSIS — E78.2 MIXED HYPERLIPIDEMIA: ICD-10-CM

## 2019-12-02 DIAGNOSIS — I10 ESSENTIAL HYPERTENSION: ICD-10-CM

## 2019-12-02 RX ORDER — AMLODIPINE BESYLATE AND BENAZEPRIL HYDROCHLORIDE 10; 20 MG/1; MG/1
CAPSULE ORAL
Qty: 90 CAPSULE | Refills: 1 | Status: SHIPPED | OUTPATIENT
Start: 2019-12-02 | End: 2020-06-24 | Stop reason: SDUPTHER

## 2019-12-02 RX ORDER — ATORVASTATIN CALCIUM 40 MG/1
TABLET, FILM COATED ORAL
Qty: 90 TABLET | Refills: 1 | Status: SHIPPED | OUTPATIENT
Start: 2019-12-02 | End: 2020-07-15 | Stop reason: SDUPTHER

## 2019-12-02 NOTE — TELEPHONE ENCOUNTER
This patient called the office today and would like a refill on his Atorvastatin and Amlodipine and the Pharmacy is [facility] [address]

## 2019-12-04 ENCOUNTER — TELEPHONE (OUTPATIENT)
Dept: ENDOCRINOLOGY | Facility: CLINIC | Age: 69
End: 2019-12-04

## 2019-12-09 LAB
ALBUMIN/CREAT UR: 7 MCG/MG CREAT
BUN SERPL-MCNC: 22 MG/DL (ref 7–25)
BUN/CREAT SERPL: ABNORMAL (CALC) (ref 6–22)
CALCIUM SERPL-MCNC: 9.4 MG/DL (ref 8.6–10.3)
CHLORIDE SERPL-SCNC: 105 MMOL/L (ref 98–110)
CO2 SERPL-SCNC: 27 MMOL/L (ref 20–32)
CREAT SERPL-MCNC: 0.82 MG/DL (ref 0.7–1.25)
CREAT UR-MCNC: 113 MG/DL (ref 20–320)
GLUCOSE SERPL-MCNC: 162 MG/DL (ref 65–99)
HBA1C MFR BLD: 7.9 % OF TOTAL HGB
MICROALBUMIN UR-MCNC: 0.8 MG/DL
POTASSIUM SERPL-SCNC: 4.1 MMOL/L (ref 3.5–5.3)
SL AMB EGFR AFRICAN AMERICAN: 105 ML/MIN/1.73M2
SL AMB EGFR NON AFRICAN AMERICAN: 91 ML/MIN/1.73M2
SODIUM SERPL-SCNC: 141 MMOL/L (ref 135–146)

## 2019-12-11 ENCOUNTER — OFFICE VISIT (OUTPATIENT)
Dept: INTERNAL MEDICINE CLINIC | Age: 69
End: 2019-12-11
Payer: COMMERCIAL

## 2019-12-11 VITALS
WEIGHT: 266.4 LBS | SYSTOLIC BLOOD PRESSURE: 144 MMHG | HEIGHT: 73 IN | HEART RATE: 96 BPM | DIASTOLIC BLOOD PRESSURE: 70 MMHG | OXYGEN SATURATION: 97 % | BODY MASS INDEX: 35.31 KG/M2 | TEMPERATURE: 99.1 F

## 2019-12-11 DIAGNOSIS — I10 BENIGN ESSENTIAL HYPERTENSION: ICD-10-CM

## 2019-12-11 DIAGNOSIS — R09.82 POST-NASAL DRIP: ICD-10-CM

## 2019-12-11 DIAGNOSIS — E11.65 TYPE 2 DIABETES MELLITUS WITH HYPERGLYCEMIA, WITHOUT LONG-TERM CURRENT USE OF INSULIN (HCC): ICD-10-CM

## 2019-12-11 DIAGNOSIS — Z12.11 COLON CANCER SCREENING: Primary | ICD-10-CM

## 2019-12-11 DIAGNOSIS — J39.2 DRY THROAT: ICD-10-CM

## 2019-12-11 DIAGNOSIS — F41.9 ANXIETY: ICD-10-CM

## 2019-12-11 DIAGNOSIS — E78.2 COMBINED HYPERLIPIDEMIA: ICD-10-CM

## 2019-12-11 PROCEDURE — 3008F BODY MASS INDEX DOCD: CPT | Performed by: INTERNAL MEDICINE

## 2019-12-11 PROCEDURE — 99214 OFFICE O/P EST MOD 30 MIN: CPT | Performed by: INTERNAL MEDICINE

## 2019-12-11 PROCEDURE — 1160F RVW MEDS BY RX/DR IN RCRD: CPT | Performed by: INTERNAL MEDICINE

## 2019-12-11 RX ORDER — FLUTICASONE PROPIONATE 50 MCG
2 SPRAY, SUSPENSION (ML) NASAL DAILY
Qty: 1 BOTTLE | Refills: 1 | Status: SHIPPED | OUTPATIENT
Start: 2019-12-11 | End: 2020-08-05

## 2019-12-11 NOTE — PATIENT INSTRUCTIONS
1  Use Breath Right and nasal spray  2  Try to use the [facility] for dryness  3  You could also try using AYR saline gel  4  Continue to take all medications as prescribed  5  Continue to monitor blood sugar at home  6  Referral to Podiatry  7  Feliz Mir  8  Limit your salt and caffeine intake  9  Continue minimizing processed foods and increase natural foods in diet  10  Minimize white starches, carbohydrates, and concentration sugars from diet  11  Eat smaller portions and decrease caloric intake  12  Try to exercise at least 150 min/week  13  Schedule Medicare Annual Wellness Visit  14  Follow up in 4 months or as needed

## 2019-12-11 NOTE — PROGRESS NOTES
Chief Complaint   Patient presents with    Follow-up     review labs 12/6/19    Ear Problem     right side, thinks got bite by a bug    Dry throat     reports that when goes to sleep at night throat gets dry, when laying on side has trouble breathing from nose       Assessment/Plan:    1  Dry throat - Pt complains of having dry throat when he sleeps  He states he is having trouble breathing from his nose when he is laying on his side  He states when he is sitting upright, he can breath through his nose with no problem  I advised him to continue to use Breath Right and Flonase nasal spray  He was also advised to use a [employer]  I will refer him to ENT, Dr Bee Jo  2  Soreness in ear - Pt also complains of soreness in his right ear  He thinks this maybe due to a bug bite  In exam, there were no lumps or wax noted and he had a good light reflex  However, he was referred to ENT for both problems  3  DM - Pts hemoglobin A1C was 7 9 on 12/6/19  His glucose level was 162  Micro albumin was within normal range  He states he has increased his food intake which may be the cause of increased sugar levels  He also has been stressed due to his mothers surgery  Pt continues to see his dietician  He will continue with his medication, diet, and exercise regimen as well as continue to monitor his sugar levels at home  I also put in a referral to podiatry  4  HTN - Pts BP in the office today was 144/70 while sitting  He was advised to continue to limit his salt and caffeine intake  If continue to be elevated, I will increase his BP medication  5  BMI - Pts BMI in the office today was 34 93  He was advised the following: Continue minimizing processed foods and increase natural foods in diet  Minimize white starches, carbohydrates, and concentration sugars from diet  Eat smaller portions and decrease caloric intake  Try to exercise at least 150 min/week  6  Health Maintenance - He is due for his AWV   He is also due for his Hep B vaccine  Follow up in 4 months or as needed  I have spent 30 minutes with Patient  today in which greater than 50% of this time was spent in counseling/coordination of care regarding Intructions for management, Patient and family education and Importance of tx compliance  BMI Counseling: Body mass index is 34 93 kg/m²  The BMI is above normal  Nutrition recommendations include decreasing portion sizes, encouraging healthy choices of fruits and vegetables, decreasing fast food intake, consuming healthier snacks, limiting drinks that contain sugar, moderation in carbohydrate intake and reducing intake of cholesterol  Exercise recommendations include moderate physical activity 150 minutes/week  No pharmacotherapy was ordered  Romie Huber was seen today for follow-up, ear problem and dry throat  Diagnoses and all orders for this visit:    Colon cancer screening  -     Cologuard; Future    Type 2 diabetes mellitus with hyperglycemia, without long-term current use of insulin ([facility] )  -     Ambulatory referral to Podiatry; Future    Benign essential hypertension    Anxiety    Combined hyperlipidemia    Dry throat  -     Ambulatory Referral to Otolaryngology; Future    Post-nasal drip  -     fluticasone (FLONASE) 50 mcg/act nasal spray; 2 sprays into each nostril daily        Subjective:      Patient ID: Tuyet Degroot is a 77 y o  male  This is the case of a 77year old male with a PMHx of DM presenting in the office with a cc of dry throat  He complains of having dry throat when he sleeps  He states he is having trouble breathing from his nose when he is laying on his side  Kyra Betancourt states when he is sitting upright, he can breath through his nose with no problem  Pt also complains of soreness in his right ear  He thinks this maybe due to a bug bite  Pt also states he has been under stress due to his mother's surgery  Lab work was reviewed and discussed with the patient  The following portions of the patient's history were reviewed and updated as appropriate: allergies, current medications, past family history, past medical history, past social history, past surgical history and problem list     Review of Systems   Constitutional: Positive for unexpected weight change (eating more)  Negative for chills, diaphoresis, fatigue and fever  HENT: Positive for ear pain (lump in rt ear), facial swelling (at night) and sinus pressure  Negative for congestion, ear discharge, hearing loss, mouth sores, nosebleeds, postnasal drip, rhinorrhea, sinus pain, sneezing, sore throat, tinnitus, trouble swallowing and voice change  Eyes: Positive for visual disturbance (needs to see optho)  Respiratory: Negative for cough, choking, chest tightness, shortness of breath, wheezing and stridor  Cardiovascular: Negative for chest pain, palpitations and leg swelling  Gastrointestinal: Negative for abdominal distention, abdominal pain, anal bleeding, blood in stool, constipation, diarrhea and nausea  Endocrine: Negative for cold intolerance and heat intolerance  Genitourinary: Negative for difficulty urinating, genital sores, hematuria, testicular pain and urgency  Musculoskeletal: Negative for arthralgias, back pain, gait problem, joint swelling and myalgias  Skin: Negative  Neurological: Negative for dizziness, tremors, seizures, syncope, facial asymmetry, speech difficulty, weakness, light-headedness, numbness and headaches  Hematological: Does not bruise/bleed easily  Psychiatric/Behavioral: Negative          Allergies   Allergen Reactions    Januvia [Sitagliptin] Other (See Comments)     Unknown reaction, patient can't recall     Past Medical History:   Diagnosis Date    Cellulitis     last assessed 7/3/13    Diabetes ([facility] )     last assessed 4/10/13    Gastroenteritis     last assessed 3/30/17    High cholesterol     Hypertension     Rhus dermatitis     last assessed 8/26/15    Skin lesion of face     last assessed 3/30/17     Current Outpatient Medications on File Prior to Visit   Medication Sig Dispense Refill    amLODIPine-benazepril (LOTREL) 10-20 MG per capsule TAKE 1 CAPSULE BY MOUTH EVERY DAY 90 capsule 1    atorvastatin (LIPITOR) 40 mg tablet TAKE 1 TABLET BY MOUTH EVERY DAY 90 tablet 1    glimepiride (AMARYL) 4 mg tablet TAKE 1 TABLET BY MOUTH TWICE A  tablet 1    MELATONIN PO Take 10-20 mg by mouth daily at bedtime as needed       metFORMIN (GLUCOPHAGE) 1000 MG tablet TAKE 1 TABLET BY MOUTH TWICE A  tablet 1    Naproxen Sodium (ALEVE PO) Take by mouth as needed      NOVOFINE PLUS 32G X 4 MM MISC USE TO INJECT INSULIN DAILY  1    ONETOUCH DELICA LANCETS FINE MISC Inject under the skin 2 (two) times a day 200 each 5    ONETOUCH VERIO test strip 1 each by Other route 2 (two) times a day Use as instructed 200 each 3    VICTOZA injection INJECT 1 8 MG UNDER THE SKIN ONCE DAILY 27 mL 1     No current facility-administered medications on file prior to visit        Past Surgical History:   Procedure Laterality Date    ROTATOR CUFF REPAIR       Social History     Socioeconomic History    Marital status: Single     Spouse name: Not on file    Number of children: Not on file    Years of education: Not on file    Highest education level: Not on file   Occupational History    Not on file   Social Needs    Financial resource strain: Not on file    Food insecurity:     Worry: Not on file     Inability: Not on file    Transportation needs:     Medical: Not on file     Non-medical: Not on file   Tobacco Use    Smoking status: Former Smoker     Years: 3 00     Types: Cigarettes, Cigars     Last attempt to quit: 1989     Years since quittin 0    Smokeless tobacco: Never Used   Substance and Sexual Activity    Alcohol use: Yes     Comment: rare- 1 if out to eat- rarely     Drug use: No    Sexual activity: Not Currently Lifestyle    Physical activity:     Days per week: Not on file     Minutes per session: Not on file    Stress: Not on file   Relationships    Social connections:     Talks on phone: Not on file     Gets together: Not on file     Attends Anabaptist service: Not on file     Active member of club or organization: Not on file     Attends meetings of clubs or organizations: Not on file     Relationship status: Not on file    Intimate partner violence:     Fear of current or ex partner: Not on file     Emotionally abused: Not on file     Physically abused: Not on file     Forced sexual activity: Not on file   Other Topics Concern    Not on file   Social History Narrative    Not on file       Objective:      /70 (BP Location: Left arm, Patient Position: Sitting, Cuff Size: Large)   Pulse 96   Temp 99 1 °F (37 3 °C) (Tympanic)   Ht 6' 1 23" (1 86 m)   Wt 121 kg (266 lb 6 4 oz)   SpO2 97%   BMI 34 93 kg/m²          Physical Exam   Constitutional: He is oriented to person, place, and time  He appears well-developed and well-nourished  No distress  HENT:   Head: Normocephalic and atraumatic  Right Ear: External ear normal    Left Ear: External ear normal    Nose: Nose normal    Mouth/Throat: Oropharynx is clear and moist  No oropharyngeal exudate  Eyes: EOM are normal  Right eye exhibits no discharge  Left eye exhibits no discharge  No scleral icterus  Neck: Neck supple  No JVD present  No thyromegaly present  Cardiovascular: Normal rate, regular rhythm and normal heart sounds  No murmur heard  Pulmonary/Chest: Effort normal and breath sounds normal  No respiratory distress  He has no wheezes  He has no rales  Abdominal: Soft  Bowel sounds are normal  He exhibits no distension  There is no tenderness  There is no rebound and no guarding  A hernia (ventral and umbilical) is present  Ventral hernia and umbilical hernia    Musculoskeletal: He exhibits no edema, tenderness or deformity  Neurological: He is alert and oriented to person, place, and time  He displays normal reflexes  No cranial nerve deficit  Skin: Skin is warm and dry  He is not diaphoretic  There is erythema (hand)  Psychiatric: He has a normal mood and affect  His behavior is normal  Judgment and thought content normal    Nursing note and vitals reviewed  Attestation:   By signing my name below, Mary Yusuf, attest that this documentation has been prepared under the direction and in the presence of Shadia Gordon MD  Electronically Signed: Dakota Ortega[address]  12/11/19      I, Shadia Gordon, personally performed the services described in this documentation  All medical record entries made by the scribe were at my direction and in my presence  I have reviewed the chart and discharge instructions and agree that the record reflects my personal performance and is accurate and complete   Shadia Gordon MD  12/11/19

## 2019-12-12 ENCOUNTER — TELEPHONE (OUTPATIENT)
Dept: INTERNAL MEDICINE CLINIC | Age: 69
End: 2019-12-12

## 2019-12-12 NOTE — TELEPHONE ENCOUNTER
Spoke to patient at check out and he wanted to schedule with Dr Ivania Washington and then coordinate appt with our office

## 2019-12-12 NOTE — TELEPHONE ENCOUNTER
----- Message from Mehdi Galaviz MA sent at 12/11/2019  2:14 PM EST -----  Regarding: AWV  Please schedule pt for AWV, was not scheduled at check out when pt  Left today  Thank you!

## 2019-12-13 ENCOUNTER — OFFICE VISIT (OUTPATIENT)
Dept: ENDOCRINOLOGY | Facility: CLINIC | Age: 69
End: 2019-12-13
Payer: COMMERCIAL

## 2019-12-13 VITALS
SYSTOLIC BLOOD PRESSURE: 138 MMHG | BODY MASS INDEX: 36.44 KG/M2 | HEIGHT: 72 IN | HEART RATE: 82 BPM | WEIGHT: 269 LBS | DIASTOLIC BLOOD PRESSURE: 78 MMHG

## 2019-12-13 DIAGNOSIS — E11.40 TYPE 2 DIABETES MELLITUS WITH DIABETIC NEUROPATHY, WITHOUT LONG-TERM CURRENT USE OF INSULIN (HCC): ICD-10-CM

## 2019-12-13 DIAGNOSIS — I10 BENIGN ESSENTIAL HYPERTENSION: ICD-10-CM

## 2019-12-13 DIAGNOSIS — E78.2 COMBINED HYPERLIPIDEMIA: ICD-10-CM

## 2019-12-13 DIAGNOSIS — E11.65 TYPE 2 DIABETES MELLITUS WITH HYPERGLYCEMIA, WITHOUT LONG-TERM CURRENT USE OF INSULIN (HCC): Primary | ICD-10-CM

## 2019-12-13 DIAGNOSIS — G47.33 OBSTRUCTIVE SLEEP APNEA SYNDROME: ICD-10-CM

## 2019-12-13 DIAGNOSIS — Z79.4 TYPE 2 DIABETES MELLITUS WITH COMPLICATION, WITH LONG-TERM CURRENT USE OF INSULIN (HCC): ICD-10-CM

## 2019-12-13 DIAGNOSIS — E11.8 TYPE 2 DIABETES MELLITUS WITH COMPLICATION, WITH LONG-TERM CURRENT USE OF INSULIN (HCC): ICD-10-CM

## 2019-12-13 PROCEDURE — 99214 OFFICE O/P EST MOD 30 MIN: CPT | Performed by: INTERNAL MEDICINE

## 2019-12-13 PROCEDURE — 3078F DIAST BP <80 MM HG: CPT | Performed by: INTERNAL MEDICINE

## 2019-12-13 PROCEDURE — 3075F SYST BP GE 130 - 139MM HG: CPT | Performed by: INTERNAL MEDICINE

## 2019-12-13 NOTE — PROGRESS NOTES
Lucille Santoyo 77 y o  male MRN: [id number]    Encounter: [id number]      Assessment/Plan     Assessment: This is a 77y o -year-old male with diabetes with hyperglycemia  Plan:    Diagnoses and all orders for this visit:    Type 2 diabetes mellitus with hyperglycemia, without long-term current use of insulin ([facility])  A1c were sent to 7 9%  Discussed the option of starting basal insulin or starting another medication, however patient is hesitant to start or add any medication at this time  He stated that he has been noncompliant to diet and is willing to go back on low-carbohydrate diet and see if it helps, as previously his A1c is was always in 6-6 9 range  If A1c does not go down and blood sugars are still elevated he will let us know  Discussed importance of keeping blood sugars in  range and complications of uncontrolled diabetes  -     Hemoglobin A1C; Future  -     Basic metabolic panel; Future    Type 2 diabetes mellitus with complication, with long-term current use of insulin ([facility] )  Discussed importance of dietary modifications  Adviced to see dietitian however patient declined    Type 2 diabetes mellitus with diabetic neuropathy, without long-term current use of insulin ([facility] )  Symptoms are controlled    Combined hyperlipidemia  Continue statins  Educated about dietary modifications, increasing Omega 3 fatty acid inform of food    Benign essential hypertension  BP Readings from Last 3 Encounters:   12/13/19 138/78   12/11/19 144/70   08/05/19 150/60    Blood pressure well controlled  Continue current management  Obstructive sleep apnea syndrome        CC: Diabetes    History of Present Illness     HPI:    Lucille Santoyo is 69-year-old gentleman with past medical history of type 2 diabetes without long-term insulin use is here for follow-up  Complications of diabetes are coronary artery disease neuropathy  He denies recent illnesses or hospitalizations    He denies recent severe hypoglycemic or severe hyperglycemic episodes     He admits noncompliance to diet recently  His fasting blood sugars are elevated in 160-180 range  Is not checking blood sugars in the evening  HIS currently taking metformin 1000 mg twice a day, glimepiride 4 mg twice a day, Victoza 1 8 mg daily  He admits compliance with the medications  He denies any hypoglycemic episode    For hypertension is currently on amlodipine/benazepril 10/20 mg daily  For hyperlipidemia his currently taking Lipitor 40 mg daily    His ophthalmology appointment is up-to-date, no retinopathy  Podiatry appointment is up-to-date    Lab Results   Component Value Date    CREATININE 0 82 12/06/2019         Component      Latest Ref Rng & Units 12/6/2019   Glucose, Random      65 - 99 mg/dL 162 (H)   BUN      7 - 25 mg/dL 22   Creatinine      0 70 - 1 25 mg/dL 0 82   eGFR Non       > OR = 60 mL/min/1 73m2 91   eGFR       > OR = 60 mL/min/1 73m2 105   SL AMB BUN/CREATININE RATIO      6 - 22 (calc) NOT APPLICABLE   Sodium      135 - 146 mmol/L 141   Potassium      3 5 - 5 3 mmol/L 4 1   Chloride      98 - 110 mmol/L 105   CO2      20 - 32 mmol/L 27   SL AMB CALCIUM      8 6 - 10 3 mg/dL 9 4   EXT Creatinine Urine      20 - 320 mg/dL 113   MICROALBUM ,U,RANDOM      See Note: mg/dL 0 8   MICROALBUMIN/CREATININE RATIO      <30 mcg/mg creat 7   Hemoglobin A1C      <5 7 % of total Hgb 7 9 (H)        Review of Systems   Constitutional: Negative for activity change, diaphoresis, fatigue, fever and unexpected weight change  HENT: Negative  Eyes: Negative for visual disturbance  Respiratory: Negative for cough, chest tightness and shortness of breath  Cardiovascular: Negative for chest pain, palpitations and leg swelling  Gastrointestinal: Negative for abdominal pain, blood in stool, constipation, diarrhea, nausea and vomiting  Endocrine: Negative for cold intolerance, heat intolerance, polydipsia, polyphagia and polyuria  Genitourinary: Negative for dysuria, enuresis, frequency and urgency  Musculoskeletal: Negative for arthralgias and myalgias  Skin: Negative for pallor, rash and wound  Allergic/Immunologic: Negative  Neurological: Negative for dizziness, tremors, weakness and numbness  Hematological: Negative  Psychiatric/Behavioral: Negative          Historical Information   Past Medical History:   Diagnosis Date    Cellulitis     last assessed 7/3/13    Diabetes ([facility] )     last assessed 4/10/13    Gastroenteritis     last assessed 3/30/17    High cholesterol     Hypertension     Rhus dermatitis     last assessed 8/26/15    Skin lesion of face     last assessed 3/30/17     Past Surgical History:   Procedure Laterality Date    ROTATOR CUFF REPAIR       Social History   Social History     Substance and Sexual Activity   Alcohol Use Yes    Comment: rare- 1 if out to eat- rarely      Social History     Substance and Sexual Activity   Drug Use No     Social History     Tobacco Use   Smoking Status Former Smoker    Years: 3 00    Types: Cigarettes, Cigars    Last attempt to quit: 1989    Years since quittin 0   Smokeless Tobacco Never Used     Family History:   Family History   Problem Relation Age of Onset    No Known Problems Family     Diabetes unspecified Mother     No Known Problems Father        Meds/Allergies   Current Outpatient Medications   Medication Sig Dispense Refill    amLODIPine-benazepril (LOTREL) 10-20 MG per capsule TAKE 1 CAPSULE BY MOUTH EVERY DAY 90 capsule 1    atorvastatin (LIPITOR) 40 mg tablet TAKE 1 TABLET BY MOUTH EVERY DAY 90 tablet 1    fluticasone (FLONASE) 50 mcg/act nasal spray 2 sprays into each nostril daily 1 Bottle 1    glimepiride (AMARYL) 4 mg tablet TAKE 1 TABLET BY MOUTH TWICE A  tablet 1    MELATONIN PO Take 10-20 mg by mouth daily at bedtime as needed       metFORMIN (GLUCOPHAGE) 1000 MG tablet TAKE 1 TABLET BY MOUTH TWICE A  tablet 1    Naproxen Sodium (ALEVE PO) Take by mouth as needed      NOVOFINE PLUS 32G X 4 MM MISC USE TO INJECT INSULIN DAILY  1    ONETOUCH DELICA LANCETS FINE MISC Inject under the skin 2 (two) times a day 200 each 5    ONETOUCH VERIO test strip 1 each by Other route 2 (two) times a day Use as instructed 200 each 3    VICTOZA injection INJECT 1 8 MG UNDER THE SKIN ONCE DAILY 27 mL 1     No current facility-administered medications for this visit  Allergies   Allergen Reactions    Januvia [Sitagliptin] Other (See Comments)     Unknown reaction, patient can't recall       Objective   Vitals: Blood pressure 138/78, pulse 82, height 6' (1 829 m), weight 122 kg (269 lb)  Physical Exam   Constitutional: He is oriented to person, place, and time  He appears well-developed and well-nourished  No distress  HENT:   Head: Normocephalic and atraumatic  Eyes: Pupils are equal, round, and reactive to light  EOM are normal    Neck: Normal range of motion  Neck supple  No thyromegaly present  Cardiovascular: Normal rate, regular rhythm and normal heart sounds  Pulmonary/Chest: Effort normal and breath sounds normal  No respiratory distress  Musculoskeletal: Normal range of motion  He exhibits no edema or deformity  Neurological: He is alert and oriented to person, place, and time  Skin: Skin is warm and dry  No erythema  Psychiatric: He has a normal mood and affect  Vitals reviewed  The history was obtained from the review of the chart, patient      Lab Results:   Lab Results   Component Value Date/Time    Hemoglobin A1C 7 9 (H) 12/06/2019 07:10 AM    Hemoglobin A1C 7 1 (H) 07/29/2019 07:09 AM    Hemoglobin A1C 6 9 (H) 03/18/2019 07:13 AM    White Blood Cell Count 5 6 08/06/2019 07:38 AM    Hemoglobin 15 6 08/06/2019 07:38 AM    HCT 45 9 08/06/2019 07:38 AM    MCV 92 9 08/06/2019 07:38 AM    Platelet Count 897 54/30/9705 07:38 AM    BUN 22 12/06/2019 07:10 AM    BUN 20 07/29/2019 07:09 AM BUN 16 03/18/2019 07:13 AM    Potassium 4 1 12/06/2019 07:10 AM    Potassium 4 0 07/29/2019 07:09 AM    Potassium 4 0 03/18/2019 07:13 AM    Chloride 105 12/06/2019 07:10 AM    Chloride 103 07/29/2019 07:09 AM    Chloride 105 03/18/2019 07:13 AM    CO2 27 12/06/2019 07:10 AM    CO2 27 07/29/2019 07:09 AM    CO2 24 03/18/2019 07:13 AM    Creatinine 0 82 12/06/2019 07:10 AM    Creatinine 0 91 07/29/2019 07:09 AM    Creatinine 0 91 03/18/2019 07:13 AM    AST 15 07/29/2019 07:09 AM    AST 15 07/29/2019 07:09 AM    ALT 29 07/29/2019 07:09 AM    ALT 29 07/29/2019 07:09 AM    Albumin 4 6 07/29/2019 07:09 AM    Albumin 4 6 07/29/2019 07:09 AM    Globulin 2 7 07/29/2019 07:09 AM    Globulin 2 7 07/29/2019 07:09 AM    HDL 39 (L) 07/29/2019 07:09 AM    HDL 39 (L) 03/18/2019 07:13 AM    Triglycerides 130 07/29/2019 07:09 AM    Triglycerides 86 03/18/2019 07:13 AM           Imaging Studies: I have personally reviewed pertinent reports  Portions of the record may have been created with voice recognition software  Occasional wrong word or "sound a like" substitutions may have occurred due to the inherent limitations of voice recognition software  Read the chart carefully and recognize, using context, where substitutions have occurred

## 2020-01-13 DIAGNOSIS — E11.65 TYPE 2 DIABETES MELLITUS WITH HYPERGLYCEMIA, WITHOUT LONG-TERM CURRENT USE OF INSULIN (HCC): Primary | ICD-10-CM

## 2020-03-05 LAB
LEFT EYE DIABETIC RETINOPATHY: NORMAL
RIGHT EYE DIABETIC RETINOPATHY: NORMAL

## 2020-03-17 LAB
BUN SERPL-MCNC: 18 MG/DL (ref 7–25)
BUN/CREAT SERPL: ABNORMAL (CALC) (ref 6–22)
CALCIUM SERPL-MCNC: 9.2 MG/DL (ref 8.6–10.3)
CHLORIDE SERPL-SCNC: 105 MMOL/L (ref 98–110)
CO2 SERPL-SCNC: 28 MMOL/L (ref 20–32)
CREAT SERPL-MCNC: 0.8 MG/DL (ref 0.7–1.25)
GLUCOSE SERPL-MCNC: 192 MG/DL (ref 65–99)
HBA1C MFR BLD: 8.1 % OF TOTAL HGB
POTASSIUM SERPL-SCNC: 4.1 MMOL/L (ref 3.5–5.3)
SL AMB EGFR AFRICAN AMERICAN: 106 ML/MIN/1.73M2
SL AMB EGFR NON AFRICAN AMERICAN: 92 ML/MIN/1.73M2
SODIUM SERPL-SCNC: 141 MMOL/L (ref 135–146)

## 2020-03-19 ENCOUNTER — TELEPHONE (OUTPATIENT)
Dept: ENDOCRINOLOGY | Facility: CLINIC | Age: 70
End: 2020-03-19

## 2020-03-19 ENCOUNTER — TELEMEDICINE (OUTPATIENT)
Dept: ENDOCRINOLOGY | Facility: CLINIC | Age: 70
End: 2020-03-19
Payer: COMMERCIAL

## 2020-03-19 DIAGNOSIS — E78.2 COMBINED HYPERLIPIDEMIA: ICD-10-CM

## 2020-03-19 DIAGNOSIS — E11.65 TYPE 2 DIABETES MELLITUS WITH HYPERGLYCEMIA, WITHOUT LONG-TERM CURRENT USE OF INSULIN (HCC): Primary | ICD-10-CM

## 2020-03-19 DIAGNOSIS — I10 BENIGN ESSENTIAL HYPERTENSION: ICD-10-CM

## 2020-03-19 PROCEDURE — G2012 BRIEF CHECK IN BY MD/QHP: HCPCS | Performed by: PHYSICIAN ASSISTANT

## 2020-03-19 NOTE — PROGRESS NOTES
Virtual Brief Visit    Reason for visit is type 2 diabetes follow-up  This virtual check-in was done via telephone  Encounter provider Blanca Brothers PA-C    Provider located at [address] [place] [id number]      Recent Visits  No visits were found meeting these conditions  Showing recent visits within past 7 days and meeting all other requirements     Future Appointments  No visits were found meeting these conditions  Showing future appointments within next 150 days and meeting all other requirements        Patient agrees to participate in a virtual check in via telephone or video visit instead of presenting to the office to address urgent/immediate medical needs  Patient is aware this is a billable service  After connecting through telephone, the patient was identified by name and date of birth  Marshall Hill was informed that this was a telemedicine visit and that the exam was being conducted confidentially over secure lines  My office door was closed  No one else was in the room  He acknowledged consent and understanding of privacy and security of the virtual check-in visit  I informed the patient that I have reviewed his record in Epic and presented the opportunity for him to ask any questions regarding the visit today  The patient initiated communication and agreed to participate  Subjective  Marshall Hill is a 77 y o  male here for follow-up of type 2 DM  He does not have history of long term use of insulin  He has complications of CAD and neuropathy  Denies recent illness / hospitalization  He is checking BG levels once a day in the mornings; range 159-221 mg/dl  He is currently taking metformin 1000 mg BID, Victoza 1 8 mg daily, glimepiride 4 mg twice a day  He is not missing doses of medications  He is also taking atorvastatin and Lotrel as instructed by PCP   Denies hypoglycemic episodes and has hypoglycemic awareness  Most recent A1C was elevated at 8 1% GFR normal at 92  His diabetic eye and foot exam are UTD  He has been stressed caring for his mother  He has been "stress eating a lot of candy "      Past Medical History:   Diagnosis Date    Cellulitis     last assessed 7/3/13    Diabetes ([address] )     last assessed 4/10/13    Gastroenteritis     last assessed 3/30/17    High cholesterol     Hypertension     Rhus dermatitis     last assessed 8/26/15    Skin lesion of face     last assessed 3/30/17       Past Surgical History:   Procedure Laterality Date    ROTATOR CUFF REPAIR  2005       Current Outpatient Medications   Medication Sig Dispense Refill    amLODIPine-benazepril (LOTREL) 10-20 MG per capsule TAKE 1 CAPSULE BY MOUTH EVERY DAY 90 capsule 1    atorvastatin (LIPITOR) 40 mg tablet TAKE 1 TABLET BY MOUTH EVERY DAY 90 tablet 1    fluticasone (FLONASE) 50 mcg/act nasal spray 2 sprays into each nostril daily 1 Bottle 1    glimepiride (AMARYL) 4 mg tablet TAKE 1 TABLET BY MOUTH TWICE A  tablet 1    MELATONIN PO Take 10-20 mg by mouth daily at bedtime as needed       metFORMIN (GLUCOPHAGE) 1000 MG tablet TAKE 1 TABLET BY MOUTH TWICE A  tablet 1    Naproxen Sodium (ALEVE PO) Take by mouth as needed      NOVOFINE PLUS 32G X 4 MM MISC Use to inject daily 100 each 1    ONETOUCH DELICA LANCETS FINE MISC Inject under the skin 2 (two) times a day 200 each 5    ONETOUCH VERIO test strip 1 each by Other route 2 (two) times a day Use as instructed 200 each 3    VICTOZA injection INJECT 1 8 MG UNDER THE SKIN ONCE DAILY 27 mL 1     No current facility-administered medications for this visit  Allergies   Allergen Reactions    Januvia [Sitagliptin] Other (See Comments)     Unknown reaction, patient can't recall       Assessment    Brenden Cadena telephone assessment is stable   Disposition:    HGA1C 8 1%  Worsened    Treatment regimen: worsening A1C and BG levels likely due to noncompliance to diet  Recommended he make dietary changes, avoid sweets  Start Jardiance 10 mg daily  Discussed SE, safety/efficacy  Hydrate well  Discussed risks/complications associated with uncontrolled diabetes  Advised to adhere to diabetic diet, and recommended staying active/exercising routinely as toelrated  Keep carbohydrates consistent to limit blood glucose fluctuations  Check blood glucose 2 times a day  Recommended routine follow-up with podiatry and ophthalmology  Send log in 2 weeks  Ordered blood work to complete prior to next visit  I spent 19 minutes with the patient during this virtual check-in visit

## 2020-04-11 DIAGNOSIS — Z79.4 TYPE 2 DIABETES MELLITUS WITH COMPLICATION, WITH LONG-TERM CURRENT USE OF INSULIN (HCC): ICD-10-CM

## 2020-04-11 DIAGNOSIS — E11.8 TYPE 2 DIABETES MELLITUS WITH COMPLICATION, WITH LONG-TERM CURRENT USE OF INSULIN (HCC): ICD-10-CM

## 2020-04-13 DIAGNOSIS — E11.8 TYPE 2 DIABETES MELLITUS WITH COMPLICATION, WITH LONG-TERM CURRENT USE OF INSULIN (HCC): ICD-10-CM

## 2020-04-13 DIAGNOSIS — Z79.4 TYPE 2 DIABETES MELLITUS WITH COMPLICATION, WITH LONG-TERM CURRENT USE OF INSULIN (HCC): ICD-10-CM

## 2020-04-13 DIAGNOSIS — E11.65 TYPE 2 DIABETES MELLITUS WITH HYPERGLYCEMIA, WITHOUT LONG-TERM CURRENT USE OF INSULIN (HCC): ICD-10-CM

## 2020-04-13 RX ORDER — LIRAGLUTIDE 6 MG/ML
INJECTION SUBCUTANEOUS
Qty: 27 PEN | Refills: 1 | OUTPATIENT
Start: 2020-04-13

## 2020-06-01 DIAGNOSIS — E11.8 TYPE 2 DIABETES MELLITUS WITH COMPLICATION (HCC): ICD-10-CM

## 2020-06-02 RX ORDER — GLIMEPIRIDE 4 MG/1
TABLET ORAL
Qty: 180 TABLET | Refills: 1 | Status: SHIPPED | OUTPATIENT
Start: 2020-06-02 | End: 2020-11-24 | Stop reason: SDUPTHER

## 2020-06-24 DIAGNOSIS — I10 ESSENTIAL HYPERTENSION: ICD-10-CM

## 2020-06-24 RX ORDER — AMLODIPINE BESYLATE AND BENAZEPRIL HYDROCHLORIDE 10; 20 MG/1; MG/1
1 CAPSULE ORAL DAILY
Qty: 90 CAPSULE | Refills: 1 | Status: SHIPPED | OUTPATIENT
Start: 2020-06-24 | End: 2020-12-08

## 2020-07-15 DIAGNOSIS — E78.2 MIXED HYPERLIPIDEMIA: ICD-10-CM

## 2020-07-15 RX ORDER — ATORVASTATIN CALCIUM 40 MG/1
40 TABLET, FILM COATED ORAL DAILY
Qty: 90 TABLET | Refills: 1 | Status: SHIPPED | OUTPATIENT
Start: 2020-07-15 | End: 2021-01-15 | Stop reason: SDUPTHER

## 2020-07-15 NOTE — TELEPHONE ENCOUNTER
Patient is calling for refills on the Atorvastatin  40 mg one tablet by mouth once a day    [facility] [address]

## 2020-08-05 ENCOUNTER — OFFICE VISIT (OUTPATIENT)
Dept: INTERNAL MEDICINE CLINIC | Age: 70
End: 2020-08-05
Payer: COMMERCIAL

## 2020-08-05 VITALS
WEIGHT: 261.8 LBS | DIASTOLIC BLOOD PRESSURE: 68 MMHG | BODY MASS INDEX: 34.7 KG/M2 | SYSTOLIC BLOOD PRESSURE: 138 MMHG | HEIGHT: 73 IN | HEART RATE: 85 BPM | OXYGEN SATURATION: 95 % | TEMPERATURE: 98.8 F

## 2020-08-05 DIAGNOSIS — Z00.00 ENCOUNTER FOR MEDICARE ANNUAL WELLNESS EXAM: ICD-10-CM

## 2020-08-05 DIAGNOSIS — E78.2 MIXED HYPERLIPIDEMIA: ICD-10-CM

## 2020-08-05 DIAGNOSIS — Z12.5 PROSTATE CANCER SCREENING: ICD-10-CM

## 2020-08-05 DIAGNOSIS — Z87.891 HISTORY OF TOBACCO USE DISORDER: ICD-10-CM

## 2020-08-05 DIAGNOSIS — E11.65 TYPE 2 DIABETES MELLITUS WITH HYPERGLYCEMIA, WITHOUT LONG-TERM CURRENT USE OF INSULIN (HCC): ICD-10-CM

## 2020-08-05 DIAGNOSIS — I10 ESSENTIAL HYPERTENSION: Primary | ICD-10-CM

## 2020-08-05 PROCEDURE — 1036F TOBACCO NON-USER: CPT | Performed by: PHYSICIAN ASSISTANT

## 2020-08-05 PROCEDURE — 3008F BODY MASS INDEX DOCD: CPT | Performed by: PHYSICIAN ASSISTANT

## 2020-08-05 PROCEDURE — 1125F AMNT PAIN NOTED PAIN PRSNT: CPT | Performed by: PHYSICIAN ASSISTANT

## 2020-08-05 PROCEDURE — 99214 OFFICE O/P EST MOD 30 MIN: CPT | Performed by: PHYSICIAN ASSISTANT

## 2020-08-05 PROCEDURE — 3075F SYST BP GE 130 - 139MM HG: CPT | Performed by: PHYSICIAN ASSISTANT

## 2020-08-05 PROCEDURE — G0438 PPPS, INITIAL VISIT: HCPCS | Performed by: PHYSICIAN ASSISTANT

## 2020-08-05 PROCEDURE — 3078F DIAST BP <80 MM HG: CPT | Performed by: PHYSICIAN ASSISTANT

## 2020-08-05 PROCEDURE — 1160F RVW MEDS BY RX/DR IN RCRD: CPT | Performed by: PHYSICIAN ASSISTANT

## 2020-08-05 PROCEDURE — 1170F FXNL STATUS ASSESSED: CPT | Performed by: PHYSICIAN ASSISTANT

## 2020-08-05 PROCEDURE — 2022F DILAT RTA XM EVC RTNOPTHY: CPT | Performed by: PHYSICIAN ASSISTANT

## 2020-08-05 PROCEDURE — 3725F SCREEN DEPRESSION PERFORMED: CPT | Performed by: PHYSICIAN ASSISTANT

## 2020-08-05 NOTE — PROGRESS NOTES
Assessment and Plan:     Problem List Items Addressed This Visit        Endocrine    Diabetes mellitus ([facility] )    Relevant Orders    CBC and differential    Comprehensive metabolic panel    HEMOGLOBIN A1C W/ EAG ESTIMATION      Other Visit Diagnoses     Essential hypertension    -  Primary    Relevant Orders    CBC and differential    Comprehensive metabolic panel    Lipid panel    HEMOGLOBIN A1C W/ EAG ESTIMATION    Mixed hyperlipidemia        Relevant Orders    CBC and differential    Comprehensive metabolic panel    Lipid panel    HEMOGLOBIN A1C W/ EAG ESTIMATION    Prostate cancer screening        Relevant Orders    PSA, Total Screen    Encounter for Medicare annual wellness exam               Preventive health issues were discussed with patient, and age appropriate screening tests were ordered as noted in patient's After Visit Summary  Personalized health advice and appropriate referrals for health education or preventive services given if needed, as noted in patient's After Visit Summary       History of Present Illness:     Patient presents for Medicare Annual Wellness visit    Patient Care Team:  Adelina Hernandes MD as PCP - General (Internal Medicine)  Adelina Hernandes MD as PCP - [address] [place] [address] (RTE)  Elizabeth Ansari OD (Optometry)  Kaci Pandya MD (Endocrinology)     Problem List:     Patient Active Problem List   Diagnosis    Allergic rhinitis    Anxiety    Basal cell carcinoma of face    Benign essential hypertension    Coronary artery disease with history of myocardial infarction without history of CABG    Combined hyperlipidemia    Diabetes mellitus ([facility]    Welcome to Medicare preventive visit    Elevated bilirubin      Past Medical and Surgical History:     Past Medical History:   Diagnosis Date    Cellulitis     last assessed 7/3/13    Diabetes ([facility] )     last assessed 4/10/13    Gastroenteritis     last assessed 3/30/17    High cholesterol     Hypertension     Rhus dermatitis     last assessed 8/26/15    Skin lesion of face     last assessed 3/30/17     Past Surgical History:   Procedure Laterality Date    ROTATOR CUFF REPAIR        Family History:     Family History   Problem Relation Age of Onset    No Known Problems Family     Diabetes unspecified Mother     No Known Problems Father       Social History:        Social History     Socioeconomic History    Marital status: Single     Spouse name: None    Number of children: None    Years of education: None    Highest education level: None   Occupational History    None   Social Needs    Financial resource strain: None    Food insecurity     Worry: None     Inability: None    Transportation needs     Medical: None     Non-medical: None   Tobacco Use    Smoking status: Former Smoker     Years: 3      Types: Cigarettes, Cigars     Last attempt to quit: 1989     Years since quittin 6    Smokeless tobacco: Never Used   Substance and Sexual Activity    Alcohol use: Yes     Comment: rare- 1 if out to eat- rarely     Drug use: No    Sexual activity: Not Currently   Lifestyle    Physical activity     Days per week: None     Minutes per session: None    Stress: None   Relationships    Social connections     Talks on phone: None     Gets together: None     Attends Zoroastrianism service: None     Active member of club or organization: None     Attends meetings of clubs or organizations: None     Relationship status: None    Intimate partner violence     Fear of current or ex partner: None     Emotionally abused: None     Physically abused: None     Forced sexual activity: None   Other Topics Concern    None   Social History Narrative    None      Medications and Allergies:     Current Outpatient Medications   Medication Sig Dispense Refill    amLODIPine-benazepril (LOTREL) 10-20 MG per capsule Take 1 capsule by mouth daily 90 capsule 1    atorvastatin (LIPITOR) 40 mg tablet Take 1 tablet (40 mg total) by mouth daily 90 tablet 1    Empagliflozin (Jardiance) 10 MG TABS Take 1 tablet (10 mg total) by mouth every morning 30 tablet 5    glimepiride (AMARYL) 4 mg tablet TAKE 1 TABLET BY MOUTH TWICE A  tablet 1    liraglutide (Victoza) injection Inject 1 8mg under skin daily 27 mL 1    MELATONIN PO Take 10-20 mg by mouth daily at bedtime as needed       metFORMIN (GLUCOPHAGE) 1000 MG tablet TAKE 1 TABLET BY MOUTH TWICE A  tablet 1    Naproxen Sodium (ALEVE PO) Take by mouth as needed      NOVOFINE PLUS 32G X 4 MM MISC Use to inject daily 100 each 1    ONETOUCH DELICA LANCETS FINE MISC Inject under the skin 2 (two) times a day 200 each 5    ONETOUCH VERIO test strip 1 each by Other route 2 (two) times a day Use as instructed 200 each 3    fluticasone (FLONASE) 50 mcg/act nasal spray 2 sprays into each nostril daily (Patient not taking: Reported on 8/5/2020) 1 Bottle 1     No current facility-administered medications for this visit  Allergies   Allergen Reactions    Januvia [Sitagliptin] Other (See Comments)     Unknown reaction, patient can't recall      Immunizations: There is no immunization history for the selected administration types on file for this patient  Health Maintenance:         Topic Date Due    Hepatitis C Screening  Completed         Topic Date Due    DTaP,Tdap,and Td Vaccines (1 - Tdap) 07/03/1972    Pneumococcal Vaccine: 65+ Years (1 of 1 - PPSV23) 07/03/2016    Influenza Vaccine  07/01/2020      Medicare Health Risk Assessment:     /68 (BP Location: Left arm, Patient Position: Sitting, Cuff Size: Large)   Pulse 85   Temp 98 8 °F (37 1 °C) (Temporal)   Ht 6' 1"   Wt 119 kg (261 lb 12 8 oz)   SpO2 95%   BMI 34 54 kg/m²      Ar Jara is here for his Subsequent Wellness visit  Health Risk Assessment:   Patient rates overall health as good  Patient feels that their physical health rating is same  Eyesight was rated as slightly worse   Hearing was rated as same  Patient feels that their emotional and mental health rating is slightly worse  Pain experienced in the last 7 days has been none  Patient states that he has experienced no weight loss or gain in last 6 months  Depression Screening:   PHQ-2 Score: 0      Fall Risk Screening: In the past year, patient has experienced: no history of falling in past year      Home Safety:  Patient does not have trouble with stairs inside or outside of their home  Patient has working smoke alarms and has working carbon monoxide detector  Home safety hazards include: none  Nutrition:   Current diet is Diabetic  Medications:   Patient is currently taking over-the-counter supplements  OTC medications include: see medication list  Patient is able to manage medications  Activities of Daily Living (ADLs)/Instrumental Activities of Daily Living (IADLs):   Walk and transfer into and out of bed and chair?: Yes  Dress and groom yourself?: Yes    Bathe or shower yourself?: Yes    Feed yourself?  Yes  Do your laundry/housekeeping?: Yes  Manage your money, pay your bills and track your expenses?: Yes  Make your own meals?: Yes    Do your own shopping?: Yes    Previous Hospitalizations:   Any hospitalizations or ED visits within the last 12 months?: No      Advance Care Planning:   Living will: No    Durable POA for healthcare: No    Advanced directive: No    Five wishes given: No      Cognitive Screening:   Provider or family/friend/caregiver concerned regarding cognition?: No    PREVENTIVE SCREENINGS      Cardiovascular Screening:    General: Screening Not Indicated and History Lipid Disorder      Diabetes Screening:     General: Screening Not Indicated and History Diabetes      Colorectal Cancer Screening:     General: Screening Current      Prostate Cancer Screening:    General: Risks and Benefits Discussed      Osteoporosis Screening:    General: Screening Not Indicated      Abdominal Aortic Aneurysm (AAA) Screening:    Risk factors include: age between 73-69 yo and tobacco use        General: Risks and Benefits Discussed      Lung Cancer Screening:     General: Screening Not Indicated      Hepatitis C Screening:    General: Screening Current      Gustavo Jones PA-C

## 2020-08-05 NOTE — PATIENT INSTRUCTIONS

## 2020-08-05 NOTE — PROGRESS NOTES
Patient's shoes and socks removed  Right Foot/Ankle   Right Foot Inspection  Skin Exam: skin normal, skin intact, dry skin, callus and callus no warmth, no erythema, no maceration, no abnormal color, no pre-ulcer and no ulcer                          Toe Exam: ROM and strength within normal limitsno swelling, no tenderness and erythema  Sensory     Proprioception: intact   Monofilament testing: diminished  Vascular  Capillary refills: < 3 seconds  The right DP pulse is 2+  The right PT pulse is 2+  Left Foot/Ankle  Left Foot Inspection  Skin Exam: skin normal, skin intact, dry skin and callusno warmth, no erythema, no maceration, normal color, no pre-ulcer and no ulcer                         Toe Exam: ROM and strength within normal limitsno swelling, no tenderness and no erythema                   Sensory     Proprioception: intact  Monofilament: diminished  Vascular  Capillary refills: < 3 seconds  The left DP pulse is 2+  The left PT pulse is 2+  Assign Risk Category:  No deformity present; Loss of protective sensation;  No weak pulses       Risk: 1

## 2020-08-05 NOTE — PROGRESS NOTES
Assessment/Plan:         Diagnoses and all orders for this visit:    Essential hypertension  Comments:  avoid salt in diet     Orders:  -     CBC and differential; Future  -     Comprehensive metabolic panel; Future  -     Lipid panel; Future  -     HEMOGLOBIN A1C W/ EAG ESTIMATION; Future  -     US abdominal aorta screening aaa; Future    Mixed hyperlipidemia  Comments:  due for labs   Orders:  -     CBC and differential; Future  -     Comprehensive metabolic panel; Future  -     Lipid panel; Future  -     HEMOGLOBIN A1C W/ EAG ESTIMATION; Future    Type 2 diabetes mellitus with hyperglycemia, without long-term current use of insulin (HCC)  Comments:  followed by endocrine, due for hga1c  discussed diabetic diet   Orders:  -     CBC and differential; Future  -     Comprehensive metabolic panel; Future  -     HEMOGLOBIN A1C W/ EAG ESTIMATION; Future    Prostate cancer screening  -     PSA, Total Screen; Future    Encounter for Medicare annual wellness exam    History of tobacco use disorder  -     US abdominal aorta screening aaa; Future          Subjective:      Patient ID: Sigrid Encarnacion is a 72 y o  male  F/u for htn, dm, dyslipidemia  Pt has been feeling well, he reports a problem with his jardiance, feels that it is causing some more urinary frequency since starting  Pt reports increased anxiety over caring for his mother who is now in a nursing home  He has been using melatonin for sleep which is helpful  Pt denies cp, sob, ab pain, change in appetite       The following portions of the patient's history were reviewed and updated as appropriate: allergies, current medications, past family history, past medical history, past social history, past surgical history and problem list     Review of Systems   Constitutional: Negative for activity change, appetite change, chills, fatigue and fever  HENT: Negative for congestion  Respiratory: Negative for cough, shortness of breath and wheezing  Cardiovascular: Negative for chest pain, palpitations and leg swelling  Gastrointestinal: Negative for abdominal pain, constipation, diarrhea, nausea and vomiting  Musculoskeletal: Positive for arthralgias  Negative for back pain and gait problem  Skin: Negative for rash  Neurological: Negative for dizziness and headaches  Psychiatric/Behavioral: Negative for sleep disturbance  The patient is not nervous/anxious            Past Medical History:   Diagnosis Date    Cellulitis     last assessed 7/3/13    Diabetes [facility])     last assessed 4/10/13    Gastroenteritis     last assessed 3/30/17    High cholesterol     Hypertension     Rhus dermatitis     last assessed 8/26/15    Skin lesion of face     last assessed 3/30/17         Current Outpatient Medications:     amLODIPine-benazepril (LOTREL) 10-20 MG per capsule, Take 1 capsule by mouth daily, Disp: 90 capsule, Rfl: 1    atorvastatin (LIPITOR) 40 mg tablet, Take 1 tablet (40 mg total) by mouth daily, Disp: 90 tablet, Rfl: 1    Empagliflozin (Jardiance) 10 MG TABS, Take 1 tablet (10 mg total) by mouth every morning, Disp: 30 tablet, Rfl: 5    glimepiride (AMARYL) 4 mg tablet, TAKE 1 TABLET BY MOUTH TWICE A DAY, Disp: 180 tablet, Rfl: 1    liraglutide (Victoza) injection, Inject 1 8mg under skin daily, Disp: 27 mL, Rfl: 1    MELATONIN PO, Take 10 mg by mouth daily at bedtime as needed, Disp: , Rfl:     metFORMIN (GLUCOPHAGE) 1000 MG tablet, TAKE 1 TABLET BY MOUTH TWICE A DAY, Disp: 180 tablet, Rfl: 1    Naproxen Sodium (ALEVE PO), Take by mouth as needed, Disp: , Rfl:     NOVOFINE PLUS 32G X 4 MM MISC, Use to inject daily, Disp: 100 each, Rfl: 1    ONETOUCH DELICA LANCETS FINE MISC, Inject under the skin 2 (two) times a day, Disp: 200 each, Rfl: 5    ONETOUCH VERIO test strip, 1 each by Other route 2 (two) times a day Use as instructed, Disp: 200 each, Rfl: 3    Allergies   Allergen Reactions    Januvia [Sitagliptin] Other (See Comments) Unknown reaction, patient can't recall       Social History   Past Surgical History:   Procedure Laterality Date    ROTATOR CUFF REPAIR  2005     Family History   Problem Relation Age of Onset    No Known Problems Family     Diabetes unspecified Mother     No Known Problems Father        Objective:  /68 (BP Location: Left arm, Patient Position: Sitting, Cuff Size: Large)   Pulse 85   Temp 98 8 °F (37 1 °C) (Temporal)   Ht 6' 1"   Wt 119 kg (261 lb 12 8 oz)   SpO2 95%   BMI 34 54 kg/m²        Physical Exam   Constitutional: He is oriented to person, place, and time  He does not appear ill  HENT:   Head: Normocephalic and atraumatic  Right Ear: Tympanic membrane and ear canal normal    Left Ear: Tympanic membrane and ear canal normal    Nose: Nose normal    Mouth/Throat: Mucous membranes are moist    Eyes: Pupils are equal, round, and reactive to light  Conjunctivae are normal    Neck: Normal range of motion  No muscular tenderness present  Cardiovascular: Normal rate, regular rhythm and normal heart sounds  Pulmonary/Chest: Effort normal and breath sounds normal  He has no wheezes  He has no rhonchi  Abdominal: Soft  Normal appearance  He exhibits no distension  There is no abdominal tenderness  +umbilical hernia    Musculoskeletal: Normal range of motion  Right lower leg: No edema  Left lower leg: No edema  Neurological: He is alert and oriented to person, place, and time  Skin: Skin is warm and dry  No rash noted  He is not diaphoretic  No erythema  Psychiatric: His behavior is normal  Mood normal    Vitals reviewed

## 2020-08-06 ENCOUNTER — TELEPHONE (OUTPATIENT)
Dept: ENDOCRINOLOGY | Facility: CLINIC | Age: 70
End: 2020-08-06

## 2020-08-06 NOTE — TELEPHONE ENCOUNTER
Corry Alisa is experiencing urinating 2-3 times an hour, Drinking a lot more fluids and Dizzy and off balance  He would like to stop taking the medicine  He is wondering if he should get an a1c now and keep taking medicine OR Stop medicine and get a1c  The medication he is talking about is the jardiance

## 2020-08-06 NOTE — TELEPHONE ENCOUNTER
We can stop the Jardiance  If his symptoms do not improve after stopping medication he should f/u with his PCP  Advise him to make dietary changes - follow diabetic diet  Continue metformin, glimepiride and Victoza and send BG log in 1-2 weeks       Jackie Flores PA-C

## 2020-08-08 LAB
ALBUMIN SERPL-MCNC: 4.5 G/DL (ref 3.6–5.1)
ALBUMIN/GLOB SERPL: 1.7 (CALC) (ref 1–2.5)
ALP SERPL-CCNC: 57 U/L (ref 35–144)
ALT SERPL-CCNC: 27 U/L (ref 9–46)
AST SERPL-CCNC: 14 U/L (ref 10–35)
BASOPHILS # BLD AUTO: 43 CELLS/UL (ref 0–200)
BASOPHILS NFR BLD AUTO: 0.7 %
BILIRUB SERPL-MCNC: 1.8 MG/DL (ref 0.2–1.2)
BUN SERPL-MCNC: 16 MG/DL (ref 7–25)
BUN/CREAT SERPL: ABNORMAL (CALC) (ref 6–22)
CALCIUM SERPL-MCNC: 9.5 MG/DL (ref 8.6–10.3)
CHLORIDE SERPL-SCNC: 104 MMOL/L (ref 98–110)
CHOLEST SERPL-MCNC: 105 MG/DL
CHOLEST/HDLC SERPL: 2.6 (CALC)
CO2 SERPL-SCNC: 26 MMOL/L (ref 20–32)
CREAT SERPL-MCNC: 0.79 MG/DL (ref 0.7–1.25)
EOSINOPHIL # BLD AUTO: 128 CELLS/UL (ref 15–500)
EOSINOPHIL NFR BLD AUTO: 2.1 %
ERYTHROCYTE [DISTWIDTH] IN BLOOD BY AUTOMATED COUNT: 13.2 % (ref 11–15)
EST. AVERAGE GLUCOSE BLD GHB EST-MCNC: 174 (CALC)
EST. AVERAGE GLUCOSE BLD GHB EST-SCNC: 9.7 (CALC)
GLOBULIN SER CALC-MCNC: 2.7 G/DL (CALC) (ref 1.9–3.7)
GLUCOSE SERPL-MCNC: 180 MG/DL (ref 65–99)
HBA1C MFR BLD: 7.7 % OF TOTAL HGB
HCT VFR BLD AUTO: 53 % (ref 38.5–50)
HDLC SERPL-MCNC: 41 MG/DL
HGB BLD-MCNC: 17.7 G/DL (ref 13.2–17.1)
LDLC SERPL CALC-MCNC: 42 MG/DL (CALC)
LYMPHOCYTES # BLD AUTO: 1671 CELLS/UL (ref 850–3900)
LYMPHOCYTES NFR BLD AUTO: 27.4 %
MCH RBC QN AUTO: 30.8 PG (ref 27–33)
MCHC RBC AUTO-ENTMCNC: 33.4 G/DL (ref 32–36)
MCV RBC AUTO: 92.3 FL (ref 80–100)
MONOCYTES # BLD AUTO: 531 CELLS/UL (ref 200–950)
MONOCYTES NFR BLD AUTO: 8.7 %
NEUTROPHILS # BLD AUTO: 3727 CELLS/UL (ref 1500–7800)
NEUTROPHILS NFR BLD AUTO: 61.1 %
NONHDLC SERPL-MCNC: 64 MG/DL (CALC)
PLATELET # BLD AUTO: 170 THOUSAND/UL (ref 140–400)
PMV BLD REES-ECKER: 9.5 FL (ref 7.5–12.5)
POTASSIUM SERPL-SCNC: 4 MMOL/L (ref 3.5–5.3)
PROT SERPL-MCNC: 7.2 G/DL (ref 6.1–8.1)
PSA SERPL-MCNC: 1.8 NG/ML
RBC # BLD AUTO: 5.74 MILLION/UL (ref 4.2–5.8)
SL AMB EGFR AFRICAN AMERICAN: 106 ML/MIN/1.73M2
SL AMB EGFR NON AFRICAN AMERICAN: 92 ML/MIN/1.73M2
SODIUM SERPL-SCNC: 139 MMOL/L (ref 135–146)
TRIGL SERPL-MCNC: 132 MG/DL
WBC # BLD AUTO: 6.1 THOUSAND/UL (ref 3.8–10.8)

## 2020-08-08 PROCEDURE — 3051F HG A1C>EQUAL 7.0%<8.0%: CPT | Performed by: PHYSICIAN ASSISTANT

## 2020-08-13 DIAGNOSIS — E11.65 TYPE 2 DIABETES MELLITUS WITH HYPERGLYCEMIA, WITHOUT LONG-TERM CURRENT USE OF INSULIN (HCC): ICD-10-CM

## 2020-08-14 ENCOUNTER — TELEPHONE (OUTPATIENT)
Dept: ENDOCRINOLOGY | Facility: CLINIC | Age: 70
End: 2020-08-14

## 2020-08-18 NOTE — PROGRESS NOTES
Virtual Regular Visit      Assessment/Plan:    Problem List Items Addressed This Visit        Endocrine    Diabetes mellitus ([facility] [address] ) - Primary       Cardiovascular and Mediastinum    Benign essential hypertension       Other    Combined hyperlipidemia               Reason for visit is DM  Chief Complaint   Patient presents with    Virtual Regular Visit        Encounter provider Steve Sanders PA-C    Provider located at [address] [address]      Recent Visits  Date Type Provider Dept   08/14/20 Telephone Belen Verde MD [facility]   Showing recent visits within past 7 days and meeting all other requirements     Future Appointments  No visits were found meeting these conditions  Showing future appointments within next 150 days and meeting all other requirements        The patient was identified by name and date of birth  Sanjay Carcamo was informed that this is a telemedicine visit and that the visit is being conducted through telephone  My office door was closed  No one else was in the room  He acknowledged consent and understanding of privacy and security of the video platform  The patient has agreed to participate and understands they can discontinue the visit at any time  Patient is aware this is a billable service  Subjective  Sanjay Carcamo is a 72 y o  male here for follow-up of type 2 DM  He does not have history of long term use of insulin  He has complications of CAD and neuropathy  Denies recent illness / hospitalization  He is checking BG levels 1-2 times a day; range  mg/dl  He is currently taking metformin 1000 mg BID, Victoza 1 8 mg daily, glimepiride 4 mg twice a day  Cristian Candler was stopped due to increased urination and dizziness  Symptoms did improve with stopping medication  He is not missing doses of medications   He is also taking atorvastatin and Lotrel as instructed by PCP  Denies hypoglycemic episodes and has hypoglycemic awareness  Most recent A1C improved to 7 7% GFR normal at 92  His diabetic eye and foot exam are UTD  He did report recent noncompliance with diet which is correlated to his high sugars  He does not when he eats better his numbers as more in the lower-mid 100s range     Component      Latest Ref Rng & Units 8/7/2020 8/7/2020 8/7/2020           7:02 AM  7:02 AM  7:02 AM   Glucose, Random      65 - 99 mg/dL 180 (H)     BUN      7 - 25 mg/dL 16     Creatinine      0 70 - 1 25 mg/dL 0 79     eGFR Non       > OR = 60 mL/min/1 73m2 92     eGFR       > OR = 60 mL/min/1 73m2 106     SL AMB BUN/CREATININE RATIO      6 - 22 (calc) NOT APPLICABLE     Sodium      135 - 146 mmol/L 139     Potassium      3 5 - 5 3 mmol/L 4 0     Chloride      98 - 110 mmol/L 104     CO2      20 - 32 mmol/L 26     Calcium      8 6 - 10 3 mg/dL 9 5     Total Protein      6 1 - 8 1 g/dL 7 2     Albumin      3 6 - 5 1 g/dL 4 5     Globulin      1 9 - 3 7 g/dL (calc) 2 7     Albumin/Globulin Ratio      1 0 - 2 5 (calc) 1 7     TOTAL BILIRUBIN      0 2 - 1 2 mg/dL 1 8 (H)     Alkaline Phosphatase      35 - 144 U/L 57     AST      10 - 35 U/L 14     ALT      9 - 46 U/L 27     Cholesterol      <200 mg/dL 105     HDL      > OR = 40 mg/dL 41     Triglycerides      <150 mg/dL 132     LDL Calculated      mg/dL (calc) 42     Chol HDLC Ratio      <5 0 (calc) 2 6     Non-HDL Cholesterol      <130 mg/dL (calc) 64     Hemoglobin A1C      <5 7 % of total Hgb  7 7 (H)    eAG, EST AVG Glucose      (calc)  174 9 7         Past Medical History:   Diagnosis Date    Cellulitis     last assessed 7/3/13    Diabetes ([facility] [address] )     last assessed 4/10/13    Gastroenteritis     last assessed 3/30/17    High cholesterol     Hypertension     Rhus dermatitis     last assessed 8/26/15    Skin lesion of face     last assessed 3/30/17       Past Surgical History:   Procedure Laterality Date    ROTATOR CUFF REPAIR  2005       Current Outpatient Medications   Medication Sig Dispense Refill    amLODIPine-benazepril (LOTREL) 10-20 MG per capsule Take 1 capsule by mouth daily 90 capsule 1    atorvastatin (LIPITOR) 40 mg tablet Take 1 tablet (40 mg total) by mouth daily 90 tablet 1    glimepiride (AMARYL) 4 mg tablet TAKE 1 TABLET BY MOUTH TWICE A  tablet 1    liraglutide (Victoza) injection Inject 1 8mg under skin daily 27 mL 1    MELATONIN PO Take 10 mg by mouth daily at bedtime as needed      metFORMIN (GLUCOPHAGE) 1000 MG tablet TAKE 1 TABLET BY MOUTH TWICE A  tablet 1    Naproxen Sodium (ALEVE PO) Take by mouth as needed      NovoFine Plus 32G X 4 MM MISC USE TO INJECT DAILY 100 each 1    ONETOUCH DELICA LANCETS FINE MISC Inject under the skin 2 (two) times a day 200 each 5    ONETOUCH VERIO test strip 1 each by Other route 2 (two) times a day Use as instructed 200 each 3     No current facility-administered medications for this visit  Allergies   Allergen Reactions    Januvia [Sitagliptin] Other (See Comments)     Unknown reaction, patient can't recall       Review of Systems   Constitutional: Negative for activity change, appetite change, fatigue and unexpected weight change  HENT: Negative for trouble swallowing  Eyes: Negative for visual disturbance  Respiratory: Negative for shortness of breath  Cardiovascular: Negative for chest pain and palpitations  Gastrointestinal: Negative for constipation and diarrhea  Endocrine: Negative for polydipsia and polyuria  Musculoskeletal: Negative  Skin: Negative for wound  Neurological: Negative for numbness  Psychiatric/Behavioral: Negative  Video Exam    There were no vitals filed for this visit  Physical Exam - no video    PLAN  Type 2 DM  HGA1C 7 7%  Improved  Treatment regimen: continue current treatment   Recommended he make dietary changes, avoid sweets  Discussed risks/complications associated with uncontrolled diabetes  Advised to adhere to diabetic diet, and recommended staying active/exercising routinely as toelrated  Keep carbohydrates consistent to limit blood glucose fluctuations  Check blood glucose 2 times a day  Recommended routine follow-up with podiatry and ophthalmology  Send log in 2 weeks  Ordered blood work to complete prior to next visit  HTN  Continue current treatment  Managed by PCP  Hyperlipidemia  Continue statin therapy  Managed by PCP    I spent 35 minutes directly with the patient during this visit  It was my intent to perform this visit via video technology but the patient was not able to do a video connection so the visit was completed via audio telephone only  Carlton Jeronimo PA-C      VIRTUAL VISIT [address] [facility] Adeline Doyle acknowledges that he has consented to an online visit or consultation  He understands that the online visit is based solely on information provided by him, and that, in the absence of a face-to-face physical evaluation by the physician, the diagnosis he receives is both limited and provisional in terms of accuracy and completeness  This is not intended to replace a full medical face-to-face evaluation by the physician  Emely Aldana understands and accepts these terms

## 2020-08-18 NOTE — PATIENT INSTRUCTIONS
Hypoglycemia instructions   Robert Lloyd  8/18/2020  [id number]    Low Blood Sugar    Steps to treat low blood sugar  1  Test blood sugar if you have symptoms of low blood sugar:   Low Blood Sugar Symptoms:  o Sweaty  o Dizzy  o Rapid heartbeat  o Shaky  o Bad mood  o Hungry      2  Treat blood sugar less than 70 with 15 grams of fast-acting carbohydrate:   Examples of 15 grams Fast-Acting Carbohydrate:  o 4 oz juice  o 4 oz regular soda  o 3-4 glucose tablets (chew)  o 3-4 hard candies (chew)          3  Wait 15 minutes and test your blood sugar again     4   If blood sugar is less than 100, repeat steps 2-3     5  When your blood sugar is 100 or more, eat a snack if it will be longer than one hour until your next meal  The snack should be 15 grams of carbohydrate and a protein:   Examples of snacks:  o ½ sandwich  o 6 crackers with cheese  o Piece of fruit with cheese or peanut butter  o 6 crackers with peanut butter

## 2020-08-20 ENCOUNTER — TELEMEDICINE (OUTPATIENT)
Dept: ENDOCRINOLOGY | Facility: CLINIC | Age: 70
End: 2020-08-20
Payer: COMMERCIAL

## 2020-08-20 DIAGNOSIS — E78.2 COMBINED HYPERLIPIDEMIA: ICD-10-CM

## 2020-08-20 DIAGNOSIS — I10 BENIGN ESSENTIAL HYPERTENSION: ICD-10-CM

## 2020-08-20 DIAGNOSIS — E11.65 TYPE 2 DIABETES MELLITUS WITH HYPERGLYCEMIA, WITHOUT LONG-TERM CURRENT USE OF INSULIN (HCC): Primary | ICD-10-CM

## 2020-08-20 PROCEDURE — 1160F RVW MEDS BY RX/DR IN RCRD: CPT | Performed by: PHYSICIAN ASSISTANT

## 2020-08-20 PROCEDURE — 2022F DILAT RTA XM EVC RTNOPTHY: CPT | Performed by: PHYSICIAN ASSISTANT

## 2020-08-20 PROCEDURE — 3075F SYST BP GE 130 - 139MM HG: CPT | Performed by: PHYSICIAN ASSISTANT

## 2020-08-20 PROCEDURE — 99214 OFFICE O/P EST MOD 30 MIN: CPT | Performed by: PHYSICIAN ASSISTANT

## 2020-08-20 PROCEDURE — 3078F DIAST BP <80 MM HG: CPT | Performed by: PHYSICIAN ASSISTANT

## 2020-08-20 PROCEDURE — 1170F FXNL STATUS ASSESSED: CPT | Performed by: PHYSICIAN ASSISTANT

## 2020-08-20 PROCEDURE — 1036F TOBACCO NON-USER: CPT | Performed by: PHYSICIAN ASSISTANT

## 2020-10-04 DIAGNOSIS — E11.8 TYPE 2 DIABETES MELLITUS WITH COMPLICATION, WITH LONG-TERM CURRENT USE OF INSULIN (HCC): ICD-10-CM

## 2020-10-04 DIAGNOSIS — Z79.4 TYPE 2 DIABETES MELLITUS WITH COMPLICATION, WITH LONG-TERM CURRENT USE OF INSULIN (HCC): ICD-10-CM

## 2020-10-05 DIAGNOSIS — E11.65 TYPE 2 DIABETES MELLITUS WITH HYPERGLYCEMIA, WITHOUT LONG-TERM CURRENT USE OF INSULIN (HCC): Primary | ICD-10-CM

## 2020-10-05 RX ORDER — LIRAGLUTIDE 6 MG/ML
INJECTION SUBCUTANEOUS
Qty: 9 PEN | Refills: 1 | Status: SHIPPED | OUTPATIENT
Start: 2020-10-05 | End: 2021-02-08

## 2020-11-20 DIAGNOSIS — E11.8 TYPE 2 DIABETES MELLITUS WITH COMPLICATION (HCC): ICD-10-CM

## 2020-11-20 LAB
BUN SERPL-MCNC: 16 MG/DL (ref 7–25)
BUN/CREAT SERPL: ABNORMAL (CALC) (ref 6–22)
CALCIUM SERPL-MCNC: 9.3 MG/DL (ref 8.6–10.3)
CHLORIDE SERPL-SCNC: 105 MMOL/L (ref 98–110)
CO2 SERPL-SCNC: 27 MMOL/L (ref 20–32)
CREAT SERPL-MCNC: 0.79 MG/DL (ref 0.7–1.25)
GLUCOSE SERPL-MCNC: 119 MG/DL (ref 65–99)
HBA1C MFR BLD: 6.5 % OF TOTAL HGB
POTASSIUM SERPL-SCNC: 4.1 MMOL/L (ref 3.5–5.3)
SL AMB EGFR AFRICAN AMERICAN: 106 ML/MIN/1.73M2
SL AMB EGFR NON AFRICAN AMERICAN: 92 ML/MIN/1.73M2
SODIUM SERPL-SCNC: 141 MMOL/L (ref 135–146)

## 2020-11-20 PROCEDURE — 3044F HG A1C LEVEL LT 7.0%: CPT | Performed by: PHYSICIAN ASSISTANT

## 2020-11-23 ENCOUNTER — TELEPHONE (OUTPATIENT)
Dept: ENDOCRINOLOGY | Facility: CLINIC | Age: 70
End: 2020-11-23

## 2020-11-23 DIAGNOSIS — E11.8 TYPE 2 DIABETES MELLITUS WITH COMPLICATION, WITH LONG-TERM CURRENT USE OF INSULIN (HCC): ICD-10-CM

## 2020-11-23 DIAGNOSIS — Z79.4 TYPE 2 DIABETES MELLITUS WITH COMPLICATION, WITH LONG-TERM CURRENT USE OF INSULIN (HCC): ICD-10-CM

## 2020-11-23 RX ORDER — LANCETS 30 GAUGE
EACH MISCELLANEOUS
Qty: 200 EACH | Refills: 1 | Status: SHIPPED | OUTPATIENT
Start: 2020-11-23

## 2020-11-24 ENCOUNTER — TELEMEDICINE (OUTPATIENT)
Dept: ENDOCRINOLOGY | Facility: CLINIC | Age: 70
End: 2020-11-24
Payer: COMMERCIAL

## 2020-11-24 DIAGNOSIS — E78.2 COMBINED HYPERLIPIDEMIA: ICD-10-CM

## 2020-11-24 DIAGNOSIS — E11.8 TYPE 2 DIABETES MELLITUS WITH COMPLICATION (HCC): Primary | ICD-10-CM

## 2020-11-24 DIAGNOSIS — I10 BENIGN ESSENTIAL HYPERTENSION: ICD-10-CM

## 2020-11-24 PROCEDURE — 1160F RVW MEDS BY RX/DR IN RCRD: CPT | Performed by: PHYSICIAN ASSISTANT

## 2020-11-24 PROCEDURE — 1036F TOBACCO NON-USER: CPT | Performed by: PHYSICIAN ASSISTANT

## 2020-11-24 PROCEDURE — 99214 OFFICE O/P EST MOD 30 MIN: CPT | Performed by: PHYSICIAN ASSISTANT

## 2020-11-24 RX ORDER — GLIMEPIRIDE 2 MG/1
TABLET ORAL
Qty: 270 TABLET | Refills: 1 | Status: SHIPPED | OUTPATIENT
Start: 2020-11-24 | End: 2021-05-11

## 2020-11-25 RX ORDER — GLIMEPIRIDE 4 MG/1
TABLET ORAL
Qty: 180 TABLET | Refills: 1 | Status: SHIPPED | OUTPATIENT
Start: 2020-11-25 | End: 2020-11-27 | Stop reason: DRUGHIGH

## 2020-11-27 DIAGNOSIS — E11.8 TYPE 2 DIABETES MELLITUS WITH COMPLICATION (HCC): ICD-10-CM

## 2020-11-30 RX ORDER — GLIMEPIRIDE 2 MG/1
TABLET ORAL
Qty: 180 TABLET | Refills: 1 | OUTPATIENT
Start: 2020-11-30

## 2020-12-08 DIAGNOSIS — I10 ESSENTIAL HYPERTENSION: ICD-10-CM

## 2020-12-08 RX ORDER — AMLODIPINE BESYLATE AND BENAZEPRIL HYDROCHLORIDE 10; 20 MG/1; MG/1
CAPSULE ORAL
Qty: 90 CAPSULE | Refills: 1 | Status: SHIPPED | OUTPATIENT
Start: 2020-12-08 | End: 2021-05-19 | Stop reason: SDUPTHER

## 2021-01-07 DIAGNOSIS — E78.2 MIXED HYPERLIPIDEMIA: ICD-10-CM

## 2021-01-07 RX ORDER — ATORVASTATIN CALCIUM 40 MG/1
TABLET, FILM COATED ORAL
Qty: 90 TABLET | Refills: 1 | OUTPATIENT
Start: 2021-01-07

## 2021-01-15 DIAGNOSIS — E78.2 MIXED HYPERLIPIDEMIA: ICD-10-CM

## 2021-01-15 RX ORDER — ATORVASTATIN CALCIUM 40 MG/1
TABLET, FILM COATED ORAL
Qty: 90 TABLET | Refills: 1 | OUTPATIENT
Start: 2021-01-15

## 2021-01-15 NOTE — TELEPHONE ENCOUNTER
Patient states that he needs refills of his Atorvastatin 40mg tablet - please send to [facility] [address]

## 2021-01-18 RX ORDER — ATORVASTATIN CALCIUM 40 MG/1
40 TABLET, FILM COATED ORAL DAILY
Qty: 30 TABLET | Refills: 0 | Status: SHIPPED | OUTPATIENT
Start: 2021-01-18 | End: 2021-02-08 | Stop reason: SDUPTHER

## 2021-01-18 NOTE — TELEPHONE ENCOUNTER
LOV 8/5/20  NOV: will need to call for a f/u   Was supposed to f/u in Nov  Will send a 30 day until appt is made

## 2021-02-07 DIAGNOSIS — E11.65 TYPE 2 DIABETES MELLITUS WITH HYPERGLYCEMIA, WITHOUT LONG-TERM CURRENT USE OF INSULIN (HCC): ICD-10-CM

## 2021-02-07 DIAGNOSIS — Z79.4 TYPE 2 DIABETES MELLITUS WITH COMPLICATION, WITH LONG-TERM CURRENT USE OF INSULIN (HCC): ICD-10-CM

## 2021-02-07 DIAGNOSIS — E11.8 TYPE 2 DIABETES MELLITUS WITH COMPLICATION, WITH LONG-TERM CURRENT USE OF INSULIN (HCC): ICD-10-CM

## 2021-02-08 DIAGNOSIS — E78.2 MIXED HYPERLIPIDEMIA: ICD-10-CM

## 2021-02-08 DIAGNOSIS — E11.65 TYPE 2 DIABETES MELLITUS WITH HYPERGLYCEMIA, WITHOUT LONG-TERM CURRENT USE OF INSULIN (HCC): ICD-10-CM

## 2021-02-08 RX ORDER — LIRAGLUTIDE 6 MG/ML
INJECTION SUBCUTANEOUS
Qty: 10 PEN | Refills: 1 | Status: SHIPPED | OUTPATIENT
Start: 2021-02-08 | End: 2021-08-18

## 2021-02-08 RX ORDER — EMPAGLIFLOZIN 10 MG/1
TABLET, FILM COATED ORAL
Qty: 90 TABLET | Refills: 1 | Status: SHIPPED | OUTPATIENT
Start: 2021-02-08 | End: 2021-08-18

## 2021-02-08 RX ORDER — ATORVASTATIN CALCIUM 40 MG/1
40 TABLET, FILM COATED ORAL DAILY
Qty: 30 TABLET | Refills: 2 | Status: SHIPPED | OUTPATIENT
Start: 2021-02-08 | End: 2021-05-11

## 2021-02-08 NOTE — TELEPHONE ENCOUNTER
Pt called sched 3/24 ,  Says the pcp told him we should order him his atorvastatin 40 mg 1 daily 90 day to [facility] [address]

## 2021-02-15 DIAGNOSIS — E11.65 TYPE 2 DIABETES MELLITUS WITH HYPERGLYCEMIA, WITHOUT LONG-TERM CURRENT USE OF INSULIN (HCC): ICD-10-CM

## 2021-03-18 ENCOUNTER — TELEPHONE (OUTPATIENT)
Dept: ENDOCRINOLOGY | Facility: CLINIC | Age: 71
End: 2021-03-18

## 2021-03-22 LAB
ALBUMIN/CREAT UR: 6 MCG/MG CREAT
BUN SERPL-MCNC: 16 MG/DL (ref 7–25)
BUN/CREAT SERPL: ABNORMAL (CALC) (ref 6–22)
CALCIUM SERPL-MCNC: 9.3 MG/DL (ref 8.6–10.3)
CHLORIDE SERPL-SCNC: 105 MMOL/L (ref 98–110)
CO2 SERPL-SCNC: 29 MMOL/L (ref 20–32)
CREAT SERPL-MCNC: 0.88 MG/DL (ref 0.7–1.25)
CREAT UR-MCNC: 69 MG/DL (ref 20–320)
GLUCOSE SERPL-MCNC: 120 MG/DL (ref 65–99)
HBA1C MFR BLD: 6.8 % OF TOTAL HGB
MICROALBUMIN UR-MCNC: 0.4 MG/DL
POTASSIUM SERPL-SCNC: 4.5 MMOL/L (ref 3.5–5.3)
SL AMB EGFR AFRICAN AMERICAN: 102 ML/MIN/1.73M2
SL AMB EGFR NON AFRICAN AMERICAN: 88 ML/MIN/1.73M2
SODIUM SERPL-SCNC: 142 MMOL/L (ref 135–146)

## 2021-03-22 PROCEDURE — 3061F NEG MICROALBUMINURIA REV: CPT | Performed by: PHYSICIAN ASSISTANT

## 2021-03-22 PROCEDURE — 3044F HG A1C LEVEL LT 7.0%: CPT | Performed by: PHYSICIAN ASSISTANT

## 2021-03-24 ENCOUNTER — OFFICE VISIT (OUTPATIENT)
Dept: ENDOCRINOLOGY | Facility: CLINIC | Age: 71
End: 2021-03-24
Payer: COMMERCIAL

## 2021-03-24 VITALS
TEMPERATURE: 98.5 F | BODY MASS INDEX: 34.06 KG/M2 | SYSTOLIC BLOOD PRESSURE: 150 MMHG | HEART RATE: 72 BPM | WEIGHT: 257 LBS | DIASTOLIC BLOOD PRESSURE: 84 MMHG | HEIGHT: 73 IN

## 2021-03-24 DIAGNOSIS — E11.65 TYPE 2 DIABETES MELLITUS WITH HYPERGLYCEMIA, WITHOUT LONG-TERM CURRENT USE OF INSULIN (HCC): Primary | ICD-10-CM

## 2021-03-24 DIAGNOSIS — I10 BENIGN ESSENTIAL HYPERTENSION: ICD-10-CM

## 2021-03-24 DIAGNOSIS — E78.2 MIXED HYPERLIPIDEMIA: ICD-10-CM

## 2021-03-24 PROCEDURE — 99214 OFFICE O/P EST MOD 30 MIN: CPT | Performed by: PHYSICIAN ASSISTANT

## 2021-03-24 PROCEDURE — 3079F DIAST BP 80-89 MM HG: CPT | Performed by: PHYSICIAN ASSISTANT

## 2021-03-24 PROCEDURE — 1160F RVW MEDS BY RX/DR IN RCRD: CPT | Performed by: PHYSICIAN ASSISTANT

## 2021-03-24 PROCEDURE — 3077F SYST BP >= 140 MM HG: CPT | Performed by: PHYSICIAN ASSISTANT

## 2021-03-24 PROCEDURE — 1036F TOBACCO NON-USER: CPT | Performed by: PHYSICIAN ASSISTANT

## 2021-03-24 PROCEDURE — 3008F BODY MASS INDEX DOCD: CPT | Performed by: PHYSICIAN ASSISTANT

## 2021-03-24 NOTE — PATIENT INSTRUCTIONS
Hypoglycemia instructions   Aiyana Slade  3/24/2021  [id number]    Low Blood Sugar    Steps to treat low blood sugar  1  Test blood sugar if you have symptoms of low blood sugar:   Low Blood Sugar Symptoms:  o Sweaty  o Dizzy  o Rapid heartbeat  o Shaky  o Bad mood  o Hungry      2  Treat blood sugar less than 70 with 15 grams of fast-acting carbohydrate:   Examples of 15 grams Fast-Acting Carbohydrate:  o 4 oz juice  o 4 oz regular soda  o 3-4 glucose tablets (chew)  o 3-4 hard candies (chew)          3  Wait 15 minutes and test your blood sugar again     4   If blood sugar is less than 100, repeat steps 2-3     5  When your blood sugar is 100 or more, eat a snack if it will be longer than one hour until your next meal  The snack should be 15 grams of carbohydrate and a protein:   Examples of snacks:  o ½ sandwich  o 6 crackers with cheese  o Piece of fruit with cheese or peanut butter  o 6 crackers with peanut butter

## 2021-03-24 NOTE — PROGRESS NOTES
Patient Progress Note      CC: DM      Referring Provider  No referring provider defined for this encounter  History of Present Illness:   Henri Patton is a 72 y o  male with a history of type 2 diabetes without long term use of insulin  Diabetes course has been stable  Complications of DM: CAD, neuropathy  Denies recent illness or hospitalizations  Denies recent severe hypoglycemic or severe hyperglycemic episodes  Denies any issues with his current regimen  Home glucose monitoring: are performed regularly     Home blood glucose readings:  mg/dl     Current regimen: metformin 1000 mg BID, glimepiride  2 mg with breakfast and 4 mg with dinner, Victoza 1 8 mg daily, Jardiance 10 mg daily  compliant most of the time, denies any side effects from medications  Injects in: abdomen  Rotates sites: Yes  Hypoglycemic episodes: No, rare  H/o of hypoglycemia causing hospitalization or Intervention such as glucagon injection  or ambulance call :  No  Hypoglycemia symptoms: jitteriness  Treatment of hypoglycemia: candy      Medic alert tag: recommended: Yes     Diet: 3 meals per day, 0-1 snack per day  Timing of meals is predictable  Diabetic diet compliance:  noncompliant some of the time  Activity: Daily activity is predictable: Yes  Takes the dog for a walk weather permitting  Ophthamology: sees routinely, March 2020  Due now  Podiatry: sees routinely, August 2020     Has hypertension: on ACE inhibitor/ARB, compliant most of the time  Has hyperlipidemia: on statin - tolerating well, no myalgias  compliant most of the time, denies any side effects from medications    Thyroid disorders: No  History of pancreatitis: No    Patient Active Problem List   Diagnosis    Allergic rhinitis    Anxiety    Basal cell carcinoma of face    Benign essential hypertension    Coronary artery disease with history of myocardial infarction without history of CABG    Mixed hyperlipidemia    Type 2 diabetes mellitus with hyperglycemia, without long-term current use of insulin ([facility] [address] )    Welcome to Medicare preventive visit    Elevated bilirubin      Past Medical History:   Diagnosis Date    Cellulitis     last assessed 7/3/13    Diabetes ([facility] [address] )     last assessed 4/10/13    Gastroenteritis     last assessed 3/30/17    High cholesterol     Hypertension     Rhus dermatitis     last assessed 8/26/15    Skin lesion of face     last assessed 3/30/17      Past Surgical History:   Procedure Laterality Date    ROTATOR CUFF REPAIR        Family History   Problem Relation Age of Onset    No Known Problems Family     Diabetes unspecified Mother     No Known Problems Father      Social History     Tobacco Use    Smoking status: Former Smoker     Years: 3 00     Types: Cigarettes, Cigars     Quit date: 1989     Years since quittin 3    Smokeless tobacco: Never Used   Substance Use Topics    Alcohol use: Yes     Comment: rare- 1 if out to eat- rarely      Allergies   Allergen Reactions    Januvia [Sitagliptin] Other (See Comments)     Unknown reaction, patient can't recall         Current Outpatient Medications:     amLODIPine-benazepril (LOTREL) 10-20 MG per capsule, TAKE 1 CAPSULE BY MOUTH EVERY DAY, Disp: 90 capsule, Rfl: 1    atorvastatin (LIPITOR) 40 mg tablet, Take 1 tablet (40 mg total) by mouth daily, Disp: 30 tablet, Rfl: 2    Empagliflozin (Jardiance) 10 MG TABS, TAKE 1 TABLET BY MOUTH EVERY DAY, Disp: 90 tablet, Rfl: 1    glimepiride (AMARYL) 2 mg tablet, Take 1 tablet with breakfast and 2 tablets with dinner , Disp: 270 tablet, Rfl: 1    Lancets (OneTouch Delica Plus LETOPZ30W) MISC, Use 2 times a day, Disp: 200 each, Rfl: 1    MELATONIN PO, Take 10 mg by mouth daily at bedtime as needed, Disp: , Rfl:     metFORMIN (GLUCOPHAGE) 1000 MG tablet, TAKE 1 TABLET BY MOUTH TWICE A DAY, Disp: 180 tablet, Rfl: 1    Naproxen Sodium (ALEVE PO), Take by mouth as needed, Disp: , Rfl:     NovoFine Plus 32G X 4 MM MISC, Inject under the skin daily, Disp: 100 each, Rfl: 1    ONETOUCH VERIO test strip, 1 each by Other route 2 (two) times a day Use as instructed, Disp: 200 each, Rfl: 3    Victoza injection, INJECT 1 8 MG UNDER THE SKIN ONCE DAILY, Disp: 10 pen, Rfl: 1  Review of Systems   Constitutional: Negative for activity change, appetite change, fatigue and unexpected weight change  HENT: Negative for trouble swallowing  Eyes: Positive for visual disturbance (cataract)  Respiratory: Negative for shortness of breath  Cardiovascular: Negative for chest pain and palpitations  Gastrointestinal: Negative for constipation and diarrhea  Endocrine: Negative for polydipsia and polyuria  Musculoskeletal: Negative  Skin: Negative for wound  Neurological: Positive for numbness  Psychiatric/Behavioral: Negative  Physical Exam:  Body mass index is 33 91 kg/m²  /84   Pulse 72   Temp 98 5 °F (36 9 °C) (Tympanic)   Ht 6' 1" (1 854 m)   Wt 117 kg (257 lb)   BMI 33 91 kg/m²    Wt Readings from Last 3 Encounters:   03/24/21 117 kg (257 lb)   08/05/20 119 kg (261 lb 12 8 oz)   12/13/19 122 kg (269 lb)       Physical Exam  Vitals signs and nursing note reviewed  Constitutional:       Appearance: He is well-developed  HENT:      Head: Normocephalic  Eyes:      General: No scleral icterus  Pupils: Pupils are equal, round, and reactive to light  Neck:      Musculoskeletal: Neck supple  Thyroid: No thyromegaly  Cardiovascular:      Rate and Rhythm: Normal rate and regular rhythm  Pulses:           Radial pulses are 2+ on the right side and 2+ on the left side  Heart sounds: No murmur  Pulmonary:      Effort: Pulmonary effort is normal  No respiratory distress  Breath sounds: Normal breath sounds  No wheezing  Skin:     General: Skin is warm and dry  Neurological:      Mental Status: He is alert         Diabetic Foot Exam    Labs:   Component      Latest Ref Rng & Units 11/19/2020 3/19/2021   Glucose, Random      65 - 99 mg/dL 119 (H) 120 (H)   BUN      7 - 25 mg/dL 16 16   Creatinine      0 70 - 1 25 mg/dL 0 79 0 88   eGFR Non       > OR = 60 mL/min/1 73m2 92 88   eGFR       > OR = 60 mL/min/1 73m2 106 102   SL AMB BUN/CREATININE RATIO      6 - 22 (calc) NOT APPLICABLE NOT APPLICABLE   Sodium      135 - 146 mmol/L 141 142   Potassium      3 5 - 5 3 mmol/L 4 1 4 5   Chloride      98 - 110 mmol/L 105 105   CO2      20 - 32 mmol/L 27 29   Calcium      8 6 - 10 3 mg/dL 9 3 9 3   EXT Creatinine Urine      20 - 320 mg/dL  69   MICROALBUM ,U,RANDOM      See Note: mg/dL  0 4   MICROALBUMIN/CREATININE RATIO      <30 mcg/mg creat  6   Hemoglobin A1C      <5 7 % of total Hgb 6 5 (H) 6 8 (H)         Plan:    Diagnoses and all orders for this visit:    Type 2 diabetes mellitus with hyperglycemia, without long-term current use of insulin ([facility])  HGA1C 6 8%  Worsened  Treatment regimen: continue current treatment  Discussed intensive insulin regimen does increase risk for hypoglycemia  Episodes of hypoglycemia can lead to permanent disability and death  Discussed risks/complications associated with uncontrolled diabetes  Advised to adhere to diabetic diet, and recommended staying active/exercising routinely  Keep carbohydrates consistent to limit blood glucose fluctuations  Advised to call if blood sugars less than 70 mg/dl or over 300 mg/dl  Check blood glucose 1-2 times a day  Discussed symptoms and treatment of hypoglycemia  Recommended routine follow-up with podiatry and ophthalmology  Ordered blood work to complete prior to next visit  -     Hemoglobin A1C; Future  -     Basic metabolic panel;  Future    Mixed hyperlipidemia  Continue statin therapy     Benign essential hypertension  Blood pressure elevated  Advised to monitor BP at home and follow-up with PCP if remaining elevated  For now continue current treatment   -     Basic metabolic panel; Future       Reviewed labs that are included above  Discussed with the patient diagnosis and treatment and all questions fully answered  He will call me if any problems arise  Counseled patient on diagnostic results, prognosis, risk and benefit of treatment options, instruction for management, importance of treatment compliance, risk factor reduction and impressions        Jackie Flores PA-C

## 2021-04-28 ENCOUNTER — IMMUNIZATIONS (OUTPATIENT)
Dept: FAMILY MEDICINE CLINIC | Facility: HOSPITAL | Age: 71
End: 2021-04-28

## 2021-04-28 DIAGNOSIS — Z23 ENCOUNTER FOR IMMUNIZATION: Primary | ICD-10-CM

## 2021-04-28 PROCEDURE — 0001A SARS-COV-2 / COVID-19 MRNA VACCINE (PFIZER-BIONTECH) 30 MCG: CPT

## 2021-04-28 PROCEDURE — 91300 SARS-COV-2 / COVID-19 MRNA VACCINE (PFIZER-BIONTECH) 30 MCG: CPT

## 2021-05-04 ENCOUNTER — TELEPHONE (OUTPATIENT)
Dept: INTERNAL MEDICINE CLINIC | Facility: CLINIC | Age: 71
End: 2021-05-04

## 2021-05-11 DIAGNOSIS — E78.2 MIXED HYPERLIPIDEMIA: ICD-10-CM

## 2021-05-11 DIAGNOSIS — E11.8 TYPE 2 DIABETES MELLITUS WITH COMPLICATION (HCC): ICD-10-CM

## 2021-05-11 RX ORDER — ATORVASTATIN CALCIUM 40 MG/1
TABLET, FILM COATED ORAL
Qty: 90 TABLET | Refills: 1 | Status: SHIPPED | OUTPATIENT
Start: 2021-05-11 | End: 2021-11-22

## 2021-05-11 RX ORDER — GLIMEPIRIDE 2 MG/1
TABLET ORAL
Qty: 270 TABLET | Refills: 1 | Status: SHIPPED | OUTPATIENT
Start: 2021-05-11 | End: 2021-11-22

## 2021-05-19 ENCOUNTER — IMMUNIZATIONS (OUTPATIENT)
Dept: FAMILY MEDICINE CLINIC | Facility: HOSPITAL | Age: 71
End: 2021-05-19

## 2021-05-19 DIAGNOSIS — Z23 ENCOUNTER FOR IMMUNIZATION: Primary | ICD-10-CM

## 2021-05-19 DIAGNOSIS — I10 ESSENTIAL HYPERTENSION: ICD-10-CM

## 2021-05-19 PROCEDURE — 0002A SARS-COV-2 / COVID-19 MRNA VACCINE (PFIZER-BIONTECH) 30 MCG: CPT

## 2021-05-19 PROCEDURE — 91300 SARS-COV-2 / COVID-19 MRNA VACCINE (PFIZER-BIONTECH) 30 MCG: CPT

## 2021-05-19 RX ORDER — AMLODIPINE BESYLATE AND BENAZEPRIL HYDROCHLORIDE 10; 20 MG/1; MG/1
1 CAPSULE ORAL DAILY
Qty: 90 CAPSULE | Refills: 1 | Status: SHIPPED | OUTPATIENT
Start: 2021-05-19 | End: 2021-12-06 | Stop reason: SDUPTHER

## 2021-05-21 ENCOUNTER — RA CDI HCC (OUTPATIENT)
Dept: OTHER | Facility: HOSPITAL | Age: 71
End: 2021-05-21

## 2021-05-22 NOTE — PROGRESS NOTES
[facility]  coding opportunities          Chart reviewed, no opportunity found: CHART REVIEWED, NO OPPORTUNITY FOUND              Patients insurance company: [employer]

## 2021-05-26 ENCOUNTER — OFFICE VISIT (OUTPATIENT)
Dept: INTERNAL MEDICINE CLINIC | Age: 71
End: 2021-05-26
Payer: COMMERCIAL

## 2021-05-26 VITALS
DIASTOLIC BLOOD PRESSURE: 60 MMHG | OXYGEN SATURATION: 97 % | WEIGHT: 256 LBS | HEART RATE: 81 BPM | SYSTOLIC BLOOD PRESSURE: 128 MMHG | HEIGHT: 73 IN | BODY MASS INDEX: 33.93 KG/M2 | TEMPERATURE: 98.4 F

## 2021-05-26 DIAGNOSIS — I10 BENIGN ESSENTIAL HYPERTENSION: ICD-10-CM

## 2021-05-26 DIAGNOSIS — I25.10 CORONARY ARTERY DISEASE WITH HISTORY OF MYOCARDIAL INFARCTION WITHOUT HISTORY OF CABG: ICD-10-CM

## 2021-05-26 DIAGNOSIS — I25.2 CORONARY ARTERY DISEASE WITH HISTORY OF MYOCARDIAL INFARCTION WITHOUT HISTORY OF CABG: ICD-10-CM

## 2021-05-26 DIAGNOSIS — E11.65 TYPE 2 DIABETES MELLITUS WITH HYPERGLYCEMIA, WITHOUT LONG-TERM CURRENT USE OF INSULIN (HCC): ICD-10-CM

## 2021-05-26 DIAGNOSIS — G25.81 RLS (RESTLESS LEGS SYNDROME): Primary | ICD-10-CM

## 2021-05-26 PROCEDURE — 3078F DIAST BP <80 MM HG: CPT | Performed by: PHYSICIAN ASSISTANT

## 2021-05-26 PROCEDURE — 3008F BODY MASS INDEX DOCD: CPT | Performed by: PHYSICIAN ASSISTANT

## 2021-05-26 PROCEDURE — 3725F SCREEN DEPRESSION PERFORMED: CPT | Performed by: PHYSICIAN ASSISTANT

## 2021-05-26 PROCEDURE — 1101F PT FALLS ASSESS-DOCD LE1/YR: CPT | Performed by: PHYSICIAN ASSISTANT

## 2021-05-26 PROCEDURE — 99213 OFFICE O/P EST LOW 20 MIN: CPT | Performed by: PHYSICIAN ASSISTANT

## 2021-05-26 PROCEDURE — 3074F SYST BP LT 130 MM HG: CPT | Performed by: PHYSICIAN ASSISTANT

## 2021-05-26 PROCEDURE — 3288F FALL RISK ASSESSMENT DOCD: CPT | Performed by: PHYSICIAN ASSISTANT

## 2021-05-26 PROCEDURE — 1160F RVW MEDS BY RX/DR IN RCRD: CPT | Performed by: PHYSICIAN ASSISTANT

## 2021-05-26 PROCEDURE — 1036F TOBACCO NON-USER: CPT | Performed by: PHYSICIAN ASSISTANT

## 2021-05-26 RX ORDER — ROPINIROLE 0.5 MG/1
0.5 TABLET, FILM COATED ORAL
Qty: 30 TABLET | Refills: 2 | Status: SHIPPED | OUTPATIENT
Start: 2021-05-26 | End: 2022-06-07 | Stop reason: ALTCHOICE

## 2021-05-26 NOTE — PROGRESS NOTES
Assessment/Plan:         Diagnoses and all orders for this visit:    RLS (restless legs syndrome)  Comments:  trial requip qhs   Orders:  -     rOPINIRole (REQUIP) 0 5 mg tablet; Take 1 tablet (0 5 mg total) by mouth daily at bedtime    Benign essential hypertension  Comments:  improved   avoid salt in diet     Coronary artery disease with history of myocardial infarction without history of CABG    Type 2 diabetes mellitus with hyperglycemia, without long-term current use of insulin (HCC)  Comments:  continue to follow with endocrine  hga1c stable         BMI Counseling: Body mass index is 33 78 kg/m²  The BMI is above normal  Nutrition recommendations include decreasing portion sizes, encouraging healthy choices of fruits and vegetables, decreasing fast food intake and limiting drinks that contain sugar  Exercise recommendations include exercising 3-5 times per week  pt declines pneumovax     Subjective:      Patient ID: Krystal Michelle is a 72 y o  male  71 y/o male with hx of DM, HTN, CAD, dyslipidemia   Last hga1c 6 8 (3/21) and follows with endocrine   Pt continues on jardiance although he notes it causes urinary frequency   Pt reports he is now retired    Pt continues to be active but denies daily aerobic exercise     Pt c/o restless sensation at night in legs  Pt notes it has been worse over the past 3-4 months   Pt states he has to constantly move his legs during the night   Reports it seems to affect his sleep       The following portions of the patient's history were reviewed and updated as appropriate: allergies, current medications, past family history, past medical history, past social history, past surgical history and problem list     Review of Systems   Constitutional: Negative for appetite change, chills, fatigue and fever  HENT: Negative for congestion and sore throat  Eyes: Negative for redness and visual disturbance     Respiratory: Negative for cough, shortness of breath and wheezing  Cardiovascular: Negative for chest pain and leg swelling  Gastrointestinal: Negative for abdominal pain, constipation, diarrhea and nausea  Genitourinary: Negative for dysuria and frequency  Musculoskeletal: Negative for arthralgias, back pain and gait problem  Skin: Negative for rash  Neurological: Negative for dizziness, light-headedness and headaches  Psychiatric/Behavioral: Negative for sleep disturbance  The patient is not nervous/anxious            Past Medical History:   Diagnosis Date    Cellulitis     last assessed 7/3/13    Diabetes [facility])     last assessed 4/10/13    Gastroenteritis     last assessed 3/30/17    High cholesterol     Hypertension     Rhus dermatitis     last assessed 8/26/15    Skin lesion of face     last assessed 3/30/17         Current Outpatient Medications:     amLODIPine-benazepril (LOTREL) 10-20 MG per capsule, Take 1 capsule by mouth daily, Disp: 90 capsule, Rfl: 1    atorvastatin (LIPITOR) 40 mg tablet, TAKE 1 TABLET BY MOUTH EVERY DAY, Disp: 90 tablet, Rfl: 1    Empagliflozin (Jardiance) 10 MG TABS, TAKE 1 TABLET BY MOUTH EVERY DAY, Disp: 90 tablet, Rfl: 1    glimepiride (AMARYL) 2 mg tablet, TAKE 1 TABLET BY MOUTH WITH BREAKFAST AND 2 TABLETS WITH DINNER, Disp: 270 tablet, Rfl: 1    Lancets (OneTouch Delica Plus [id number]) MISC, Use 2 times a day, Disp: 200 each, Rfl: 1    MELATONIN PO, Take 10 mg by mouth daily at bedtime as needed, Disp: , Rfl:     metFORMIN (GLUCOPHAGE) 1000 MG tablet, TAKE 1 TABLET BY MOUTH TWICE A DAY, Disp: 180 tablet, Rfl: 1    Naproxen Sodium (ALEVE PO), Take by mouth as needed, Disp: , Rfl:     NovoFine Plus 32G X 4 MM MISC, Inject under the skin daily, Disp: 100 each, Rfl: 1    ONETOUCH VERIO test strip, 1 each by Other route 2 (two) times a day Use as instructed, Disp: 200 each, Rfl: 3    Victoza injection, INJECT 1 8 MG UNDER THE SKIN ONCE DAILY, Disp: 10 pen, Rfl: 1    rOPINIRole (REQUIP) 0 5 mg tablet, Take 1 tablet (0 5 mg total) by mouth daily at bedtime, Disp: 30 tablet, Rfl: 2    Allergies   Allergen Reactions    Januvia [Sitagliptin] Other (See Comments)     Unknown reaction, patient can't recall       Social History   Past Surgical History:   Procedure Laterality Date    ROTATOR CUFF REPAIR  2005     Family History   Problem Relation Age of Onset    No Known Problems Family     Diabetes unspecified Mother     No Known Problems Father        Objective:  /60 (BP Location: Left arm, Patient Position: Sitting, Cuff Size: Standard)   Pulse 81   Temp 98 4 °F (36 9 °C) (Temporal)   Ht 6' 1" (1 854 m)   Wt 116 kg (256 lb)   SpO2 97%   BMI 33 78 kg/m²        Physical Exam  Vitals signs reviewed  Constitutional:       General: He is not in acute distress  HENT:      Head: Normocephalic  Right Ear: Tympanic membrane, ear canal and external ear normal       Left Ear: Tympanic membrane, ear canal and external ear normal       Nose: Nose normal    Eyes:      General:         Right eye: No discharge  Left eye: No discharge  Extraocular Movements: Extraocular movements intact  Conjunctiva/sclera: Conjunctivae normal       Pupils: Pupils are equal, round, and reactive to light  Cardiovascular:      Rate and Rhythm: Normal rate and regular rhythm  Pulmonary:      Effort: Pulmonary effort is normal       Breath sounds: Normal breath sounds  No wheezing, rhonchi or rales  Musculoskeletal: Normal range of motion  Right lower leg: No edema  Left lower leg: No edema  Skin:     General: Skin is warm  Findings: No erythema or rash  Neurological:      General: No focal deficit present  Mental Status: He is alert and oriented to person, place, and time     Psychiatric:         Mood and Affect: Mood normal

## 2021-05-26 NOTE — PROGRESS NOTES
Diabetic Foot Exam    Patient's shoes and socks removed  Right Foot/Ankle   Right Foot Inspection  Skin Exam: skin normal, skin intact and dry skin no warmth, no callus, no erythema, no maceration, no abnormal color, no pre-ulcer, no ulcer and no callus                          Toe Exam: ROM and strength within normal limitsno swelling and erythema  Sensory   Vibration: intact    Monofilament testing: intact  Vascular  Capillary refills: < 3 seconds  The right DP pulse is 1+  The right PT pulse is 1+  Left Foot/Ankle  Left Foot Inspection  Skin Exam: skin normal, skin intact and dry skinno warmth, no erythema, no maceration, normal color, no pre-ulcer, no ulcer and no callus                         Toe Exam: ROM and strength within normal limitsno swelling and no erythema                   Sensory   Vibration: intact    Monofilament: intact  Vascular  Capillary refills: < 3 seconds  The left DP pulse is 1+  The left PT pulse is 1+  Assign Risk Category:  Deformity present; Loss of protective sensation;  No weak pulses       Risk: 1

## 2021-07-27 LAB
BUN SERPL-MCNC: 25 MG/DL (ref 7–25)
BUN/CREAT SERPL: ABNORMAL (CALC) (ref 6–22)
CALCIUM SERPL-MCNC: 9.2 MG/DL (ref 8.6–10.3)
CHLORIDE SERPL-SCNC: 104 MMOL/L (ref 98–110)
CO2 SERPL-SCNC: 25 MMOL/L (ref 20–32)
CREAT SERPL-MCNC: 0.85 MG/DL (ref 0.7–1.18)
GLUCOSE SERPL-MCNC: 163 MG/DL (ref 65–99)
HBA1C MFR BLD: 6.8 % OF TOTAL HGB
POTASSIUM SERPL-SCNC: 4.6 MMOL/L (ref 3.5–5.3)
SL AMB EGFR AFRICAN AMERICAN: 102 ML/MIN/1.73M2
SL AMB EGFR NON AFRICAN AMERICAN: 88 ML/MIN/1.73M2
SODIUM SERPL-SCNC: 138 MMOL/L (ref 135–146)

## 2021-07-27 PROCEDURE — 3044F HG A1C LEVEL LT 7.0%: CPT | Performed by: PHYSICIAN ASSISTANT

## 2021-07-29 ENCOUNTER — OFFICE VISIT (OUTPATIENT)
Dept: ENDOCRINOLOGY | Facility: CLINIC | Age: 71
End: 2021-07-29
Payer: COMMERCIAL

## 2021-07-29 VITALS
TEMPERATURE: 98.7 F | HEART RATE: 88 BPM | WEIGHT: 254 LBS | SYSTOLIC BLOOD PRESSURE: 152 MMHG | DIASTOLIC BLOOD PRESSURE: 90 MMHG | BODY MASS INDEX: 33.51 KG/M2

## 2021-07-29 DIAGNOSIS — E11.65 TYPE 2 DIABETES MELLITUS WITH HYPERGLYCEMIA, WITHOUT LONG-TERM CURRENT USE OF INSULIN (HCC): Primary | ICD-10-CM

## 2021-07-29 DIAGNOSIS — I10 BENIGN ESSENTIAL HYPERTENSION: ICD-10-CM

## 2021-07-29 DIAGNOSIS — E78.2 MIXED HYPERLIPIDEMIA: ICD-10-CM

## 2021-07-29 PROCEDURE — 3080F DIAST BP >= 90 MM HG: CPT | Performed by: PHYSICIAN ASSISTANT

## 2021-07-29 PROCEDURE — 99214 OFFICE O/P EST MOD 30 MIN: CPT | Performed by: PHYSICIAN ASSISTANT

## 2021-07-29 PROCEDURE — 1160F RVW MEDS BY RX/DR IN RCRD: CPT | Performed by: PHYSICIAN ASSISTANT

## 2021-07-29 PROCEDURE — 3077F SYST BP >= 140 MM HG: CPT | Performed by: PHYSICIAN ASSISTANT

## 2021-07-29 PROCEDURE — 1036F TOBACCO NON-USER: CPT | Performed by: PHYSICIAN ASSISTANT

## 2021-07-29 NOTE — PROGRESS NOTES
Patient Progress Note      CC: DM      Referring Provider  Md Sergey Klein [address] [place] [address] [address]     History of Present Illness:   Aki Rio Oso is a 80 y o  male with a history of type 2 diabetes without long term use of insulin  Diabetes course has been stable  Complications of DM: CAD, neuropathy  Denies recent illness or hospitalizations  Denies recent severe hypoglycemic or severe hyperglycemic episodes  Denies any issues with his current regimen  Home glucose monitoring: are performed sporadically     Home blood glucose readings: 122-160 mg/dl     Current regimen: metformin 1000 mg BID, glimepiride  2 mg with breakfast and 4 mg with dinner, Victoza 1 8 mg daily, Jardiance 10 mg daily  compliant most of the time, denies any side effects from medications  Injects in: abdomen  Rotates sites: Yes  Hypoglycemic episodes: No, rare  H/o of hypoglycemia causing hospitalization or Intervention such as glucagon injection  or ambulance call :  No  Hypoglycemia symptoms: jitteriness  Treatment of hypoglycemia: candy      Medic alert tag: recommended: Yes     Diet: 3 meals per day, 1-2 snacks per day  Timing of meals is predictable  Diabetic diet compliance:  noncompliant some of the time  Activity: Daily activity is predictable: Yes  No routine exercise but does do a lot of work around the house  Ophthamology: sees routinely, March 2020  Needs to schedule an appointment  Podiatry: sees routinely, May 2021     Has hypertension: on ACE inhibitor/ARB, compliant most of the time  Has hyperlipidemia: on statin - tolerating well, no myalgias  compliant most of the time, denies any side effects from medications    Thyroid disorders: No  History of pancreatitis: No    Patient Active Problem List   Diagnosis    Allergic rhinitis    Anxiety    Basal cell carcinoma of face    Benign essential hypertension    Coronary artery disease with history of myocardial infarction without history of CABG    Mixed hyperlipidemia    Type 2 diabetes mellitus with hyperglycemia, without long-term current use of insulin ([facility] )    Welcome to Medicare preventive visit    Elevated bilirubin      Past Medical History:   Diagnosis Date    Cellulitis     last assessed 7/3/13    Diabetes ([facility] )     last assessed 4/10/13    Gastroenteritis     last assessed 3/30/17    High cholesterol     Hypertension     Rhus dermatitis     last assessed 8/26/15    Skin lesion of face     last assessed 3/30/17      Past Surgical History:   Procedure Laterality Date    ROTATOR CUFF REPAIR        Family History   Problem Relation Age of Onset    No Known Problems Family     Diabetes unspecified Mother     No Known Problems Father      Social History     Tobacco Use    Smoking status: Former Smoker     Years: 3 00     Types: Cigarettes, Cigars     Quit date: 1989     Years since quittin 6    Smokeless tobacco: Never Used   Substance Use Topics    Alcohol use: Yes     Comment: rare- 1 if out to eat- rarely      Allergies   Allergen Reactions    Januvia [Sitagliptin] Other (See Comments)     Unknown reaction, patient can't recall         Current Outpatient Medications:     amLODIPine-benazepril (LOTREL) 10-20 MG per capsule, Take 1 capsule by mouth daily, Disp: 90 capsule, Rfl: 1    atorvastatin (LIPITOR) 40 mg tablet, TAKE 1 TABLET BY MOUTH EVERY DAY, Disp: 90 tablet, Rfl: 1    Empagliflozin (Jardiance) 10 MG TABS, TAKE 1 TABLET BY MOUTH EVERY DAY, Disp: 90 tablet, Rfl: 1    glimepiride (AMARYL) 2 mg tablet, TAKE 1 TABLET BY MOUTH WITH BREAKFAST AND 2 TABLETS WITH DINNER, Disp: 270 tablet, Rfl: 1    Lancets (OneTouch Delica Plus [id number]) MISC, Use 2 times a day, Disp: 200 each, Rfl: 1    MELATONIN PO, Take 10 mg by mouth daily at bedtime as needed, Disp: , Rfl:     metFORMIN (GLUCOPHAGE) 1000 MG tablet, TAKE 1 TABLET BY MOUTH TWICE A DAY, Disp: 180 tablet, Rfl: 1    Naproxen Sodium (ALEVE PO), Take by mouth as needed, Disp: , Rfl:     NovoFine Plus 32G X 4 MM MISC, Inject under the skin daily, Disp: 100 each, Rfl: 1    ONETOUCH VERIO test strip, 1 each by Other route 2 (two) times a day Use as instructed, Disp: 200 each, Rfl: 3    rOPINIRole (REQUIP) 0 5 mg tablet, Take 1 tablet (0 5 mg total) by mouth daily at bedtime, Disp: 30 tablet, Rfl: 2    Victoza injection, INJECT 1 8 MG UNDER THE SKIN ONCE DAILY, Disp: 10 pen, Rfl: 1  Review of Systems   Constitutional: Negative for activity change, appetite change, fatigue and unexpected weight change  HENT: Negative for trouble swallowing  Eyes: Positive for visual disturbance (needs new glasses)  Respiratory: Negative for shortness of breath  Cardiovascular: Negative for chest pain and palpitations  Gastrointestinal: Negative for constipation and diarrhea  Endocrine: Negative for polydipsia and polyuria  Musculoskeletal: Negative  Skin: Negative for wound  Neurological: Positive for numbness (mostly at night)  Psychiatric/Behavioral: Negative  Physical Exam:  Body mass index is 33 51 kg/m²  /90   Pulse 88   Temp 98 7 °F (37 1 °C)   Wt 115 kg (254 lb)   BMI 33 51 kg/m²    Wt Readings from Last 3 Encounters:   07/29/21 115 kg (254 lb)   05/26/21 116 kg (256 lb)   03/24/21 117 kg (257 lb)       Physical Exam  Vitals and nursing note reviewed  Constitutional:       Appearance: He is well-developed  HENT:      Head: Normocephalic  Eyes:      General: No scleral icterus  Pupils: Pupils are equal, round, and reactive to light  Neck:      Thyroid: No thyromegaly  Cardiovascular:      Rate and Rhythm: Normal rate and regular rhythm  Pulses:           Radial pulses are 2+ on the right side and 2+ on the left side  Heart sounds: No murmur heard  Pulmonary:      Effort: Pulmonary effort is normal  No respiratory distress  Breath sounds: Normal breath sounds  No wheezing     Musculoskeletal: Cervical back: Neck supple  Skin:     General: Skin is warm and dry  Neurological:      Mental Status: He is alert  Deep Tendon Reflexes: Reflexes are normal and symmetric  Patient's shoes and socks were not removed  Labs:   Component      Latest Ref Rng & Units 11/19/2020 3/19/2021   Glucose, Random      65 - 99 mg/dL 119 (H) 120 (H)   BUN      7 - 25 mg/dL 16 16   Creatinine      0 70 - 1 18 mg/dL 0 79 0 88   eGFR Non       > OR = 60 mL/min/1 73m2 92 88   eGFR       > OR = 60 mL/min/1 73m2 106 102   SL AMB BUN/CREATININE RATIO      6 - 22 (calc) NOT APPLICABLE NOT APPLICABLE   Sodium      135 - 146 mmol/L 141 142   Potassium      3 5 - 5 3 mmol/L 4 1 4 5   Chloride      98 - 110 mmol/L 105 105   CO2      20 - 32 mmol/L 27 29   Calcium      8 6 - 10 3 mg/dL 9 3 9 3   EXT Creatinine Urine      20 - 320 mg/dL  69   MICROALBUM ,U,RANDOM      See Note: mg/dL  0 4   MICROALBUMIN/CREATININE RATIO      <30 mcg/mg creat  6   Hemoglobin A1C      <5 7 % of total Hgb 6 5 (H) 6 8 (H)     Component      Latest Ref Rng & Units 7/26/2021   Glucose, Random      65 - 99 mg/dL 163 (H)   BUN      7 - 25 mg/dL 25   Creatinine      0 70 - 1 18 mg/dL 0 85   eGFR Non       > OR = 60 mL/min/1 73m2 88   eGFR       > OR = 60 mL/min/1 73m2 102   SL AMB BUN/CREATININE RATIO      6 - 22 (calc) NOT APPLICABLE   Sodium      135 - 146 mmol/L 138   Potassium      3 5 - 5 3 mmol/L 4 6   Chloride      98 - 110 mmol/L 104   CO2      20 - 32 mmol/L 25   Calcium      8 6 - 10 3 mg/dL 9 2   EXT Creatinine Urine      20 - 320 mg/dL    MICROALBUM ,U,RANDOM      See Note: mg/dL    MICROALBUMIN/CREATININE RATIO      <30 mcg/mg creat    Hemoglobin A1C      <5 7 % of total Hgb 6 8 (H)     Plan:    Diagnoses and all orders for this visit:    Type 2 diabetes mellitus with hyperglycemia, without long-term current use of insulin (HCC)  HGA1C 6 8%   Remained the Same   Treatment regimen:  Continue current treatment  Discussed risks/complications associated with uncontrolled diabetes  Advised to adhere to diabetic diet, and recommended staying active/exercising routinely as tolerated  Keep carbohydrates consistent to limit blood glucose fluctuations  Advised to call if blood sugars less than 70 mg/dl or over 300 mg/dl  Check blood glucose 1 time a day  Discussed symptoms and treatment of hypoglycemia  Recommended routine follow-up with podiatry and ophthalmology  Ordered blood work to complete prior to next visit  -     Hemoglobin A1C; Future  -     Comprehensive metabolic panel; Future    Benign essential hypertension  Blood pressure elevated  Advised to monitor blood pressure at home and follow-up with PCP if remaining elevated  For now continue current treatment  -     Comprehensive metabolic panel; Future    Mixed hyperlipidemia  LDL previously 42  Continue statin therapy  -     Lipid panel; Future         Discussed with the patient diagnosis and treatment and all questions fully answered  He will call me if any problems arise  Counseled patient on diagnostic results, prognosis, risk and benefit of treatment options, instruction for management, importance of treatment compliance, risk factor reduction and impressions        Jackie Flores PA-C

## 2021-07-29 NOTE — PATIENT INSTRUCTIONS
Hypoglycemia instructions   Rock Leosuzanne  7/29/2021  [id number]    Low Blood Sugar    Steps to treat low blood sugar  1  Test blood sugar if you have symptoms of low blood sugar:   Low Blood Sugar Symptoms:  o Sweaty  o Dizzy  o Rapid heartbeat  o Shaky  o Bad mood  o Hungry      2  Treat blood sugar less than 70 with 15 grams of fast-acting carbohydrate:   Examples of 15 grams Fast-Acting Carbohydrate:  o 4 oz juice  o 4 oz regular soda  o 3-4 glucose tablets (chew)  o 3-4 hard candies (chew)          3  Wait 15 minutes and test your blood sugar again     4   If blood sugar is less than 100, repeat steps 2-3     5  When your blood sugar is 100 or more, eat a snack if it will be longer than one hour until your next meal  The snack should be 15 grams of carbohydrate and a protein:   Examples of snacks:  o ½ sandwich  o 6 crackers with cheese  o Piece of fruit with cheese or peanut butter  o 6 crackers with peanut butter

## 2021-08-18 DIAGNOSIS — Z79.4 TYPE 2 DIABETES MELLITUS WITH COMPLICATION, WITH LONG-TERM CURRENT USE OF INSULIN (HCC): ICD-10-CM

## 2021-08-18 DIAGNOSIS — E11.8 TYPE 2 DIABETES MELLITUS WITH COMPLICATION, WITH LONG-TERM CURRENT USE OF INSULIN (HCC): ICD-10-CM

## 2021-08-18 DIAGNOSIS — E11.65 TYPE 2 DIABETES MELLITUS WITH HYPERGLYCEMIA, WITHOUT LONG-TERM CURRENT USE OF INSULIN (HCC): ICD-10-CM

## 2021-08-18 RX ORDER — LIRAGLUTIDE 6 MG/ML
INJECTION SUBCUTANEOUS
Qty: 27 ML | Refills: 1 | Status: SHIPPED | OUTPATIENT
Start: 2021-08-18 | End: 2022-01-04

## 2021-08-18 RX ORDER — EMPAGLIFLOZIN 10 MG/1
TABLET, FILM COATED ORAL
Qty: 90 TABLET | Refills: 1 | Status: SHIPPED | OUTPATIENT
Start: 2021-08-18 | End: 2022-02-21 | Stop reason: SDUPTHER

## 2021-08-19 ENCOUNTER — TELEPHONE (OUTPATIENT)
Dept: INTERNAL MEDICINE CLINIC | Age: 71
End: 2021-08-19

## 2021-09-27 DIAGNOSIS — E11.8 TYPE 2 DIABETES MELLITUS WITH COMPLICATION (HCC): ICD-10-CM

## 2021-09-27 RX ORDER — BLOOD SUGAR DIAGNOSTIC
1 STRIP MISCELLANEOUS 2 TIMES DAILY
Qty: 200 EACH | Refills: 1 | Status: SHIPPED | OUTPATIENT
Start: 2021-09-27

## 2021-11-05 NOTE — PROGRESS NOTES
Assessment/Plan:    1  Mid Back pain  He reports he has mid back pain especially when he turns or twists his back  His straight leg tenderness was negative and neuro exam was normal He was advised to use moist heat on his back for pain relief  He will have a spine XR for further evaluation of this back pain  He will also have a Sed Rate and a CBC drawn at his earliest convenience  2  Hypertension  His BP was slightly elevated at 138/68 today at the office  He was advised to improve his diet and decrease his salt intake to improve his BP  He was given a prescription for Lotrel 10-20 mg daily for his BP  He will have an electrolyte panel drawn prior to his next appointment  3  Hyperlipidemia  His total cholesterol was normal at 96, HDL was normal at 43, triglycerides were normal at 119, LDL was normal at 32 on 8/3/18  He is doing well with his cholesterol on his current diet  4  Diabetes  His HgbA1C was increased at 6 7 and his fasting glucose was elevated at 161 on 8/3/18  He admits he has not been following his diabetic diet closely, but has improved his diet overall  He will be seeing a diabetician in December  He was advised to minimize processed foods and increase natural foods in his diet  He was encouraged to minimize his white starches, carbohydrates, and concentrated sugars from his diet  5  Neuropathy  He reports he does not take his Gabapentin every day and his neuropathy in his feet is worse at night  He was instructed to increase his Gabapentin to 200 mg nightly because he has constantly been experiencing numbness in his feet  6  Toenail  He reports he had a board fall on his toenail which completely took his toenail off over 9 years ago  This toenail grew back over 9 years and does become ingrown occasionally which he removes  He was advised to let this toenail grow normally and see a podiatrist for further care when this toenail becomes ingrown  7  Mild tremor   He reports he has noticed a mild tremor in his hands bilaterally, worse in his left hand  He declined any medications currently for this  8  Healthcare Maintenance  He was advised about the benefits of the new Shingrix vaccine and was given a prescription  He declined to do this at the current time  He was advised to have a prostate exam and a colonoscopy  He will have a PSA and a occult stool test performed prior to his next appointment  He will follow up with 3 months or as needed  Diagnoses and all orders for this visit:    Essential hypertension  -     amLODIPine-benazepril (LOTREL) 10-20 MG per capsule; Take 1 capsule by mouth daily  -     Electrolyte panel; Future    Mid back pain  -     XR spine lumbar minimum 4 views non injury; Future  -     Sedimentation rate, automated; Future  -     XR spine thoracic 3 vw; Future    Healthcare maintenance  -     PSA Total, Diagnostic; Future  -     Occult blood x 3, stool; Future    Type 2 diabetes mellitus with diabetic cataract, without long-term current use of insulin (HCC)  -     CBC and differential; Future          Subjective:      Patient ID: Stansilaw Moreira is a 80 y o  male  Bran Ibanez is a 80year old male who presents today for a 10 week follow up regarding his hypertension , hyperlipidemia, and diabetes  He complains he has mid back pain especially when he turns or twists his back  He is unsure of whether this is exacerbated when he is mowing his lawn  He reports he has noticed a mild tremor in his hands bilaterally, worse in his left hand  He notes he has an umbilical hernia  He reports he had a board fall on his toenail which completely took his toenail off over 9 years ago  This toenail grew back over 9 years and does become ingrown occasionally which he removes  He admits he has not been following his diabetic diet   He has seen a nutritionist and has been trying to improve his diet  He also has had significant weight loss since Victoza     He reports he has cataracts in his eyes bilaterally but he does not afford surgery for removal            The following portions of the patient's history were reviewed and updated as appropriate: allergies, current medications, past family history, past medical history, past social history, past surgical history and problem list     Review of Systems   Constitutional: Positive for activity change (slower now)  Negative for unexpected weight change (wt   loss with victoza)  HENT: Negative for congestion, hearing loss, nosebleeds, postnasal drip, rhinorrhea, sinus pain and sinus pressure  Eyes: Negative for photophobia, pain, discharge, redness, itching and visual disturbance  Has cataracts   Respiratory: Negative  Cardiovascular: Negative for chest pain, palpitations and leg swelling  Gastrointestinal: Negative for abdominal distention, abdominal pain, anal bleeding, blood in stool, constipation, diarrhea, nausea and rectal pain  Genitourinary: Negative for difficulty urinating, frequency, testicular pain and urgency  Musculoskeletal: Positive for back pain (mid back)  Negative for myalgias  Skin: Negative  Lipoma of left shoulder  And umbilical hernia   Neurological: Positive for tremors (left over rt  mild)  Negative for dizziness, seizures, syncope, speech difficulty, weakness, numbness and headaches  Psychiatric/Behavioral: Negative  Objective:      /68 (BP Location: Left arm, Patient Position: Sitting, Cuff Size: Standard)   Pulse 81   Temp 97 5 °F (36 4 °C) (Tympanic)   Ht 6' 1 23" (1 86 m)   Wt 118 kg (260 lb 9 6 oz)   SpO2 97%   BMI 34 17 kg/m²          Physical Exam   Constitutional: He is oriented to person, place, and time  He appears well-developed and well-nourished  No distress  HENT:   Head: Normocephalic and atraumatic  Eyes: Conjunctivae and EOM are normal  Right eye exhibits no discharge  Left eye exhibits no discharge  No scleral icterus     Neck: Normal range of motion  Neck supple  No thyromegaly present  Cardiovascular: Normal rate, regular rhythm and normal heart sounds  No murmur heard  Pulmonary/Chest: No respiratory distress  He has no wheezes  He has no rales  Abdominal: Soft  Bowel sounds are normal  He exhibits distension and mass (hernias noted)  There is no tenderness  There is no rebound and no guarding  Ventral and umbilical hernia   Musculoskeletal: He exhibits no edema or tenderness  Rt  Sided muscular discomfot at T12   Neurological: He is alert and oriented to person, place, and time  He has normal reflexes  Skin: Skin is warm and dry  No rash noted  No erythema  No pallor  Psychiatric: He has a normal mood and affect  His behavior is normal  Thought content normal          Scribe Signature and Attestation:  By signing my name below, IPiotr attest that this documentation has been prepared under the direction and in the presence of Dr Erum Smith MD  Electronically signed: Ananda Henderson  8/15/18    Provider Attestation:  I, Dr Erum Smith, personally performed the services described in this documentation  All medical record entries made by the chemaibshamir were at my direction and in my presence  I have reviewed the chart and discharge instructions (if applicable) and agree that the record reflects my personal performance and is accurate and complete  Dr Erum Smith MD  8/15/18  no

## 2021-11-20 DIAGNOSIS — E11.8 TYPE 2 DIABETES MELLITUS WITH COMPLICATION (HCC): ICD-10-CM

## 2021-11-20 DIAGNOSIS — E78.2 MIXED HYPERLIPIDEMIA: ICD-10-CM

## 2021-11-22 RX ORDER — ATORVASTATIN CALCIUM 40 MG/1
TABLET, FILM COATED ORAL
Qty: 90 TABLET | Refills: 1 | Status: SHIPPED | OUTPATIENT
Start: 2021-11-22 | End: 2022-05-10

## 2021-11-22 RX ORDER — GLIMEPIRIDE 2 MG/1
TABLET ORAL
Qty: 270 TABLET | Refills: 1 | Status: SHIPPED | OUTPATIENT
Start: 2021-11-22 | End: 2022-05-03 | Stop reason: SDUPTHER

## 2021-12-06 DIAGNOSIS — I10 ESSENTIAL HYPERTENSION: ICD-10-CM

## 2021-12-06 RX ORDER — AMLODIPINE BESYLATE AND BENAZEPRIL HYDROCHLORIDE 10; 20 MG/1; MG/1
1 CAPSULE ORAL DAILY
Qty: 90 CAPSULE | Refills: 1 | Status: SHIPPED | OUTPATIENT
Start: 2021-12-06 | End: 2022-05-10 | Stop reason: SDUPTHER

## 2021-12-07 LAB
ALBUMIN SERPL-MCNC: 3.9 G/DL (ref 3.6–5.1)
ALBUMIN/GLOB SERPL: 1.4 (CALC) (ref 1–2.5)
ALP SERPL-CCNC: 55 U/L (ref 35–144)
ALT SERPL-CCNC: 25 U/L (ref 9–46)
AST SERPL-CCNC: 19 U/L (ref 10–35)
BILIRUB SERPL-MCNC: 1.4 MG/DL (ref 0.2–1.2)
BUN SERPL-MCNC: 15 MG/DL (ref 7–25)
BUN/CREAT SERPL: 22 (CALC) (ref 6–22)
CALCIUM SERPL-MCNC: 8.6 MG/DL (ref 8.6–10.3)
CHLORIDE SERPL-SCNC: 108 MMOL/L (ref 98–110)
CHOLEST SERPL-MCNC: 94 MG/DL
CHOLEST/HDLC SERPL: 3.1 (CALC)
CO2 SERPL-SCNC: 27 MMOL/L (ref 20–32)
CREAT SERPL-MCNC: 0.69 MG/DL (ref 0.7–1.18)
GLOBULIN SER CALC-MCNC: 2.7 G/DL (CALC) (ref 1.9–3.7)
GLUCOSE SERPL-MCNC: 151 MG/DL (ref 65–99)
HBA1C MFR BLD: 6.6 % OF TOTAL HGB
HDLC SERPL-MCNC: 30 MG/DL
LDLC SERPL CALC-MCNC: 46 MG/DL (CALC)
NONHDLC SERPL-MCNC: 64 MG/DL (CALC)
POTASSIUM SERPL-SCNC: 3.7 MMOL/L (ref 3.5–5.3)
PROT SERPL-MCNC: 6.6 G/DL (ref 6.1–8.1)
SL AMB EGFR AFRICAN AMERICAN: 111 ML/MIN/1.73M2
SL AMB EGFR NON AFRICAN AMERICAN: 96 ML/MIN/1.73M2
SODIUM SERPL-SCNC: 141 MMOL/L (ref 135–146)
TRIGL SERPL-MCNC: 95 MG/DL

## 2021-12-07 PROCEDURE — 3044F HG A1C LEVEL LT 7.0%: CPT | Performed by: PHYSICIAN ASSISTANT

## 2021-12-09 ENCOUNTER — OFFICE VISIT (OUTPATIENT)
Dept: ENDOCRINOLOGY | Facility: CLINIC | Age: 71
End: 2021-12-09
Payer: COMMERCIAL

## 2021-12-09 ENCOUNTER — OFFICE VISIT (OUTPATIENT)
Dept: INTERNAL MEDICINE CLINIC | Age: 71
End: 2021-12-09
Payer: COMMERCIAL

## 2021-12-09 VITALS
HEART RATE: 76 BPM | HEIGHT: 73 IN | BODY MASS INDEX: 33.53 KG/M2 | DIASTOLIC BLOOD PRESSURE: 70 MMHG | WEIGHT: 253 LBS | SYSTOLIC BLOOD PRESSURE: 140 MMHG | TEMPERATURE: 98.2 F

## 2021-12-09 VITALS
DIASTOLIC BLOOD PRESSURE: 60 MMHG | BODY MASS INDEX: 33.27 KG/M2 | WEIGHT: 251 LBS | TEMPERATURE: 99.2 F | SYSTOLIC BLOOD PRESSURE: 142 MMHG | HEIGHT: 73 IN | HEART RATE: 81 BPM | OXYGEN SATURATION: 96 %

## 2021-12-09 DIAGNOSIS — Z12.5 PROSTATE CANCER SCREENING: ICD-10-CM

## 2021-12-09 DIAGNOSIS — E11.65 TYPE 2 DIABETES MELLITUS WITH HYPERGLYCEMIA, WITHOUT LONG-TERM CURRENT USE OF INSULIN (HCC): ICD-10-CM

## 2021-12-09 DIAGNOSIS — I10 BENIGN ESSENTIAL HYPERTENSION: ICD-10-CM

## 2021-12-09 DIAGNOSIS — Z00.00 ENCOUNTER FOR SUBSEQUENT ANNUAL WELLNESS VISIT (AWV) IN MEDICARE PATIENT: ICD-10-CM

## 2021-12-09 DIAGNOSIS — F51.01 PRIMARY INSOMNIA: ICD-10-CM

## 2021-12-09 DIAGNOSIS — E11.65 TYPE 2 DIABETES MELLITUS WITH HYPERGLYCEMIA, WITHOUT LONG-TERM CURRENT USE OF INSULIN (HCC): Primary | ICD-10-CM

## 2021-12-09 DIAGNOSIS — Z87.891 HISTORY OF TOBACCO USE DISORDER: ICD-10-CM

## 2021-12-09 DIAGNOSIS — E78.2 MIXED HYPERLIPIDEMIA: ICD-10-CM

## 2021-12-09 DIAGNOSIS — I10 BENIGN ESSENTIAL HYPERTENSION: Primary | ICD-10-CM

## 2021-12-09 PROCEDURE — 1160F RVW MEDS BY RX/DR IN RCRD: CPT | Performed by: PHYSICIAN ASSISTANT

## 2021-12-09 PROCEDURE — 3008F BODY MASS INDEX DOCD: CPT | Performed by: PHYSICIAN ASSISTANT

## 2021-12-09 PROCEDURE — 3077F SYST BP >= 140 MM HG: CPT | Performed by: PHYSICIAN ASSISTANT

## 2021-12-09 PROCEDURE — 1125F AMNT PAIN NOTED PAIN PRSNT: CPT | Performed by: PHYSICIAN ASSISTANT

## 2021-12-09 PROCEDURE — 1170F FXNL STATUS ASSESSED: CPT | Performed by: PHYSICIAN ASSISTANT

## 2021-12-09 PROCEDURE — 99214 OFFICE O/P EST MOD 30 MIN: CPT | Performed by: PHYSICIAN ASSISTANT

## 2021-12-09 PROCEDURE — 3288F FALL RISK ASSESSMENT DOCD: CPT | Performed by: PHYSICIAN ASSISTANT

## 2021-12-09 PROCEDURE — G0439 PPPS, SUBSEQ VISIT: HCPCS | Performed by: PHYSICIAN ASSISTANT

## 2021-12-09 PROCEDURE — 1036F TOBACCO NON-USER: CPT | Performed by: PHYSICIAN ASSISTANT

## 2021-12-09 PROCEDURE — 3078F DIAST BP <80 MM HG: CPT | Performed by: PHYSICIAN ASSISTANT

## 2021-12-09 PROCEDURE — 3725F SCREEN DEPRESSION PERFORMED: CPT | Performed by: PHYSICIAN ASSISTANT

## 2021-12-09 RX ORDER — TRAZODONE HYDROCHLORIDE 50 MG/1
50 TABLET ORAL
Qty: 30 TABLET | Refills: 1 | Status: SHIPPED | OUTPATIENT
Start: 2021-12-09 | End: 2022-02-17 | Stop reason: SDUPTHER

## 2021-12-16 ENCOUNTER — TELEPHONE (OUTPATIENT)
Dept: INTERNAL MEDICINE CLINIC | Age: 71
End: 2021-12-16

## 2021-12-29 ENCOUNTER — TELEPHONE (OUTPATIENT)
Dept: ENDOCRINOLOGY | Facility: CLINIC | Age: 71
End: 2021-12-29

## 2021-12-29 DIAGNOSIS — E11.65 TYPE 2 DIABETES MELLITUS WITH HYPERGLYCEMIA, WITHOUT LONG-TERM CURRENT USE OF INSULIN (HCC): ICD-10-CM

## 2021-12-29 DIAGNOSIS — Z79.4 TYPE 2 DIABETES MELLITUS WITH COMPLICATION, WITH LONG-TERM CURRENT USE OF INSULIN (HCC): ICD-10-CM

## 2021-12-29 DIAGNOSIS — E11.8 TYPE 2 DIABETES MELLITUS WITH COMPLICATION, WITH LONG-TERM CURRENT USE OF INSULIN (HCC): ICD-10-CM

## 2021-12-29 RX ORDER — PEN NEEDLE, DIABETIC 32GX 5/32"
NEEDLE, DISPOSABLE MISCELLANEOUS DAILY
Qty: 100 EACH | Refills: 1 | Status: SHIPPED | OUTPATIENT
Start: 2021-12-29 | End: 2022-05-11 | Stop reason: SDUPTHER

## 2022-02-17 DIAGNOSIS — F51.01 PRIMARY INSOMNIA: ICD-10-CM

## 2022-02-17 RX ORDER — TRAZODONE HYDROCHLORIDE 50 MG/1
50 TABLET ORAL
Qty: 90 TABLET | Refills: 1 | Status: SHIPPED | OUTPATIENT
Start: 2022-02-17

## 2022-02-21 DIAGNOSIS — E11.65 TYPE 2 DIABETES MELLITUS WITH HYPERGLYCEMIA, WITHOUT LONG-TERM CURRENT USE OF INSULIN (HCC): ICD-10-CM

## 2022-04-08 ENCOUNTER — TELEPHONE (OUTPATIENT)
Dept: INTERNAL MEDICINE CLINIC | Facility: OTHER | Age: 72
End: 2022-04-08

## 2022-04-08 NOTE — TELEPHONE ENCOUNTER
Pt did not yet schedule his diabetic eye exam   He is waiting for heating season is over  Pt sees Dr Nahid Rivera at [facility] and will make an appt

## 2022-05-02 LAB
BUN SERPL-MCNC: 18 MG/DL (ref 7–25)
BUN/CREAT SERPL: ABNORMAL (CALC) (ref 6–22)
CALCIUM SERPL-MCNC: 9.2 MG/DL (ref 8.6–10.3)
CHLORIDE SERPL-SCNC: 103 MMOL/L (ref 98–110)
CO2 SERPL-SCNC: 28 MMOL/L (ref 20–32)
CREAT SERPL-MCNC: 0.76 MG/DL (ref 0.7–1.18)
GLUCOSE SERPL-MCNC: 119 MG/DL (ref 65–99)
HBA1C MFR BLD: 6.8 % OF TOTAL HGB
POTASSIUM SERPL-SCNC: 3.8 MMOL/L (ref 3.5–5.3)
PSA SERPL-MCNC: 2.79 NG/ML
SL AMB EGFR AFRICAN AMERICAN: 107 ML/MIN/1.73M2
SL AMB EGFR NON AFRICAN AMERICAN: 92 ML/MIN/1.73M2
SODIUM SERPL-SCNC: 140 MMOL/L (ref 135–146)

## 2022-05-03 DIAGNOSIS — E11.8 TYPE 2 DIABETES MELLITUS WITH COMPLICATION (HCC): ICD-10-CM

## 2022-05-03 DIAGNOSIS — E11.65 TYPE 2 DIABETES MELLITUS WITH HYPERGLYCEMIA, WITHOUT LONG-TERM CURRENT USE OF INSULIN (HCC): ICD-10-CM

## 2022-05-03 RX ORDER — GLIMEPIRIDE 2 MG/1
TABLET ORAL
Qty: 270 TABLET | Refills: 1 | Status: SHIPPED | OUTPATIENT
Start: 2022-05-03

## 2022-05-10 DIAGNOSIS — I10 ESSENTIAL HYPERTENSION: ICD-10-CM

## 2022-05-10 DIAGNOSIS — E78.2 MIXED HYPERLIPIDEMIA: ICD-10-CM

## 2022-05-10 RX ORDER — AMLODIPINE BESYLATE AND BENAZEPRIL HYDROCHLORIDE 10; 20 MG/1; MG/1
1 CAPSULE ORAL DAILY
Qty: 90 CAPSULE | Refills: 1 | Status: SHIPPED | OUTPATIENT
Start: 2022-05-10

## 2022-05-10 RX ORDER — ATORVASTATIN CALCIUM 40 MG/1
TABLET, FILM COATED ORAL
Qty: 90 TABLET | Refills: 1 | Status: SHIPPED | OUTPATIENT
Start: 2022-05-10

## 2022-05-10 RX ORDER — AMLODIPINE BESYLATE AND BENAZEPRIL HYDROCHLORIDE 10; 20 MG/1; MG/1
CAPSULE ORAL
Qty: 90 CAPSULE | Refills: 1 | OUTPATIENT
Start: 2022-05-10

## 2022-05-11 DIAGNOSIS — E11.65 TYPE 2 DIABETES MELLITUS WITH HYPERGLYCEMIA, WITHOUT LONG-TERM CURRENT USE OF INSULIN (HCC): ICD-10-CM

## 2022-05-11 RX ORDER — PEN NEEDLE, DIABETIC 32GX 5/32"
NEEDLE, DISPOSABLE MISCELLANEOUS DAILY
Qty: 100 EACH | Refills: 1 | Status: SHIPPED | OUTPATIENT
Start: 2022-05-11 | End: 2022-05-12 | Stop reason: SDUPTHER

## 2022-05-12 DIAGNOSIS — E11.65 TYPE 2 DIABETES MELLITUS WITH HYPERGLYCEMIA, WITHOUT LONG-TERM CURRENT USE OF INSULIN (HCC): ICD-10-CM

## 2022-05-12 RX ORDER — PEN NEEDLE, DIABETIC 32GX 5/32"
NEEDLE, DISPOSABLE MISCELLANEOUS DAILY
Qty: 100 EACH | Refills: 1 | Status: SHIPPED | OUTPATIENT
Start: 2022-05-12 | End: 2022-05-13 | Stop reason: SDUPTHER

## 2022-05-13 DIAGNOSIS — E11.65 TYPE 2 DIABETES MELLITUS WITH HYPERGLYCEMIA, WITHOUT LONG-TERM CURRENT USE OF INSULIN (HCC): ICD-10-CM

## 2022-05-13 RX ORDER — PEN NEEDLE, DIABETIC 32GX 5/32"
NEEDLE, DISPOSABLE MISCELLANEOUS DAILY
Qty: 100 EACH | Refills: 1 | Status: SHIPPED | OUTPATIENT
Start: 2022-05-13 | End: 2022-05-16 | Stop reason: SDUPTHER

## 2022-05-16 DIAGNOSIS — E11.65 TYPE 2 DIABETES MELLITUS WITH HYPERGLYCEMIA, WITHOUT LONG-TERM CURRENT USE OF INSULIN (HCC): ICD-10-CM

## 2022-05-16 RX ORDER — PEN NEEDLE, DIABETIC 32GX 5/32"
NEEDLE, DISPOSABLE MISCELLANEOUS DAILY
Qty: 100 EACH | Refills: 1 | Status: SHIPPED | OUTPATIENT
Start: 2022-05-16 | End: 2022-06-07

## 2022-06-07 ENCOUNTER — OFFICE VISIT (OUTPATIENT)
Dept: ENDOCRINOLOGY | Facility: CLINIC | Age: 72
End: 2022-06-07
Payer: COMMERCIAL

## 2022-06-07 ENCOUNTER — OFFICE VISIT (OUTPATIENT)
Dept: INTERNAL MEDICINE CLINIC | Age: 72
End: 2022-06-07
Payer: COMMERCIAL

## 2022-06-07 VITALS
OXYGEN SATURATION: 97 % | TEMPERATURE: 98.3 F | HEIGHT: 73 IN | BODY MASS INDEX: 34.39 KG/M2 | WEIGHT: 259.5 LBS | SYSTOLIC BLOOD PRESSURE: 136 MMHG | HEART RATE: 90 BPM | DIASTOLIC BLOOD PRESSURE: 72 MMHG

## 2022-06-07 VITALS
DIASTOLIC BLOOD PRESSURE: 72 MMHG | SYSTOLIC BLOOD PRESSURE: 144 MMHG | WEIGHT: 259 LBS | TEMPERATURE: 97.1 F | HEART RATE: 86 BPM | BODY MASS INDEX: 34.17 KG/M2

## 2022-06-07 DIAGNOSIS — I25.10 CORONARY ARTERY DISEASE WITH HISTORY OF MYOCARDIAL INFARCTION WITHOUT HISTORY OF CABG: ICD-10-CM

## 2022-06-07 DIAGNOSIS — E78.2 MIXED HYPERLIPIDEMIA: ICD-10-CM

## 2022-06-07 DIAGNOSIS — E11.65 TYPE 2 DIABETES MELLITUS WITH HYPERGLYCEMIA, WITHOUT LONG-TERM CURRENT USE OF INSULIN (HCC): Primary | ICD-10-CM

## 2022-06-07 DIAGNOSIS — F41.9 ANXIETY: ICD-10-CM

## 2022-06-07 DIAGNOSIS — D22.9 ATYPICAL NEVI: ICD-10-CM

## 2022-06-07 DIAGNOSIS — I10 BENIGN ESSENTIAL HYPERTENSION: ICD-10-CM

## 2022-06-07 DIAGNOSIS — G62.9 NEUROPATHY: ICD-10-CM

## 2022-06-07 DIAGNOSIS — D17.22 LIPOMA OF LEFT UPPER EXTREMITY: ICD-10-CM

## 2022-06-07 DIAGNOSIS — L98.9 SKIN LESION: ICD-10-CM

## 2022-06-07 DIAGNOSIS — I25.2 CORONARY ARTERY DISEASE WITH HISTORY OF MYOCARDIAL INFARCTION WITHOUT HISTORY OF CABG: ICD-10-CM

## 2022-06-07 DIAGNOSIS — G25.81 RLS (RESTLESS LEGS SYNDROME): ICD-10-CM

## 2022-06-07 PROCEDURE — 3008F BODY MASS INDEX DOCD: CPT | Performed by: INTERNAL MEDICINE

## 2022-06-07 PROCEDURE — 1160F RVW MEDS BY RX/DR IN RCRD: CPT | Performed by: INTERNAL MEDICINE

## 2022-06-07 PROCEDURE — 3078F DIAST BP <80 MM HG: CPT | Performed by: INTERNAL MEDICINE

## 2022-06-07 PROCEDURE — 3075F SYST BP GE 130 - 139MM HG: CPT | Performed by: INTERNAL MEDICINE

## 2022-06-07 PROCEDURE — 99214 OFFICE O/P EST MOD 30 MIN: CPT | Performed by: PHYSICIAN ASSISTANT

## 2022-06-07 PROCEDURE — 99215 OFFICE O/P EST HI 40 MIN: CPT | Performed by: INTERNAL MEDICINE

## 2022-06-07 RX ORDER — GABAPENTIN 100 MG/1
100 CAPSULE ORAL
Qty: 60 CAPSULE | Refills: 1 | Status: SHIPPED | OUTPATIENT
Start: 2022-06-07 | End: 2022-08-10 | Stop reason: SDUPTHER

## 2022-06-07 RX ORDER — PEN NEEDLE, DIABETIC 32 GX 1/6"
NEEDLE, DISPOSABLE MISCELLANEOUS
Qty: 100 EACH | Refills: 1 | Status: SHIPPED | OUTPATIENT
Start: 2022-06-07

## 2022-06-07 NOTE — PROGRESS NOTES
Patient Progress Note      CC: DM      Referring Provider  No referring provider defined for this encounter  History of Present Illness:   Tian Romero is a 80 y o  male with a history of type 2 diabetes without long term use of insulin  Diabetes course has been stable  Complications of DM: CAD, neuropathy  Denies recent illness or hospitalizations  Denies recent severe hypoglycemic or severe hyperglycemic episodes  Denies any issues with his current regimen  Home glucose monitoring: are performed sporadically, 2-3 times a week     Home blood glucose readings:  mg/dl     Current regimen: metformin 1000 mg BID, glimepiride  2 mg with breakfast and 4 mg with dinner, Victoza 1 8 mg daily, Jardiance 10 mg daily  compliant most of the time, denies any side effects from medications  Injects in: abdomen  Rotates sites: Yes  Hypoglycemic episodes: No, rare  H/o of hypoglycemia causing hospitalization or Intervention such as glucagon injection  or ambulance call :  No  Hypoglycemia symptoms: jitteriness  Treatment of hypoglycemia: candy      Medic alert tag: recommended: Yes     Diet: 3 meals per day, 1 snack per day  Timing of meals is predictable  Diabetic diet compliance:  noncompliant some of the time  Activity: Daily activity is predictable: Yes  No routine exercise but during the summer will be doing more stuff around the house / yard  Ophthamology: March 2020  Needs to schedule an appointment  Podiatry: overdue, May 2021     Has hypertension: on ACE inhibitor/ARB, compliant most of the time  Has hyperlipidemia: on statin - tolerating well, no myalgias  compliant most of the time, denies any side effects from medications    Thyroid disorders: No  History of pancreatitis: No    Patient Active Problem List   Diagnosis    Allergic rhinitis    Anxiety    Basal cell carcinoma of face    Benign essential hypertension    Coronary artery disease with history of myocardial infarction without history of CABG    Mixed hyperlipidemia    Type 2 diabetes mellitus with hyperglycemia, without long-term current use of insulin ([facility]    Welcome to Medicare preventive visit    Elevated bilirubin      Past Medical History:   Diagnosis Date    Cellulitis     last assessed 7/3/13    Diabetes ([facility] )     last assessed 4/10/13    Gastroenteritis     last assessed 3/30/17    High cholesterol     Hypertension     Rhus dermatitis     last assessed 8/26/15    Skin lesion of face     last assessed 3/30/17      Past Surgical History:   Procedure Laterality Date    ROTATOR CUFF REPAIR        Family History   Problem Relation Age of Onset    No Known Problems Family     Diabetes unspecified Mother     No Known Problems Father      Social History     Tobacco Use    Smoking status: Former Smoker     Years: 3 00     Types: Cigarettes, Cigars     Quit date: 1989     Years since quittin 5    Smokeless tobacco: Never Used   Substance Use Topics    Alcohol use: Yes     Comment: rare- 1 if out to eat- rarely      Allergies   Allergen Reactions    Januvia [Sitagliptin] Other (See Comments)     Unknown reaction, patient can't recall         Current Outpatient Medications:     amLODIPine-benazepril (LOTREL) 10-20 MG per capsule, Take 1 capsule by mouth daily, Disp: 90 capsule, Rfl: 1    atorvastatin (LIPITOR) 40 mg tablet, TAKE 1 TABLET BY MOUTH EVERY DAY, Disp: 90 tablet, Rfl: 1    Empagliflozin (Jardiance) 10 MG TABS, Take 1 tablet (10 mg total) by mouth daily, Disp: 90 tablet, Rfl: 1    glimepiride (AMARYL) 2 mg tablet, Take 1 tablet with breakfast and 2 tablets with dinner, Disp: 270 tablet, Rfl: 1    Insulin Pen Needle (NovoFine Plus Pen Needle) 32G X 4 MM MISC, Use daily, Disp: 100 each, Rfl: 1    Lancets (OneTouch Delica Plus [id number]) MISC, Use 2 times a day, Disp: 200 each, Rfl: 1    metFORMIN (GLUCOPHAGE) 1000 MG tablet, Take 1 tablet (1,000 mg total) by mouth 2 (two) times a day, Disp: 180 tablet, Rfl: 1    Naproxen Sodium (ALEVE PO), Take by mouth as needed, Disp: , Rfl:     OneTouch Verio test strip, Use 1 each 2 (two) times a day Use as instructed, Disp: 200 each, Rfl: 1    traZODone (DESYREL) 50 mg tablet, Take 1 tablet (50 mg total) by mouth daily at bedtime, Disp: 90 tablet, Rfl: 1    Victoza injection, INJECT 1 8 MG UNDER THE SKIN ONCE DAILY, Disp: 27 mL, Rfl: 1    rOPINIRole (REQUIP) 0 5 mg tablet, Take 1 tablet (0 5 mg total) by mouth daily at bedtime (Patient not taking: No sig reported), Disp: 30 tablet, Rfl: 2  Review of Systems   Constitutional: Negative for activity change, appetite change, fatigue and unexpected weight change  HENT: Negative for trouble swallowing  Eyes: Positive for visual disturbance (cataracts)  Respiratory: Negative for shortness of breath  Cardiovascular: Negative for chest pain and palpitations  Gastrointestinal: Negative for constipation and diarrhea  Endocrine: Negative for polydipsia and polyuria  Musculoskeletal: Negative  Skin: Negative for wound  Neurological: Positive for numbness  Tingling in feet   Psychiatric/Behavioral: Negative  Physical Exam:  Body mass index is 34 17 kg/m²  /72   Pulse 86   Temp (!) 97 1 °F (36 2 °C)   Wt 117 kg (259 lb)   BMI 34 17 kg/m²    Wt Readings from Last 3 Encounters:   06/07/22 117 kg (259 lb)   12/09/21 115 kg (253 lb)   12/09/21 114 kg (251 lb)       Physical Exam  Vitals and nursing note reviewed  Constitutional:       Appearance: He is well-developed  HENT:      Head: Normocephalic  Eyes:      General: No scleral icterus  Pupils: Pupils are equal, round, and reactive to light  Neck:      Thyroid: No thyromegaly  Cardiovascular:      Rate and Rhythm: Normal rate and regular rhythm  Pulses:           Radial pulses are 2+ on the right side and 2+ on the left side  Heart sounds: No murmur heard    Pulmonary:      Effort: Pulmonary effort is normal  No respiratory distress  Breath sounds: Normal breath sounds  No wheezing  Musculoskeletal:      Cervical back: Neck supple  Skin:     General: Skin is warm and dry  Neurological:      Mental Status: He is alert  Patient's shoes and socks were not removed  Labs:   Component      Latest Ref Rng & Units 12/7/2021 5/2/2022   Glucose, Random      65 - 99 mg/dL 151 (H) 119 (H)   BUN      7 - 25 mg/dL 15 18   Creatinine      0 70 - 1 18 mg/dL 0 69 (L) 0 76   eGFR Non       > OR = 60 mL/min/1 73m2 96 92   eGFR       > OR = 60 mL/min/1 73m2 111 107   SL AMB BUN/CREATININE RATIO      6 - 22 (calc) 22 NOT APPLICABLE   Sodium      135 - 146 mmol/L 141 140   Potassium      3 5 - 5 3 mmol/L 3 7 3 8   Chloride      98 - 110 mmol/L 108 103   CO2      20 - 32 mmol/L 27 28   Calcium      8 6 - 10 3 mg/dL 8 6 9 2   Total Protein      6 1 - 8 1 g/dL 6 6    Albumin      3 6 - 5 1 g/dL 3 9    Globulin      1 9 - 3 7 g/dL (calc) 2 7    Albumin/Globulin Ratio      1 0 - 2 5 (calc) 1 4    TOTAL BILIRUBIN      0 2 - 1 2 mg/dL 1 4 (H)    Alkaline Phosphatase      35 - 144 U/L 55    AST      10 - 35 U/L 19    ALT      9 - 46 U/L 25    Cholesterol      <200 mg/dL 94    HDL      > OR = 40 mg/dL 30 (L)    Triglycerides      <150 mg/dL 95    LDL Calculated      mg/dL (calc) 46    Chol HDLC Ratio      <5 0 (calc) 3 1    Non-HDL Cholesterol      <130 mg/dL (calc) 64    Hemoglobin A1C      <5 7 % of total Hgb 6 6 (H) 6 8 (H)       Plan:    Diagnoses and all orders for this visit:    Type 2 diabetes mellitus with hyperglycemia, without long-term current use of insulin (HCC)  HGA1C 6 8%  Worsened  Treatment regimen: continue current treatment   Episodes of hypoglycemia can lead to permanent disability and death  Discussed risks/complications associated with uncontrolled diabetes      Advised to adhere to diabetic diet, and recommended staying active/exercising routinely as tolerated  Keep carbohydrates consistent to limit blood glucose fluctuations  Advised to call if blood sugars less than 70 mg/dl or over 300 mg/dl  Check blood glucose 1 time a day  Discussed symptoms and treatment of hypoglycemia  Recommended routine follow-up with podiatry and ophthalmology  Ordered blood work to complete prior to next visit  -     Hemoglobin A1C; Future  -     Basic metabolic panel; Future  -     Microalbumin / creatinine urine ratio; Future  -     Insulin Pen Needle (NovoFine Plus Pen Needle) 32G X 4 MM MISC; Use daily    Benign essential hypertension  Blood pressure adequately controlled, continue current treatment   -     Basic metabolic panel; Future    Mixed hyperlipidemia  LDL previously 46  Continue statin therapy         Discussed with the patient diagnosis and treatment and all questions fully answered  He will call me if any problems arise  Counseled patient on diagnostic results, prognosis, risk and benefit of treatment options, instruction for management, importance of treatment compliance, risk factor reduction and impressions        Jackie Flores PA-C

## 2022-06-07 NOTE — PROGRESS NOTES
Assessment/Plan:    Diabetes mellitus type 2  -well controlled, last hemoglobin A1c was 6 8  -continue with Jardiance, Victoza, glimepiride and metformin  -continue with Accu-Cheks and a diabetic diet  -continue to follow with endocrinology  -diabetic foot exam was done today  -will refer patient to Ophthalmology for diabetic eye exam    Benign essential hypertension  -well controlled  -continue with amlodipine-benazepril  -continue with a low-salt diet    Coronary artery disease  -stable  -continue with atorvastatin  -continue with heart healthy diet    Hyperlipidemia  -stable  -continue with atorvastatin  -continue with a heart healthy diet    Atypical nevi  -will refer patient to Dermatology ASAP for excision biopsy especially with his history of skin cancer    Lipoma of the left upper extremity  -I offered to refer patient to general surgery for excision but he wants to confirm with his insurance that they will pay for it 1st  -follow-up with PCP    Neuropathy  -will trial patient on gabapentin 100 mg at nighttime  -he was counseled that he may increase the dose to 200 mg at nighttime after 1 week if his symptoms are not well controlled    Restless leg syndrome  -will order CBC and ferritin level  -gabapentin will likely help with his symptoms as well     Diagnoses and all orders for this visit:    Type 2 diabetes mellitus with hyperglycemia, without long-term current use of insulin ([facility] [address] )  -     Ambulatory Referral to Ophthalmology; Future  -     gabapentin (Neurontin) 100 mg capsule; Take 1 capsule (100 mg total) by mouth daily at bedtime    Benign essential hypertension    Coronary artery disease with history of myocardial infarction without history of CABG  -     CBC and differential; Future    Anxiety    Mixed hyperlipidemia    Atypical nevi  -     Ambulatory Referral to Dermatology; Future  -     CBC and differential; Future    Skin lesion  -     Ambulatory Referral to Dermatology;  Future  -     CBC and differential; Future    Lipoma of left upper extremity- large  -     CBC and differential; Future    Neuropathy  -     gabapentin (Neurontin) 100 mg capsule; Take 1 capsule (100 mg total) by mouth daily at bedtime  -     CBC and differential; Future    RLS (restless legs syndrome)  -     CBC and differential; Future  -     Ferritin; Future    BMI 34 0-34 9,adult          BMI Counseling: Body mass index is 34 24 kg/m²  The BMI is above normal  Nutrition recommendations include limiting drinks that contain sugar, reducing intake of saturated and trans fat and reducing intake of cholesterol  Exercise recommendations include moderate physical activity 150 minutes/week  No pharmacotherapy was ordered  Patient referred to PCP  Rationale for BMI follow-up plan is due to patient being overweight or obese  Subjective:      Patient ID: Chavez Baumann is a 80 y o  male  HPI  Pt presents for a follow up visit regarding his dm, htn, hyperlipidemia, coronary artery disease  Sees endocrinology every 3-4 months for his diabetes and just recently was seen by them  Last ophthalmology visit was 6 years ago but he states that he will schedule it when he has some more money because he needs other things done  He is supposed to get catarct sx done as well  He is also supposed to get new glasses too and states that that will cost him some money which he does not have now  He also complains of a growth on top of his left arm/shoulder that has been present for years but has been growing bigger  He states that he did not think that his health insurance would cover further surgical excision of the lesion  He also complains of a lesion on his right big toe that is black and has been present for about 4 weeks  He denies any history of trauma and states that he just so it 1 day  Of note, he has a history of skin cancer status post excision  He admits to sunburns in the past   He works as a     He also states that his restless leg syndrome occasionally acts up  He stopped taking the Requip because it did not work for him  Has been diabetic for about 15 years  drinks a lot of [place] iced tea, rarely drinks water  Trazodone helps with sleep and he feels that it also helps the RLS symptoms  He admits to polydipsia, polyuria , visual disturbance and back stiffness but denies fever, chills, night sweats, headache, dizziness, chest pain, shortness of breath, palpitations, nausea, vomiting abdominal pain, diarrhea, constipation, myalgias, arthralgias  The following portions of the patient's history were reviewed and updated as appropriate:   He  has a past medical history of Cellulitis, Diabetes ([address] ), Gastroenteritis, High cholesterol, Hypertension, Rhus dermatitis, and Skin lesion of face  He   Patient Active Problem List    Diagnosis Date Noted    BMI 34 0-34 9,adult 06/07/2022    Skin lesion 06/07/2022    Atypical nevi 06/07/2022    Lipoma of left upper extremity- large 06/07/2022    Elevated bilirubin 08/01/2019    Welcome to Medicare preventive visit 04/05/2018    Basal cell carcinoma of face 12/21/2017    Mixed hyperlipidemia 12/04/2017    Allergic rhinitis 07/21/2016    Coronary artery disease with history of myocardial infarction without history of CABG 06/30/2015    Anxiety 10/18/2013    Benign essential hypertension 05/18/2012    Type 2 diabetes mellitus with hyperglycemia, without long-term current use of insulin ([facility] [address] ) 05/18/2012     He  has a past surgical history that includes Rotator cuff repair (2005)  His family history includes Diabetes unspecified in his mother; No Known Problems in his family and father  He  reports that he quit smoking about 32 years ago  His smoking use included cigarettes and cigars  He quit after 3 00 years of use  He has never used smokeless tobacco  He reports current alcohol use  He reports that he does not use drugs    Current Outpatient Medications   Medication Sig Dispense Refill    amLODIPine-benazepril (LOTREL) 10-20 MG per capsule Take 1 capsule by mouth daily 90 capsule 1    atorvastatin (LIPITOR) 40 mg tablet TAKE 1 TABLET BY MOUTH EVERY DAY 90 tablet 1    Empagliflozin (Jardiance) 10 MG TABS Take 1 tablet (10 mg total) by mouth daily 90 tablet 1    gabapentin (Neurontin) 100 mg capsule Take 1 capsule (100 mg total) by mouth daily at bedtime 60 capsule 1    glimepiride (AMARYL) 2 mg tablet Take 1 tablet with breakfast and 2 tablets with dinner 270 tablet 1    Insulin Pen Needle (NovoFine Plus Pen Needle) 32G X 4 MM MISC Use daily 100 each 1    Lancets (OneTouch Delica Plus [id number]) MISC Use 2 times a day 200 each 1    metFORMIN (GLUCOPHAGE) 1000 MG tablet Take 1 tablet (1,000 mg total) by mouth 2 (two) times a day 180 tablet 1    Naproxen Sodium (ALEVE PO) Take by mouth as needed      OneTouch Verio test strip Use 1 each 2 (two) times a day Use as instructed 200 each 1    traZODone (DESYREL) 50 mg tablet Take 1 tablet (50 mg total) by mouth daily at bedtime (Patient taking differently: Take 50 mg by mouth daily at bedtime As needed) 90 tablet 1    Victoza injection INJECT 1 8 MG UNDER THE SKIN ONCE DAILY 27 mL 1     No current facility-administered medications for this visit       Current Outpatient Medications on File Prior to Visit   Medication Sig    amLODIPine-benazepril (LOTREL) 10-20 MG per capsule Take 1 capsule by mouth daily    atorvastatin (LIPITOR) 40 mg tablet TAKE 1 TABLET BY MOUTH EVERY DAY    Empagliflozin (Jardiance) 10 MG TABS Take 1 tablet (10 mg total) by mouth daily    glimepiride (AMARYL) 2 mg tablet Take 1 tablet with breakfast and 2 tablets with dinner    Insulin Pen Needle (NovoFine Plus Pen Needle) 32G X 4 MM MISC Use daily    Lancets (OneTouch Delica Plus [id number]) MISC Use 2 times a day    metFORMIN (GLUCOPHAGE) 1000 MG tablet Take 1 tablet (1,000 mg total) by mouth 2 (two) times a day    Naproxen Sodium (ALEVE PO) Take by mouth as needed    OneTouch Verio test strip Use 1 each 2 (two) times a day Use as instructed    traZODone (DESYREL) 50 mg tablet Take 1 tablet (50 mg total) by mouth daily at bedtime (Patient taking differently: Take 50 mg by mouth daily at bedtime As needed)    Victoza injection INJECT 1 8 MG UNDER THE SKIN ONCE DAILY    [DISCONTINUED] Insulin Pen Needle (BD Pen Needle Manju 2nd Gen) 32G X 4 MM MISC Inject under the skin daily    [DISCONTINUED] rOPINIRole (REQUIP) 0 5 mg tablet Take 1 tablet (0 5 mg total) by mouth daily at bedtime (Patient not taking: No sig reported)     No current facility-administered medications on file prior to visit  He is allergic to [place] and [place] [sitagliptin]       Review of Systems   Constitutional: Negative for activity change, chills, fatigue, fever and unexpected weight change  HENT: Negative for ear pain, postnasal drip, rhinorrhea, sinus pressure and sore throat  Eyes: Positive for visual disturbance (L worse than R, has catarcts)  Negative for pain  Respiratory: Negative for cough, choking, chest tightness, shortness of breath and wheezing  Cardiovascular: Negative for chest pain, palpitations and leg swelling  Gastrointestinal: Negative for abdominal pain, constipation, diarrhea, nausea and vomiting  Endocrine: Positive for polydipsia (2/2 jardiance) and polyuria  Genitourinary: Negative for dysuria and hematuria  Musculoskeletal: Negative for arthralgias, back pain (denies pain but admits to stiffness of his back ), gait problem, joint swelling, myalgias and neck stiffness  Skin: Negative for pallor and rash  Neurological: Negative for dizziness, tremors, seizures, syncope, light-headedness and headaches  Hematological: Negative for adenopathy  Psychiatric/Behavioral: Negative for behavioral problems           Objective:      /72 (BP Location: Left arm, Patient Position: Sitting, Cuff Size: Large)   Pulse 90   Temp 98 3 °F (36 8 °C) (Temporal)   Ht 6' 1" (1 854 m)   Wt 118 kg (259 lb 8 oz)   SpO2 97%   BMI 34 24 kg/m²          Physical Exam  Constitutional:       General: He is not in acute distress  Appearance: He is well-developed  He is not diaphoretic  HENT:      Head: Normocephalic and atraumatic  Right Ear: External ear normal       Left Ear: External ear normal       Nose: Nose normal       Mouth/Throat:      Mouth: Mucous membranes are dry  Pharynx: Posterior oropharyngeal erythema (Oropharyngeal erythema with dry mucous membranes and Mallampati class for oropharynx) present  No oropharyngeal exudate  Eyes:      General: No scleral icterus  Right eye: No discharge  Left eye: No discharge  Conjunctiva/sclera: Conjunctivae normal       Pupils: Pupils are equal, round, and reactive to light  Neck:      Thyroid: No thyromegaly  Vascular: No JVD  Trachea: No tracheal deviation  Cardiovascular:      Rate and Rhythm: Normal rate and regular rhythm  Heart sounds: Normal heart sounds  No murmur heard  No friction rub  No gallop  Pulmonary:      Effort: Pulmonary effort is normal  No respiratory distress  Breath sounds: Normal breath sounds  No wheezing or rales  Chest:      Chest wall: No tenderness  Abdominal:      General: Bowel sounds are normal  There is no distension  Palpations: Abdomen is soft  There is no mass  Tenderness: There is no abdominal tenderness  There is no guarding or rebound  Musculoskeletal:         General: No tenderness or deformity  Arms:       Cervical back: Normal range of motion and neck supple  Thoracic back: Spasms present  Decreased range of motion  Lumbar back: Spasms present  Decreased range of motion ( patient range of motion of lumbar and thoracic back without tenderness but with positive paraspinal muscle hypertonicity)  Lymphadenopathy:      Cervical: No cervical adenopathy     Skin:     General: Skin is warm and dry  Coloration: Skin is not pale  Findings: Lesion ( hyperpigmented lesion/nevi on the medial aspect of the right 1st toe) present  No erythema or rash  Neurological:      Mental Status: He is alert and oriented to person, place, and time  Cranial Nerves: No cranial nerve deficit  Motor: No abnormal muscle tone  Coordination: Coordination normal       Deep Tendon Reflexes: Reflexes are normal and symmetric  Psychiatric:         Behavior: Behavior normal            Orders Only on 05/02/2022   Component Date Value Ref Range Status    Glucose, Random 05/02/2022 119 (A) 65 - 99 mg/dL Final    Comment:               Fasting reference interval     For someone without known diabetes, a glucose value  between 100 and 125 mg/dL is consistent with  prediabetes and should be confirmed with a  follow-up test          BUN 05/02/2022 18  7 - 25 mg/dL Final    Creatinine 05/02/2022 0 76  0 70 - 1 18 mg/dL Final    Comment: For patients >52years of age, the reference limit  for Creatinine is approximately 13% higher for people  identified as -American           eGFR Non  05/02/2022 92  > OR = 60 mL/min/1 73m2 Final    eGFR African American 05/02/2022 107  > OR = 60 mL/min/1 73m2 Final    SL AMB BUN/CREATININE RATIO 43/03/2893 NOT APPLICABLE  6 - 22 (calc) Final    Sodium 05/02/2022 140  135 - 146 mmol/L Final    Potassium 05/02/2022 3 8  3 5 - 5 3 mmol/L Final    Chloride 05/02/2022 103  98 - 110 mmol/L Final    CO2 05/02/2022 28  20 - 32 mmol/L Final    Calcium 05/02/2022 9 2  8 6 - 10 3 mg/dL Final    Hemoglobin A1C 05/02/2022 6 8 (A) <5 7 % of total Hgb Final    Comment: For someone without known diabetes, a hemoglobin A1c  value of 6 5% or greater indicates that they may have   diabetes and this should be confirmed with a follow-up   test      For someone with known diabetes, a value <7% indicates   that their diabetes is well controlled and a value greater than or equal to 7% indicates suboptimal   control  A1c targets should be individualized based on   duration of diabetes, age, comorbid conditions, and   other considerations  Currently, no consensus exists regarding use of  hemoglobin A1c for diagnosis of diabetes for children  Orders Only on 05/02/2022   Component Date Value Ref Range Status    Prostate Specific Antigen Total 05/02/2022 2 79  < OR = 4 00 ng/mL Final    Comment: The total PSA value from this assay system is   standardized against the WHO standard  The test   result will be approximately 20% lower when compared   to the equimolar-standardized total PSA ([place]   [place])  Comparison of serial PSA results should be   interpreted with this fact in mind  This test was performed using the Siemens   chemiluminescent method  Values obtained from   different assay methods cannot be used  interchangeably  PSA levels, regardless of  value, should not be interpreted as absolute  evidence of the presence or absence of disease  Orders Only on 12/07/2021   Component Date Value Ref Range Status    Total Cholesterol 12/07/2021 94  <200 mg/dL Final    HDL 12/07/2021 30 (A) > OR = 40 mg/dL Final    Triglycerides 12/07/2021 95  <150 mg/dL Final    LDL Calculated 12/07/2021 46  mg/dL (calc) Final    Comment: Reference range: <100     Desirable range <100 mg/dL for primary prevention;    <70 mg/dL for patients with CHD or diabetic patients   with > or = 2 CHD risk factors  LDL-C is now calculated using the Luis-Alvarez   calculation, which is a validated novel method providing   better accuracy than the Friedewald equation in the   estimation of LDL-C  Mevelyn Dakins et al Benna Bride  5919;871(90): 3657-5622   (http://Qoniac/faq/BUR525)      Chol HDLC Ratio 12/07/2021 3 1  <5 0 (calc) Final    Non-HDL Cholesterol 12/07/2021 64  <130 mg/dL (calc) Final    Comment: For patients with diabetes plus 1 major ASCVD risk   factor, treating to a non-HDL-C goal of <100 mg/dL   (LDL-C of <70 mg/dL) is considered a therapeutic   option   Glucose, Random 12/07/2021 151 (A) 65 - 99 mg/dL Final    Comment:               Fasting reference interval     For someone without known diabetes, a glucose  value >125 mg/dL indicates that they may have  diabetes and this should be confirmed with a  follow-up test          BUN 12/07/2021 15  7 - 25 mg/dL Final    Creatinine 12/07/2021 0 69 (A) 0 70 - 1 18 mg/dL Final    Comment: For patients >52years of age, the reference limit  for Creatinine is approximately 13% higher for people  identified as -American           eGFR Non  12/07/2021 96  > OR = 60 mL/min/1 73m2 Final    eGFR  12/07/2021 111  > OR = 60 mL/min/1 73m2 Final    SL AMB BUN/CREATININE RATIO 12/07/2021 22  6 - 22 (calc) Final    Sodium 12/07/2021 141  135 - 146 mmol/L Final    Potassium 12/07/2021 3 7  3 5 - 5 3 mmol/L Final    Chloride 12/07/2021 108  98 - 110 mmol/L Final    CO2 12/07/2021 27  20 - 32 mmol/L Final    Calcium 12/07/2021 8 6  8 6 - 10 3 mg/dL Final    Protein, Total 12/07/2021 6 6  6 1 - 8 1 g/dL Final    Albumin 12/07/2021 3 9  3 6 - 5 1 g/dL Final    Globulin 12/07/2021 2 7  1 9 - 3 7 g/dL (calc) Final    Albumin/Globulin Ratio 12/07/2021 1 4  1 0 - 2 5 (calc) Final    TOTAL BILIRUBIN 12/07/2021 1 4 (A) 0 2 - 1 2 mg/dL Final    Alkaline Phosphatase 12/07/2021 55  35 - 144 U/L Final    AST 12/07/2021 19  10 - 35 U/L Final    ALT 12/07/2021 25  9 - 46 U/L Final    Hemoglobin A1C 12/07/2021 6 6 (A) <5 7 % of total Hgb Final    Comment: For someone without known diabetes, a hemoglobin A1c  value of 6 5% or greater indicates that they may have   diabetes and this should be confirmed with a follow-up   test      For someone with known diabetes, a value <7% indicates   that their diabetes is well controlled and a value   greater than or equal to 7% indicates suboptimal   control  A1c targets should be individualized based on   duration of diabetes, age, comorbid conditions, and   other considerations  Currently, no consensus exists regarding use of  hemoglobin A1c for diagnosis of diabetes for children  Orders Only on 07/26/2021   Component Date Value Ref Range Status    Glucose, Random 07/26/2021 163 (A) 65 - 99 mg/dL Final    Comment:               Fasting reference interval     For someone without known diabetes, a glucose  value >125 mg/dL indicates that they may have  diabetes and this should be confirmed with a  follow-up test          BUN 07/26/2021 25  7 - 25 mg/dL Final    Creatinine 07/26/2021 0 85  0 70 - 1 18 mg/dL Final    Comment: For patients >52years of age, the reference limit  for Creatinine is approximately 13% higher for people  identified as -American   eGFR Non  07/26/2021 88  > OR = 60 mL/min/1 73m2 Final    eGFR  07/26/2021 102  > OR = 60 mL/min/1 73m2 Final    SL AMB BUN/CREATININE RATIO 30/94/7136 NOT APPLICABLE  6 - 22 (calc) Final    Sodium 07/26/2021 138  135 - 146 mmol/L Final    Potassium 07/26/2021 4 6  3 5 - 5 3 mmol/L Final    Chloride 07/26/2021 104  98 - 110 mmol/L Final    CO2 07/26/2021 25  20 - 32 mmol/L Final    Calcium 07/26/2021 9 2  8 6 - 10 3 mg/dL Final    Hemoglobin A1C 07/26/2021 6 8 (A) <5 7 % of total Hgb Final    Comment: For someone without known diabetes, a hemoglobin A1c  value of 6 5% or greater indicates that they may have   diabetes and this should be confirmed with a follow-up   test      For someone with known diabetes, a value <7% indicates   that their diabetes is well controlled and a value   greater than or equal to 7% indicates suboptimal   control  A1c targets should be individualized based on   duration of diabetes, age, comorbid conditions, and   other considerations       Currently, no consensus exists regarding use of  hemoglobin A1c for diagnosis of diabetes for children

## 2022-06-07 NOTE — PATIENT INSTRUCTIONS
Hypoglycemia instructions   [facility]  6/7/2022  [id number]    Low Blood Sugar    Steps to treat low blood sugar  1  Test blood sugar if you have symptoms of low blood sugar:   Low Blood Sugar Symptoms:  o Sweaty  o Dizzy  o Rapid heartbeat  o Shaky  o Bad mood  o Hungry      2  Treat blood sugar less than 70 with 15 grams of fast-acting carbohydrate:   Examples of 15 grams Fast-Acting Carbohydrate:  o 4 oz juice  o 4 oz regular soda  o 3-4 glucose tablets (chew)  o 3-4 hard candies (chew)          3  Wait 15 minutes and test your blood sugar again     4   If blood sugar is less than 100, repeat steps 2-3     5  When your blood sugar is 100 or more, eat a snack if it will be longer than one hour until your next meal  The snack should be 15 grams of carbohydrate and a protein:   Examples of snacks:  o ½ sandwich  o 6 crackers with cheese  o Piece of fruit with cheese or peanut butter  o 6 crackers with peanut butter

## 2022-06-07 NOTE — PROGRESS NOTES
Diabetic Foot Exam    Patient's shoes and socks removed  Right Foot/Ankle   Right Foot Inspection  Skin Exam: dry skin, callus and callus  Toe Exam: No swelling, no tenderness, erythema and  no right toe deformity    Sensory   Proprioception: intact  Monofilament testing: intact    Vascular  The right DP pulse is 2+  The right PT pulse is 2+  Left Foot/Ankle  Left Foot Inspection  Skin Exam: dry skin and callus  Toe Exam: No swelling, no tenderness, no erythema and no left toe deformity  Sensory   Proprioception: intact  Monofilament testing: intact    Vascular  The left DP pulse is 2+  The left PT pulse is 2+       Assign Risk Category  No deformity present  No loss of protective sensation  No weak pulses  Risk: 0

## 2022-08-10 ENCOUNTER — TELEPHONE (OUTPATIENT)
Dept: INTERNAL MEDICINE CLINIC | Age: 72
End: 2022-08-10

## 2022-08-10 DIAGNOSIS — G62.9 NEUROPATHY: ICD-10-CM

## 2022-08-10 DIAGNOSIS — E11.65 TYPE 2 DIABETES MELLITUS WITH HYPERGLYCEMIA, WITHOUT LONG-TERM CURRENT USE OF INSULIN (HCC): ICD-10-CM

## 2022-08-10 RX ORDER — GABAPENTIN 100 MG/1
100 CAPSULE ORAL 2 TIMES DAILY
Qty: 180 CAPSULE | Refills: 1 | Status: SHIPPED | OUTPATIENT
Start: 2022-08-10 | End: 2022-12-13

## 2022-08-10 NOTE — TELEPHONE ENCOUNTER
Pt  Called in for refill for Gabapentin 100 mg  Pt  Has been taking 2 tablets daily and has been helping  Pt  Is asking if we can change script to Gabapentin 100 mg BID      Pt  Uses [facility] on [address]    Thank you

## 2022-09-12 ENCOUNTER — TELEPHONE (OUTPATIENT)
Dept: ENDOCRINOLOGY | Facility: CLINIC | Age: 72
End: 2022-09-12

## 2022-09-15 LAB
LEFT EYE DIABETIC RETINOPATHY: NORMAL
RIGHT EYE DIABETIC RETINOPATHY: NORMAL

## 2022-10-29 DIAGNOSIS — E11.8 TYPE 2 DIABETES MELLITUS WITH COMPLICATION, WITH LONG-TERM CURRENT USE OF INSULIN (HCC): ICD-10-CM

## 2022-10-29 DIAGNOSIS — Z79.4 TYPE 2 DIABETES MELLITUS WITH COMPLICATION, WITH LONG-TERM CURRENT USE OF INSULIN (HCC): ICD-10-CM

## 2022-10-31 RX ORDER — LIRAGLUTIDE 6 MG/ML
INJECTION SUBCUTANEOUS
Qty: 18 ML | Refills: 5 | Status: SHIPPED | OUTPATIENT
Start: 2022-10-31

## 2022-11-07 ENCOUNTER — TELEPHONE (OUTPATIENT)
Dept: INTERNAL MEDICINE CLINIC | Age: 72
End: 2022-11-07

## 2022-11-07 DIAGNOSIS — I10 ESSENTIAL HYPERTENSION: ICD-10-CM

## 2022-11-07 DIAGNOSIS — E11.65 TYPE 2 DIABETES MELLITUS WITH HYPERGLYCEMIA, WITHOUT LONG-TERM CURRENT USE OF INSULIN (HCC): ICD-10-CM

## 2022-11-07 DIAGNOSIS — E11.8 TYPE 2 DIABETES MELLITUS WITH COMPLICATION (HCC): ICD-10-CM

## 2022-11-07 DIAGNOSIS — E78.2 MIXED HYPERLIPIDEMIA: ICD-10-CM

## 2022-11-07 LAB
BASOPHILS # BLD AUTO: 42 CELLS/UL (ref 0–200)
BASOPHILS NFR BLD AUTO: 0.8 %
EOSINOPHIL # BLD AUTO: 212 CELLS/UL (ref 15–500)
EOSINOPHIL NFR BLD AUTO: 4 %
ERYTHROCYTE [DISTWIDTH] IN BLOOD BY AUTOMATED COUNT: 13 % (ref 11–15)
FERRITIN SERPL-MCNC: 184 NG/ML (ref 24–380)
HCT VFR BLD AUTO: 51.6 % (ref 38.5–50)
HGB BLD-MCNC: 17.7 G/DL (ref 13.2–17.1)
LYMPHOCYTES # BLD AUTO: 1500 CELLS/UL (ref 850–3900)
LYMPHOCYTES NFR BLD AUTO: 28.3 %
MCH RBC QN AUTO: 31.7 PG (ref 27–33)
MCHC RBC AUTO-ENTMCNC: 34.3 G/DL (ref 32–36)
MCV RBC AUTO: 92.5 FL (ref 80–100)
MONOCYTES # BLD AUTO: 504 CELLS/UL (ref 200–950)
MONOCYTES NFR BLD AUTO: 9.5 %
NEUTROPHILS # BLD AUTO: 3042 CELLS/UL (ref 1500–7800)
NEUTROPHILS NFR BLD AUTO: 57.4 %
PLATELET # BLD AUTO: 154 THOUSAND/UL (ref 140–400)
PMV BLD REES-ECKER: 9.6 FL (ref 7.5–12.5)
RBC # BLD AUTO: 5.58 MILLION/UL (ref 4.2–5.8)
WBC # BLD AUTO: 5.3 THOUSAND/UL (ref 3.8–10.8)

## 2022-11-07 RX ORDER — EMPAGLIFLOZIN 10 MG/1
TABLET, FILM COATED ORAL
Qty: 90 TABLET | Refills: 1 | Status: SHIPPED | OUTPATIENT
Start: 2022-11-07

## 2022-11-07 RX ORDER — GLIMEPIRIDE 2 MG/1
TABLET ORAL
Qty: 270 TABLET | Refills: 1 | Status: SHIPPED | OUTPATIENT
Start: 2022-11-07

## 2022-11-07 RX ORDER — ATORVASTATIN CALCIUM 40 MG/1
TABLET, FILM COATED ORAL
Qty: 90 TABLET | Refills: 1 | Status: SHIPPED | OUTPATIENT
Start: 2022-11-07

## 2022-11-07 RX ORDER — AMLODIPINE BESYLATE AND BENAZEPRIL HYDROCHLORIDE 10; 20 MG/1; MG/1
1 CAPSULE ORAL DAILY
Qty: 90 CAPSULE | Refills: 1 | Status: SHIPPED | OUTPATIENT
Start: 2022-11-07

## 2022-11-08 LAB
ALBUMIN/CREAT UR: 12 MCG/MG CREAT
BUN SERPL-MCNC: 13 MG/DL (ref 7–25)
BUN/CREAT SERPL: ABNORMAL (CALC) (ref 6–22)
CALCIUM SERPL-MCNC: 9.4 MG/DL (ref 8.6–10.3)
CHLORIDE SERPL-SCNC: 103 MMOL/L (ref 98–110)
CO2 SERPL-SCNC: 26 MMOL/L (ref 20–32)
CREAT SERPL-MCNC: 0.76 MG/DL (ref 0.7–1.28)
CREAT UR-MCNC: 50 MG/DL (ref 20–320)
GFR/BSA.PRED SERPLBLD CYS-BASED-ARV: 96 ML/MIN/1.73M2
GLUCOSE SERPL-MCNC: 136 MG/DL (ref 65–99)
HBA1C MFR BLD: 7.4 % OF TOTAL HGB
MICROALBUMIN UR-MCNC: 0.6 MG/DL
POTASSIUM SERPL-SCNC: 3.9 MMOL/L (ref 3.5–5.3)
SODIUM SERPL-SCNC: 138 MMOL/L (ref 135–146)

## 2022-11-09 ENCOUNTER — TELEPHONE (OUTPATIENT)
Dept: ENDOCRINOLOGY | Facility: CLINIC | Age: 72
End: 2022-11-09

## 2022-11-09 NOTE — TELEPHONE ENCOUNTER
----- Message from Abdiel Murry PA-C sent at 11/8/2022  9:00 AM EST -----  Please call the patient regarding his abnormal result  Microalbumin creatinine ratio is normal  Glucose is 136 and A1C increased to 7 4%   Continue current treatment, follow diabetic diet  Kidney function is normal

## 2022-12-13 ENCOUNTER — TELEPHONE (OUTPATIENT)
Dept: INTERNAL MEDICINE CLINIC | Age: 72
End: 2022-12-13

## 2022-12-13 ENCOUNTER — OFFICE VISIT (OUTPATIENT)
Dept: INTERNAL MEDICINE CLINIC | Age: 72
End: 2022-12-13

## 2022-12-13 VITALS
DIASTOLIC BLOOD PRESSURE: 76 MMHG | TEMPERATURE: 98.4 F | HEIGHT: 74 IN | BODY MASS INDEX: 34.01 KG/M2 | OXYGEN SATURATION: 98 % | WEIGHT: 265 LBS | HEART RATE: 86 BPM | SYSTOLIC BLOOD PRESSURE: 160 MMHG

## 2022-12-13 DIAGNOSIS — L98.9 LESION OF SKIN OF SCALP: ICD-10-CM

## 2022-12-13 DIAGNOSIS — E11.65 TYPE 2 DIABETES MELLITUS WITH HYPERGLYCEMIA, WITHOUT LONG-TERM CURRENT USE OF INSULIN (HCC): ICD-10-CM

## 2022-12-13 DIAGNOSIS — Z00.00 MEDICARE ANNUAL WELLNESS VISIT, SUBSEQUENT: ICD-10-CM

## 2022-12-13 DIAGNOSIS — Z12.12 ENCOUNTER FOR COLORECTAL CANCER SCREENING: ICD-10-CM

## 2022-12-13 DIAGNOSIS — G62.9 NEUROPATHY: Primary | ICD-10-CM

## 2022-12-13 DIAGNOSIS — I10 BENIGN ESSENTIAL HYPERTENSION: ICD-10-CM

## 2022-12-13 DIAGNOSIS — Z12.11 ENCOUNTER FOR COLORECTAL CANCER SCREENING: ICD-10-CM

## 2022-12-13 DIAGNOSIS — M54.50 LUMBAR PAIN: ICD-10-CM

## 2022-12-13 RX ORDER — GABAPENTIN 300 MG/1
300 CAPSULE ORAL
Qty: 90 CAPSULE | Refills: 1
Start: 2022-12-13

## 2022-12-13 NOTE — PROGRESS NOTES
Assessment and Plan:     Problem List Items Addressed This Visit        Endocrine    Type 2 diabetes mellitus with hyperglycemia, without long-term current use of insulin (HCC)    Relevant Medications    gabapentin (Neurontin) 300 mg capsule       Cardiovascular and Mediastinum    Benign essential hypertension   Other Visit Diagnoses     Neuropathy    -  Primary    increase gabapentin to 300mg qhs     Relevant Medications    gabapentin (Neurontin) 300 mg capsule    Encounter for colorectal cancer screening        Relevant Orders    Cologuard    Medicare annual wellness visit, subsequent        Lumbar pain        recommend xray and PT   pt will consider     Relevant Orders    XR spine lumbar minimum 4 views non injury    Lesion of skin of scalp        Relevant Orders    Ambulatory Referral to Dermatology           Preventive health issues were discussed with patient, and age appropriate screening tests were ordered as noted in patient's After Visit Summary  Personalized health advice and appropriate referrals for health education or preventive services given if needed, as noted in patient's After Visit Summary  History of Present Illness:     Patient presents for a Medicare Wellness Visit    72 y/o male with hx of HTN, HLD, DM, CAD presents for f/u and AWV    Pt follows with endocrine but states he is changing doctors   His most recent labs done in November   Has apt today at 830am  Hga1c 7 4     Pt reports scalp itching and bumps which he noted over the past year  He has not seen dermatology for quite some time          Patient Care Team:  Jose Alfredo Prasad MD as PCP - [address] [place] [address],[address] [facility] ([facility])  Golden Robison MD as PCP - [facility] ([facility])  Abram Farooq OD (Optometry)  Adiel Toney MD (Endocrinology)  Katharina Mendoza MD (Endocrinology)     Review of Systems:     Review of Systems   Constitutional: Negative for activity change, appetite change, chills, diaphoresis, fatigue and fever     HENT: Negative for congestion and sore throat  Eyes: Positive for visual disturbance (states he has cataracts and needs surgery )  Negative for pain  Respiratory: Negative for cough, shortness of breath and wheezing  Cardiovascular: Negative for chest pain  Gastrointestinal: Negative for abdominal pain, constipation, diarrhea and nausea  Genitourinary: Negative for dysuria and urgency  Musculoskeletal: Negative for arthralgias, back pain and gait problem  Skin: Positive for rash (on scalp )  Neurological: Negative for dizziness, weakness and headaches  Hematological: Does not bruise/bleed easily  Psychiatric/Behavioral: Positive for sleep disturbance  The patient is not nervous/anxious           Problem List:     Patient Active Problem List   Diagnosis   • Allergic rhinitis   • Anxiety   • Basal cell carcinoma of face   • Benign essential hypertension   • Coronary artery disease with history of myocardial infarction without history of CABG   • Mixed hyperlipidemia   • Type 2 diabetes mellitus with hyperglycemia, without long-term current use of insulin (HCC)   • Welcome to Medicare preventive visit   • Elevated bilirubin   • BMI 34 0-34 9,adult   • Skin lesion   • Atypical nevi   • Lipoma of left upper extremity- large      Past Medical and Surgical History:     Past Medical History:   Diagnosis Date   • Cellulitis     last assessed 7/3/13   • Diabetes ([address] )     last assessed 4/10/13   • Gastroenteritis     last assessed 3/30/17   • High cholesterol    • Hypertension    • Rhus dermatitis     last assessed 8/26/15   • Skin lesion of face     last assessed 3/30/17     Past Surgical History:   Procedure Laterality Date   • ROTATOR CUFF REPAIR  2005      Family History:     Family History   Problem Relation Age of Onset   • No Known Problems Family    • Diabetes unspecified Mother    • No Known Problems Father       Social History:     Social History     Socioeconomic History   • Marital status: Single Spouse name: None   • Number of children: None   • Years of education: None   • Highest education level: None   Occupational History   • None   Tobacco Use   • Smoking status: Former     Years: 3 00     Types: Cigarettes, Cigars     Start date: 37     Quit date: 1989     Years since quittin 0   • Smokeless tobacco: Never   Vaping Use   • Vaping Use: Never used   Substance and Sexual Activity   • Alcohol use: Yes     Comment: rare- 1 if out to eat- rarely    • Drug use: No   • Sexual activity: Not Currently   Other Topics Concern   • None   Social History Narrative   • None     Social Determinants of Health     Financial Resource Strain: High Risk   • Difficulty of Paying Living Expenses: Hard   Food Insecurity: Not on file   Transportation Needs: No Transportation Needs   • Lack of Transportation (Medical): No   • Lack of Transportation (Non-Medical):  No   Physical Activity: Not on file   Stress: Not on file   Social Connections: Not on file   Intimate Partner Violence: Not on file   Housing Stability: Not on file      Medications and Allergies:     Current Outpatient Medications   Medication Sig Dispense Refill   • amLODIPine-benazepril (LOTREL) 10-20 MG per capsule Take 1 capsule by mouth daily 90 capsule 1   • atorvastatin (LIPITOR) 40 mg tablet TAKE 1 TABLET BY MOUTH EVERY DAY 90 tablet 1   • gabapentin (Neurontin) 300 mg capsule Take 1 capsule (300 mg total) by mouth daily at bedtime 90 capsule 1   • glimepiride (AMARYL) 2 mg tablet TAKE 1 TABLET WITH BREAKFAST AND 2 TABLETS WITH DINNER 270 tablet 1   • Insulin Pen Needle (NovoFine Plus Pen Needle) 32G X 4 MM MISC Use daily 100 each 1   • Jardiance 10 MG TABS tablet TAKE 1 TABLET BY MOUTH EVERY DAY 90 tablet 1   • Lancets (OneTouch Delica Plus [id number]) MISC Use 2 times a day 200 each 1   • metFORMIN (GLUCOPHAGE) 1000 MG tablet TAKE 1 TABLET BY MOUTH TWICE A  tablet 1   • Naproxen Sodium (ALEVE PO) Take by mouth as needed     • OneTouch Verio test strip Use 1 each 2 (two) times a day Use as instructed 200 each 1   • Victoza injection INJECT 1 8 MG UNDER THE SKIN ONCE DAILY 18 mL 5     No current facility-administered medications for this visit  Allergies   Allergen Reactions   • Januvia [Sitagliptin] Other (See Comments)     Unknown reaction, patient can't recall      Immunizations:     Immunization History   Administered Date(s) Administered   • COVID-19 PFIZER VACCINE 0 3 ML IM 04/28/2021, 05/19/2021      Health Maintenance:         Topic Date Due   • Colorectal Cancer Screening  01/06/2023   • Hepatitis C Screening  Completed         Topic Date Due   • Hepatitis B Vaccine (1 of 3 - 3-dose series) Never done   • Pneumococcal Vaccine: 65+ Years (1 - PCV) Never done   • COVID-19 Vaccine (3 - Booster for Pfizer series) 10/19/2021   • Influenza Vaccine (1) Never done      Medicare Screening Tests and Risk Assessments:     Rachel Portillo is here for his Subsequent Wellness visit  Health Risk Assessment:   Patient rates overall health as good  Patient feels that their physical health rating is same  Patient is satisfied with their life  Eyesight was rated as slightly worse  Hearing was rated as same  Patient feels that their emotional and mental health rating is same  Patients states they are never, rarely angry  Patient states they are sometimes unusually tired/fatigued  Pain experienced in the last 7 days has been some  Patient's pain rating has been 4/10  Patient states that he has experienced no weight loss or gain in last 6 months  Depression Screening:   PHQ-2 Score: 0      Fall Risk Screening: In the past year, patient has experienced: no history of falling in past year      Home Safety:  Patient does not have trouble with stairs inside or outside of their home  Patient has working smoke alarms and has working carbon monoxide detector  Home safety hazards include: none       Nutrition:   Current diet is Diabetic, Low Carb and Limited junk food      Medications:   Patient is currently taking over-the-counter supplements  OTC medications include: see medication list  Patient is able to manage medications  Activities of Daily Living (ADLs)/Instrumental Activities of Daily Living (IADLs):   Walk and transfer into and out of bed and chair?: Yes  Dress and groom yourself?: Yes    Bathe or shower yourself?: Yes    Feed yourself? Yes  Do your laundry/housekeeping?: Yes  Manage your money, pay your bills and track your expenses?: Yes  Make your own meals?: Yes    Do your own shopping?: Yes    Previous Hospitalizations:   Any hospitalizations or ED visits within the last 12 months?: No      Advance Care Planning:   Living will: No    Advanced directive: No    Five wishes given: No      Cognitive Screening:   Provider or family/friend/caregiver concerned regarding cognition?: No    PREVENTIVE SCREENINGS      Cardiovascular Screening:    General: Screening Not Indicated and History Lipid Disorder      Diabetes Screening:     General: Screening Not Indicated and History Diabetes      Colorectal Cancer Screening:     General: Screening Current    Due for: Cologuard      Prostate Cancer Screening:    General: Screening Current      Osteoporosis Screening:    General: Screening Not Indicated      Abdominal Aortic Aneurysm (AAA) Screening:    Risk factors include: age between 73-69 yo and tobacco use        General: Risks and Benefits Discussed      Lung Cancer Screening:     General: Screening Not Indicated      Hepatitis C Screening:    General: Screening Current      Preventive Screening Comments: Recommend pt schedule aorta US    Screening, Brief Intervention, and Referral to Treatment (SBIRT)    Screening  Typical number of drinks in a day: 0  Typical number of drinks in a week: 0  Interpretation: Low risk drinking behavior  No results found       Physical Exam:     /76 (BP Location: Left arm, Patient Position: Sitting, Cuff Size: Standard)   Pulse 86   Temp 98 4 °F (36 9 °C) (Temporal)   Ht 6' 1 5" (1 867 m)   Wt 120 kg (265 lb)   SpO2 98%   BMI 34 48 kg/m²     Physical Exam  Vitals reviewed  Constitutional:       Appearance: He is obese  HENT:      Head: Normocephalic and atraumatic  Right Ear: Tympanic membrane, ear canal and external ear normal  There is no impacted cerumen  Left Ear: Tympanic membrane, ear canal and external ear normal  There is no impacted cerumen  Nose: Nose normal    Eyes:      General:         Right eye: No discharge  Left eye: No discharge  Extraocular Movements: Extraocular movements intact  Conjunctiva/sclera: Conjunctivae normal       Pupils: Pupils are equal, round, and reactive to light  Cardiovascular:      Rate and Rhythm: Normal rate and regular rhythm  Pulmonary:      Effort: Pulmonary effort is normal  No respiratory distress  Breath sounds: Normal breath sounds  No wheezing, rhonchi or rales  Musculoskeletal:         General: No tenderness  Normal range of motion  Cervical back: Normal range of motion  Right lower leg: No edema  Left lower leg: No edema  Skin:     General: Skin is warm  Findings: Erythema (actinic keratosis on scalp ) present  No bruising  Neurological:      General: No focal deficit present  Mental Status: He is alert and oriented to person, place, and time     Psychiatric:         Mood and Affect: Mood normal          Behavior: Behavior normal           Leoncio TRACI Howard

## 2022-12-13 NOTE — TELEPHONE ENCOUNTER
Pt went to his new diabetes doctor and they told him that his sugars are so good, that he doesn't have to f/u with them as much  He does have to go back in April to be checked again  Pt is curious that if the diabetes doctor wont fill his diabetic meds, would you still do it? He is also going to ask the doctor in April if they will anyway

## 2022-12-13 NOTE — PATIENT INSTRUCTIONS
Medicare Preventive Visit Patient Instructions  Thank you for completing your Welcome to Medicare Visit or Medicare Annual Wellness Visit today  Your next wellness visit will be due in one year (12/14/2023)  The screening/preventive services that you may require over the next 5-10 years are detailed below  Some tests may not apply to you based off risk factors and/or age  Screening tests ordered at today's visit but not completed yet may show as past due  Also, please note that scanned in results may not display below  Preventive Screenings:  Service Recommendations Previous Testing/Comments   Colorectal Cancer Screening  · Colonoscopy    · Fecal Occult Blood Test (FOBT)/Fecal Immunochemical Test (FIT)  · Fecal DNA/Cologuard Test  · Flexible Sigmoidoscopy Age: 39-70 years old   Colonoscopy: every 10 years (May be performed more frequently if at higher risk)  OR  FOBT/FIT: every 1 year  OR  Cologuard: every 3 years  OR  Sigmoidoscopy: every 5 years  Screening may be recommended earlier than age 39 if at higher risk for colorectal cancer  Also, an individualized decision between you and your healthcare provider will decide whether screening between the ages of 74-80 would be appropriate   Colonoscopy: 01/06/2020  FOBT/FIT: Not on file  Cologuard: 01/06/2020  Sigmoidoscopy: Not on file    Screening Current     Prostate Cancer Screening Individualized decision between patient and health care provider in men between ages of 53-78   Medicare will cover every 12 months beginning on the day after your 50th birthday PSA: 2 79 ng/mL     Screening Current     Hepatitis C Screening Once for adults born between 1945 and 1965  More frequently in patients at high risk for Hepatitis C Hep C Antibody: 07/29/2019    Screening Current   Diabetes Screening 1-2 times per year if you're at risk for diabetes or have pre-diabetes Fasting glucose: No results in last 5 years (No results in last 5 years)  A1C: 7 4 % of total Hgb (11/7/2022)  Screening Not Indicated  History Diabetes   Cholesterol Screening Once every 5 years if you don't have a lipid disorder  May order more often based on risk factors  Lipid panel: 12/07/2021  Screening Not Indicated  History Lipid Disorder      Other Preventive Screenings Covered by Medicare:  1  Abdominal Aortic Aneurysm (AAA) Screening: covered once if your at risk  You're considered to be at risk if you have a family history of AAA or a male between the age of 73-68 who smoking at least 100 cigarettes in your lifetime  2  Lung Cancer Screening: covers low dose CT scan once per year if you meet all of the following conditions: (1) Age 50-69; (2) No signs or symptoms of lung cancer; (3) Current smoker or have quit smoking within the last 15 years; (4) You have a tobacco smoking history of at least 20 pack years (packs per day x number of years you smoked); (5) You get a written order from a healthcare provider  3  Glaucoma Screening: covered annually if you're considered high risk: (1) You have diabetes OR (2) Family history of glaucoma OR (3)  aged 48 and older OR (3)  American aged 72 and older  3  Osteoporosis Screening: covered every 2 years if you meet one of the following conditions: (1) Have a vertebral abnormality; (2) On glucocorticoid therapy for more than 3 months; (3) Have primary hyperparathyroidism; (4) On osteoporosis medications and need to assess response to drug therapy  5  HIV Screening: covered annually if you're between the age of 12-76  Also covered annually if you are younger than 13 and older than 72 with risk factors for HIV infection  For pregnant patients, it is covered up to 3 times per pregnancy      Immunizations:  Immunization Recommendations   Influenza Vaccine Annual influenza vaccination during flu season is recommended for all persons aged >= 6 months who do not have contraindications   Pneumococcal Vaccine   * Pneumococcal conjugate vaccine = PCV13 (Prevnar 13), PCV15 (Vaxneuvance), PCV20 (Prevnar 20)  * Pneumococcal polysaccharide vaccine = PPSV23 (Pneumovax) Adults 2364 years old: 1-3 doses may be recommended based on certain risk factors  Adults 72 years old: 1-2 doses may be recommended based off what pneumonia vaccine you previously received   Hepatitis B Vaccine 3 dose series if at intermediate or high risk (ex: diabetes, end stage renal disease, liver disease)   Tetanus (Td) Vaccine - COST NOT COVERED BY MEDICARE PART B Following completion of primary series, a booster dose should be given every 10 years to maintain immunity against tetanus  Td may also be given as tetanus wound prophylaxis  Tdap Vaccine - COST NOT COVERED BY MEDICARE PART B Recommended at least once for all adults  For pregnant patients, recommended with each pregnancy  Shingles Vaccine (Shingrix) - COST NOT COVERED BY MEDICARE PART B  2 shot series recommended in those aged 48 and above     Health Maintenance Due:      Topic Date Due   • Colorectal Cancer Screening  01/06/2023   • Hepatitis C Screening  Completed     Immunizations Due:      Topic Date Due   • Hepatitis B Vaccine (1 of 3 - 3-dose series) Never done   • Pneumococcal Vaccine: 65+ Years (1 - PCV) Never done   • COVID-19 Vaccine (3 - Booster for Pfizer series) 10/19/2021   • Influenza Vaccine (1) Never done     Advance Directives   What are advance directives? Advance directives are legal documents that state your wishes and plans for medical care  These plans are made ahead of time in case you lose your ability to make decisions for yourself  Advance directives can apply to any medical decision, such as the treatments you want, and if you want to donate organs  What are the types of advance directives? There are many types of advance directives, and each state has rules about how to use them  You may choose a combination of any of the following:  · Living will:   This is a written record of the treatment you want  You can also choose which treatments you do not want, which to limit, and which to stop at a certain time  This includes surgery, medicine, IV fluid, and tube feedings  · Durable power of  for healthcare [place] [facility]): This is a written record that states who you want to make healthcare choices for you when you are unable to make them for yourself  This person, called a proxy, is usually a family member or a friend  You may choose more than 1 proxy  · Do not resuscitate (DNR) order:  A DNR order is used in case your heart stops beating or you stop breathing  It is a request not to have certain forms of treatment, such as CPR  A DNR order may be included in other types of advance directives  · Medical directive: This covers the care that you want if you are in a coma, near death, or unable to make decisions for yourself  You can list the treatments you want for each condition  Treatment may include pain medicine, surgery, blood transfusions, dialysis, IV or tube feedings, and a ventilator (breathing machine)  · Values history: This document has questions about your views, beliefs, and how you feel and think about life  This information can help others choose the care that you would choose  Why are advance directives important? An advance directive helps you control your care  Although spoken wishes may be used, it is better to have your wishes written down  Spoken wishes can be misunderstood, or not followed  Treatments may be given even if you do not want them  An advance directive may make it easier for your family to make difficult choices about your care  Weight Management   Why it is important to manage your weight:  Being overweight increases your risk of health conditions such as heart disease, high blood pressure, type 2 diabetes, and certain types of cancer  It can also increase your risk for osteoarthritis, sleep apnea, and other respiratory problems   Aim for a slow, steady weight loss  Even a small amount of weight loss can lower your risk of health problems  How to lose weight safely:  A safe and healthy way to lose weight is to eat fewer calories and get regular exercise  You can lose up about 1 pound a week by decreasing the number of calories you eat by 500 calories each day  Healthy meal plan for weight management:  A healthy meal plan includes a variety of foods, contains fewer calories, and helps you stay healthy  A healthy meal plan includes the following:  · Eat whole-grain foods more often  A healthy meal plan should contain fiber  Fiber is the part of grains, fruits, and vegetables that is not broken down by your body  Whole-grain foods are healthy and provide extra fiber in your diet  Some examples of whole-grain foods are whole-wheat breads and pastas, oatmeal, brown rice, and bulgur  · Eat a variety of vegetables every day  Include dark, leafy greens such as spinach, kale, connie greens, and mustard greens  Eat yellow and orange vegetables such as carrots, sweet potatoes, and winter squash  · Eat a variety of fruits every day  Choose fresh or canned fruit (canned in its own juice or light syrup) instead of juice  Fruit juice has very little or no fiber  · Eat low-fat dairy foods  Drink fat-free (skim) milk or 1% milk  Eat fat-free yogurt and low-fat cottage cheese  Try low-fat cheeses such as mozzarella and other reduced-fat cheeses  · Choose meat and other protein foods that are low in fat  Choose beans or other legumes such as split peas or lentils  Choose fish, skinless poultry (chicken or turkey), or lean cuts of red meat (beef or pork)  Before you cook meat or poultry, cut off any visible fat  · Use less fat and oil  Try baking foods instead of frying them  Add less fat, such as margarine, sour cream, regular salad dressing and mayonnaise to foods  Eat fewer high-fat foods   Some examples of high-fat foods include french fries, doughnuts, ice cream, and cakes   · Eat fewer sweets  Limit foods and drinks that are high in sugar  This includes candy, cookies, regular soda, and sweetened drinks  Exercise:  Exercise at least 30 minutes per day on most days of the week  Some examples of exercise include walking, biking, dancing, and swimming  You can also fit in more physical activity by taking the stairs instead of the elevator or parking farther away from stores  Ask your healthcare provider about the best exercise plan for you  © Copyright [employer] 2018 Information is for End User's use only and may not be sold, redistributed or otherwise used for commercial purposes   All illustrations and images included in CareNotes® are the copyrighted property of [employer]  or [address] [place] [address]

## 2022-12-14 ENCOUNTER — APPOINTMENT (OUTPATIENT)
Dept: RADIOLOGY | Age: 72
End: 2022-12-14

## 2022-12-14 ENCOUNTER — HOSPITAL ENCOUNTER (OUTPATIENT)
Dept: RADIOLOGY | Facility: HOSPITAL | Age: 72
Discharge: HOME | End: 2022-12-14

## 2022-12-14 DIAGNOSIS — M54.50 LUMBAR PAIN: ICD-10-CM

## 2023-01-20 LAB — COLOGUARD RESULT REPORTABLE: NEGATIVE

## 2023-02-08 ENCOUNTER — TELEPHONE (OUTPATIENT)
Dept: INTERNAL MEDICINE CLINIC | Age: 73
End: 2023-02-08

## 2023-02-08 DIAGNOSIS — E11.65 TYPE 2 DIABETES MELLITUS WITH HYPERGLYCEMIA, WITHOUT LONG-TERM CURRENT USE OF INSULIN (HCC): ICD-10-CM

## 2023-02-08 RX ORDER — PEN NEEDLE, DIABETIC 32 GX 1/6"
NEEDLE, DISPOSABLE MISCELLANEOUS
Qty: 100 EACH | Refills: 1 | Status: SHIPPED | OUTPATIENT
Start: 2023-02-08

## 2023-02-08 NOTE — TELEPHONE ENCOUNTER
Patient is asking to see if we can send over the Victoza Lynda fine Plus Pen Needles use daily  He stated that he called his Endocrinologist office for the refills but didn't specify what needles he needed  I stated that he should contact the pharmacy to see if they sent them over and also follow up with the Endocrinologist office with the refill    We don't follow with these medication and should be done with whom is prescribing them    Patient will call us back tomorrow to let us know what is going on and if he would need assistance from us on this matter

## 2023-04-06 DIAGNOSIS — E11.65 TYPE 2 DIABETES MELLITUS WITH HYPERGLYCEMIA, WITHOUT LONG-TERM CURRENT USE OF INSULIN (HCC): ICD-10-CM

## 2023-04-06 RX ORDER — PEN NEEDLE, DIABETIC 32GX 5/32"
NEEDLE, DISPOSABLE MISCELLANEOUS
Qty: 100 EACH | Refills: 1 | Status: SHIPPED | OUTPATIENT
Start: 2023-04-06

## 2023-05-02 ENCOUNTER — CATARACT CONSULTATION (OUTPATIENT)
Dept: URBAN - METROPOLITAN AREA CLINIC 6 | Facility: CLINIC | Age: 73
End: 2023-05-02

## 2023-05-02 DIAGNOSIS — H40.013: ICD-10-CM

## 2023-05-02 DIAGNOSIS — H25.813: ICD-10-CM

## 2023-05-02 DIAGNOSIS — E11.3291: ICD-10-CM

## 2023-05-02 PROCEDURE — 99204 OFFICE O/P NEW MOD 45 MIN: CPT

## 2023-05-02 ASSESSMENT — VISUAL ACUITY
OD_GLARE: 20/70
OD_PH: 20/40+1
OD_CC: 20/50+1
OS_CC: CF 4FT

## 2023-05-02 ASSESSMENT — KERATOMETRY
OS_K1POWER_DIOPTERS: 41.25
OD_K2POWER_DIOPTERS: 41.25
OS_AXISANGLE_DEGREES: 176
OD_AXISANGLE_DEGREES: 180
OS_AXISANGLE2_DEGREES: 86
OD_AXISANGLE2_DEGREES: 90
OD_K1POWER_DIOPTERS: 41.25
OS_K2POWER_DIOPTERS: 41.75

## 2023-05-02 ASSESSMENT — TONOMETRY
OS_IOP_MMHG: 16
OD_IOP_MMHG: 19

## 2023-05-05 ENCOUNTER — TELEPHONE (OUTPATIENT)
Dept: ENDOCRINOLOGY | Facility: CLINIC | Age: 73
End: 2023-05-05

## 2023-05-07 DIAGNOSIS — E78.2 MIXED HYPERLIPIDEMIA: ICD-10-CM

## 2023-05-07 DIAGNOSIS — E11.65 TYPE 2 DIABETES MELLITUS WITH HYPERGLYCEMIA, WITHOUT LONG-TERM CURRENT USE OF INSULIN (HCC): ICD-10-CM

## 2023-05-07 DIAGNOSIS — E11.8 TYPE 2 DIABETES MELLITUS WITH COMPLICATION (HCC): ICD-10-CM

## 2023-05-08 ENCOUNTER — OFFICE VISIT (OUTPATIENT)
Dept: ENDOCRINOLOGY | Facility: CLINIC | Age: 73
End: 2023-05-08

## 2023-05-08 VITALS
BODY MASS INDEX: 32.96 KG/M2 | DIASTOLIC BLOOD PRESSURE: 70 MMHG | WEIGHT: 256.8 LBS | SYSTOLIC BLOOD PRESSURE: 124 MMHG | HEIGHT: 74 IN

## 2023-05-08 DIAGNOSIS — E11.65 TYPE 2 DIABETES MELLITUS WITH HYPERGLYCEMIA, WITHOUT LONG-TERM CURRENT USE OF INSULIN (HCC): Primary | ICD-10-CM

## 2023-05-08 DIAGNOSIS — I10 BENIGN ESSENTIAL HYPERTENSION: ICD-10-CM

## 2023-05-08 DIAGNOSIS — E78.2 MIXED HYPERLIPIDEMIA: ICD-10-CM

## 2023-05-08 RX ORDER — LIRAGLUTIDE 6 MG/ML
INJECTION SUBCUTANEOUS
Qty: 27 ML | Refills: 1 | Status: SHIPPED | OUTPATIENT
Start: 2023-05-08

## 2023-05-08 RX ORDER — GLIMEPIRIDE 2 MG/1
TABLET ORAL
Qty: 270 TABLET | Refills: 1 | Status: SHIPPED | OUTPATIENT
Start: 2023-05-08

## 2023-05-08 RX ORDER — EMPAGLIFLOZIN 10 MG/1
TABLET, FILM COATED ORAL
Qty: 90 TABLET | Refills: 1 | Status: SHIPPED | OUTPATIENT
Start: 2023-05-08

## 2023-05-08 RX ORDER — PEN NEEDLE, DIABETIC 32GX 5/32"
NEEDLE, DISPOSABLE MISCELLANEOUS
Qty: 100 EACH | Refills: 1 | Status: SHIPPED | OUTPATIENT
Start: 2023-05-08

## 2023-05-08 RX ORDER — ATORVASTATIN CALCIUM 40 MG/1
TABLET, FILM COATED ORAL
Qty: 90 TABLET | Refills: 1 | Status: SHIPPED | OUTPATIENT
Start: 2023-05-08

## 2023-05-08 NOTE — PROGRESS NOTES
Patient Progress Note      CC: DM   Transferring back from [facility]      Referring Provider  No referring provider defined for this encounter  History of Present Illness:   César Moreno is a 71 y o  male with a history of type 2 diabetes without long term use of insulin  Diabetes course has been stable  Complications of DM: CAD, neuropathy  Denies recent illness or hospitalizations  Denies recent severe hypoglycemic or severe hyperglycemic episodes  Denies any issues with his current regimen  Home glucose monitoring: are performed sporadically, 2 times a week     Home blood glucose readings:   mg/dl     Current regimen: metformin 1000 mg BID, glimepiride  2 mg with breakfast and 4 mg with dinner, Victoza 1 8 mg daily, Jardiance 10 mg daily  compliant most of the time, denies any side effects from medications  Injects in: abdomen  Rotates sites: Yes  Hypoglycemic episodes: No, rare  H/o of hypoglycemia causing hospitalization or Intervention such as glucagon injection  or ambulance call :  No  Hypoglycemia symptoms: jitteriness  Treatment of hypoglycemia: candy      Medic alert tag: recommended: Yes     Diet: 3 meals per day, 2 snacks per day  Timing of meals is predictable  Diabetic diet compliance:  noncompliant some of the time  Activity: Daily activity is predictable: Yes  No routine exercise but during the summer will be doing more stuff around the house / yard  Ophthamology: Sept 2022  Podiatry: June 2022     Has hypertension: on ACE inhibitor/ARB, compliant most of the time  Has hyperlipidemia: on statin - tolerating well, no myalgias  compliant most of the time, denies any side effects from medications    Thyroid disorders: No  History of pancreatitis: No    Patient Active Problem List   Diagnosis   • Allergic rhinitis   • Anxiety   • Basal cell carcinoma of face   • Benign essential hypertension   • Coronary artery disease with history of myocardial infarction without history of CABG   • Mixed hyperlipidemia   • Type 2 diabetes mellitus with hyperglycemia, without long-term current use of insulin (HCC)   • Welcome to Medicare preventive visit   • Elevated bilirubin   • BMI 34 0-34 9,adult   • Skin lesion   • Atypical nevi   • Lipoma of left upper extremity- large      Past Medical History:   Diagnosis Date   • Cellulitis     last assessed 7/3/13   • Diabetes ([address] )     last assessed 4/10/13   • Gastroenteritis     last assessed 3/30/17   • High cholesterol    • Hypertension    • Rhus dermatitis     last assessed 8/26/15   • Skin lesion of face     last assessed 3/30/17      Past Surgical History:   Procedure Laterality Date   • ROTATOR CUFF REPAIR        Family History   Problem Relation Age of Onset   • No Known Problems Family    • Diabetes unspecified Mother    • No Known Problems Father      Social History     Tobacco Use   • Smoking status: Former     Years: 3      Types: Cigarettes, Cigars     Start date: 37     Quit date: 1989     Years since quittin 4   • Smokeless tobacco: Never   Substance Use Topics   • Alcohol use: Yes     Comment: rare- 1 if out to eat- rarely      Allergies   Allergen Reactions   • Januvia [Sitagliptin] Other (See Comments)     Unknown reaction, patient can't recall         Current Outpatient Medications:   •  amLODIPine-benazepril (LOTREL) 10-20 MG per capsule, Take 1 capsule by mouth daily, Disp: 90 capsule, Rfl: 1  •  atorvastatin (LIPITOR) 40 mg tablet, TAKE 1 TABLET BY MOUTH EVERY DAY, Disp: 90 tablet, Rfl: 1  •  BD Pen Needle Manju 2nd Gen 32G X 4 MM MISC, Use daily, Disp: 100 each, Rfl: 1  •  glimepiride (AMARYL) 2 mg tablet, TAKE 1 TABLET WITH BREAKFAST AND 2 TABLETS WITH DINNER, Disp: 270 tablet, Rfl: 1  •  Jardiance 10 MG TABS tablet, TAKE 1 TABLET BY MOUTH EVERY DAY, Disp: 90 tablet, Rfl: 1  •  Lancets (OneTouch Delica Plus [id number]) MISC, Use 2 times a day, Disp: 200 each, Rfl: 1  •  liraglutide (Victoza) injection, Inject 1 8 mg "daily, Disp: 27 mL, Rfl: 1  •  metFORMIN (GLUCOPHAGE) 1000 MG tablet, TAKE 1 TABLET BY MOUTH TWICE A DAY, Disp: 180 tablet, Rfl: 1  •  Naproxen Sodium (ALEVE PO), Take by mouth as needed, Disp: , Rfl:   •  OneTouch Verio test strip, Use 1 each 2 (two) times a day Use as instructed, Disp: 200 each, Rfl: 1  •  gabapentin (Neurontin) 300 mg capsule, Take 1 capsule (300 mg total) by mouth daily at bedtime (Patient not taking: Reported on 5/8/2023), Disp: 90 capsule, Rfl: 1  Review of Systems   Constitutional: Negative for activity change, appetite change, fatigue and unexpected weight change  HENT: Negative for trouble swallowing  Eyes: Positive for visual disturbance (cataracts)  Respiratory: Negative for shortness of breath  Cardiovascular: Negative for chest pain and palpitations  Gastrointestinal: Negative for constipation and diarrhea  Endocrine: Negative for polydipsia and polyuria  Musculoskeletal: Positive for back pain  Skin: Negative for wound  Neurological: Positive for numbness  Psychiatric/Behavioral: Negative  Physical Exam:  Body mass index is 33 42 kg/m²  /70 (BP Location: Left arm, Patient Position: Sitting, Cuff Size: Adult)   Ht 6' 1 5\" (1 867 m)   Wt 116 kg (256 lb 12 8 oz)   BMI 33 42 kg/m²    Wt Readings from Last 3 Encounters:   05/08/23 116 kg (256 lb 12 8 oz)   12/13/22 120 kg (265 lb)   06/07/22 118 kg (259 lb 8 oz)       Physical Exam  Vitals and nursing note reviewed  Constitutional:       Appearance: He is well-developed  HENT:      Head: Normocephalic  Eyes:      General: No scleral icterus  Pupils: Pupils are equal, round, and reactive to light  Neck:      Thyroid: No thyromegaly  Cardiovascular:      Rate and Rhythm: Normal rate and regular rhythm  Pulses:           Radial pulses are 2+ on the right side and 2+ on the left side  Heart sounds: No murmur heard    Pulmonary:      Effort: Pulmonary effort is normal  No respiratory " distress  Breath sounds: Normal breath sounds  No wheezing  Musculoskeletal:      Cervical back: Neck supple  Skin:     General: Skin is warm and dry  Neurological:      Mental Status: He is alert  Patient's shoes and socks were not removed  Labs:   Component      Latest Ref Rng & Units 5/2/2022 11/7/2022   Glucose, Random      65 - 99 mg/dL 119 (H) 136 (H)   BUN      7 - 25 mg/dL 18 13   Creatinine      0 70 - 1 28 mg/dL 0 76 0 76   eGFR Non       > OR = 60 mL/min/1 73m2 92    eGFR       > OR = 60 mL/min/1 73m2 107    SL AMB BUN/CREATININE RATIO      6 - 22 (calc) NOT APPLICABLE NOT APPLICABLE   Sodium      135 - 146 mmol/L 140 138   Potassium      3 5 - 5 3 mmol/L 3 8 3 9   Chloride      98 - 110 mmol/L 103 103   CO2      20 - 32 mmol/L 28 26   Calcium      8 6 - 10 3 mg/dL 9 2 9 4   eGFR      > OR = 60 mL/min/1 73m2  96   Hemoglobin A1C      <5 7 % of total Hgb 6 8 (H) 7 4 (H)       Plan:    Ozzie Joshi was seen today for diabetes mellitus and diabetes type 2  Diagnoses and all orders for this visit:    Type 2 diabetes mellitus with hyperglycemia, without long-term current use of insulin (HCC)  HGA1C 7 0%  Improved  Treatment regimen: continue current treatment   Episodes of hypoglycemia can lead to permanent disability and death  Discussed risks/complications associated with uncontrolled diabetes  Advised to adhere to diabetic diet, and recommended staying active/exercising routinely as tolerated  Keep carbohydrates consistent to limit blood glucose fluctuations  Advised to call if blood sugars less than 70 mg/dl or over 300 mg/dl  Check blood glucose 2 times a day  Discussed symptoms and treatment of hypoglycemia  Recommended routine follow-up with podiatry and ophthalmology  Ordered blood work to complete prior to next visit  -     Hemoglobin A1C; Future  -     Basic metabolic panel;  Future  -     Microalbumin / creatinine urine ratio; Future  -     liraglutide (Victoza) injection; Inject 1 8 mg daily  -     BD Pen Needle Manju 2nd Gen 32G X 4 MM MISC; Use daily    Benign essential hypertension  Blood pressure adequately controlled, continue current treatment  -     Basic metabolic panel; Future    Mixed hyperlipidemia  LDL 43  Continue statin therapy        Discussed with the patient diagnosis and treatment and all questions fully answered  He will call me if any problems arise  Counseled patient on diagnostic results, prognosis, risk and benefit of treatment options, instruction for management, importance of treatment compliance, risk factor reduction and impressions        Jackie Flores PA-C

## 2023-05-08 NOTE — PATIENT INSTRUCTIONS
Hypoglycemia instructions   Mariann Peck  5/8/2023  [id number]    Low Blood Sugar    Steps to treat low blood sugar  1  Test blood sugar if you have symptoms of low blood sugar:   Low Blood Sugar Symptoms:  o Sweaty  o Dizzy  o Rapid heartbeat  o Shaky  o Bad mood  o Hungry      2  Treat blood sugar less than 70 with 15 grams of fast-acting carbohydrate:   Examples of 15 grams Fast-Acting Carbohydrate:  o 4 oz juice  o 4 oz regular soda  o 3-4 glucose tablets (chew)  o 3-4 hard candies (chew)          3  Wait 15 minutes and test your blood sugar again     4   If blood sugar is less than 100, repeat steps 2-3     5  When your blood sugar is 100 or more, eat a snack if it will be longer than one hour until your next meal  The snack should be 15 grams of carbohydrate and a protein:   Examples of snacks:  o ½ sandwich  o 6 crackers with cheese  o Piece of fruit with cheese or peanut butter  o 6 crackers with peanut butter

## 2023-05-10 DIAGNOSIS — I10 ESSENTIAL HYPERTENSION: ICD-10-CM

## 2023-05-10 RX ORDER — AMLODIPINE BESYLATE AND BENAZEPRIL HYDROCHLORIDE 10; 20 MG/1; MG/1
1 CAPSULE ORAL DAILY
Qty: 90 CAPSULE | Refills: 1 | Status: SHIPPED | OUTPATIENT
Start: 2023-05-10

## 2023-05-15 DIAGNOSIS — E11.8 TYPE 2 DIABETES MELLITUS WITH COMPLICATION (HCC): ICD-10-CM

## 2023-05-15 DIAGNOSIS — Z79.4 TYPE 2 DIABETES MELLITUS WITH COMPLICATION, WITH LONG-TERM CURRENT USE OF INSULIN (HCC): ICD-10-CM

## 2023-05-15 DIAGNOSIS — E11.8 TYPE 2 DIABETES MELLITUS WITH COMPLICATION, WITH LONG-TERM CURRENT USE OF INSULIN (HCC): ICD-10-CM

## 2023-05-15 RX ORDER — BLOOD SUGAR DIAGNOSTIC
STRIP MISCELLANEOUS
Qty: 200 EACH | Refills: 3 | Status: SHIPPED | OUTPATIENT
Start: 2023-05-15

## 2023-05-15 RX ORDER — LANCETS 30 GAUGE
EACH MISCELLANEOUS
Qty: 200 EACH | Refills: 3 | Status: SHIPPED | OUTPATIENT
Start: 2023-05-15

## 2023-06-07 DIAGNOSIS — G62.9 NEUROPATHY: ICD-10-CM

## 2023-06-07 DIAGNOSIS — E11.65 TYPE 2 DIABETES MELLITUS WITH HYPERGLYCEMIA, WITHOUT LONG-TERM CURRENT USE OF INSULIN (HCC): ICD-10-CM

## 2023-06-07 RX ORDER — GABAPENTIN 300 MG/1
300 CAPSULE ORAL
Qty: 90 CAPSULE | Refills: 1 | Status: SHIPPED | OUTPATIENT
Start: 2023-06-07

## 2023-06-09 ENCOUNTER — PRE-OP CATARACT MEASUREMENTS (OUTPATIENT)
Dept: URBAN - METROPOLITAN AREA CLINIC 6 | Facility: CLINIC | Age: 73
End: 2023-06-09

## 2023-06-09 DIAGNOSIS — H25.813: ICD-10-CM

## 2023-06-09 PROCEDURE — 92012 INTRM OPH EXAM EST PATIENT: CPT

## 2023-06-09 PROCEDURE — 92136 OPHTHALMIC BIOMETRY: CPT

## 2023-06-09 ASSESSMENT — KERATOMETRY
OD_AXISANGLE_DEGREES: 180
OS_AXISANGLE_DEGREES: 176
OS_K2POWER_DIOPTERS: 41.75
OD_K1POWER_DIOPTERS: 41.25
OS_K1POWER_DIOPTERS: 41.25
OD_K2POWER_DIOPTERS: 41.25
OS_AXISANGLE2_DEGREES: 86
OD_AXISANGLE2_DEGREES: 90

## 2023-06-09 ASSESSMENT — VISUAL ACUITY
OD_SC: 20/50+1
OD_PH: 20/40
OD_GLARE: 20/70
OS_SC: 20/300-2

## 2023-06-09 ASSESSMENT — TONOMETRY
OS_IOP_MMHG: 18
OD_IOP_MMHG: 24

## 2023-06-12 ENCOUNTER — RA CDI HCC (OUTPATIENT)
Dept: OTHER | Facility: HOSPITAL | Age: 73
End: 2023-06-12

## 2023-06-12 NOTE — PROGRESS NOTES
[facility]  coding opportunities     E11 40, E11 69     Chart Reviewed number of suggestions sent to Provider: 2   GR    Patients Insurance     Medicare Insurance: [address] [place] [address]

## 2023-06-13 ENCOUNTER — CONSULT (OUTPATIENT)
Dept: INTERNAL MEDICINE CLINIC | Age: 73
End: 2023-06-13
Payer: COMMERCIAL

## 2023-06-13 VITALS
OXYGEN SATURATION: 96 % | BODY MASS INDEX: 32.73 KG/M2 | HEIGHT: 74 IN | TEMPERATURE: 98.3 F | WEIGHT: 255 LBS | DIASTOLIC BLOOD PRESSURE: 70 MMHG | HEART RATE: 70 BPM | SYSTOLIC BLOOD PRESSURE: 128 MMHG

## 2023-06-13 DIAGNOSIS — E11.65 TYPE 2 DIABETES MELLITUS WITH HYPERGLYCEMIA, WITHOUT LONG-TERM CURRENT USE OF INSULIN (HCC): ICD-10-CM
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
## 2023-06-13 DIAGNOSIS — Z01.818 PRE-OP EXAM: Primary | ICD-10-CM

## 2023-06-13 DIAGNOSIS — E78.2 MIXED HYPERLIPIDEMIA: ICD-10-CM

## 2023-06-13 DIAGNOSIS — H25.9 AGE-RELATED CATARACT OF BOTH EYES, UNSPECIFIED AGE-RELATED CATARACT TYPE: ICD-10-CM

## 2023-06-13 PROCEDURE — 99214 OFFICE O/P EST MOD 30 MIN: CPT | Performed by: PHYSICIAN ASSISTANT

## 2023-06-13 NOTE — PROGRESS NOTES
Assessment/Plan:         Diagnoses and all orders for this visit:    Pre-op exam  Comments:  pt at medical baseline and is optimized for cataract surgery      Age-related cataract of both eyes, unspecified age-related cataract type    Mixed hyperlipidemia  Comments:  continue atorvastatin  low chol diet     Type 2 diabetes mellitus with hyperglycemia, without long-term current use of insulin (HCC)  Comments:  follows with endocrine  monitors FBS at home  low carb diet     Tick bite, unspecified site, initial encounter  -     Lyme Total Antibody Profile with reflex to WB; Future        BMI Counseling: Body mass index is 33 18 kg/m²  The BMI is above normal  Nutrition recommendations include encouraging healthy choices of fruits and vegetables and moderation in carbohydrate intake  Exercise recommendations include exercising 3-5 times per week  Rationale for BMI follow-up plan is due to patient being overweight or obese  Depression Screening and Follow-up Plan: Patient was screened for depression during today's encounter  They screened negative with a PHQ-2 score of 0  Subjective:      Patient ID: Vinay Workman is a 71 y o  male  Presurgical Evaluation    Subjective:     Patient ID: Vinay Workman is a 71 y o  male      Patient presents with:  Pre-op Exam: Preop  left eye 6/27/23, right [facility]10[facility]         The following portions of the patient's history were reviewed and updated as appropriate: allergies, current medications, past family history, past medical history, past social history, past surgical history and problem list     Procedure date: 6/27/23, 7/10/23    Surgeon:  Dr Esther Trinidad procedure: cataract   Diagnosis for procedure:  B/l cataract    Prior anesthesia: Yes   MAC; Complications:  None / Tolerated well    CAD History: CAD    Pulmonary History: None    Renal history: None    Diabetes History:  Type 2  Controlled     Neurological History: None     On Immunosuppressant meds/biologics: No    Preop labs/testing available and reviewed: no        EKG no      Functional capacity: Shovel snow                          6 Mets   Pick the highest level patient can comfortably perform   4 mets or greater for surgery    RCRI  High Risk surgery? 1 Point  CAD History:         1 Point   MI; Positive Stress Test; CP due to Mi;  Nitrate Usage to control Angina; Pathologic Q wave on EKG  CHF Active:         1 Point   Pulm Edema; Paroxysmal Nocturnal Dyspnea;  Bibasilar Rales (crackles);S3; CHF on CXR  Cerebrovascular Disease (TIA or CVA):     1 Point  DM on Insulin:        1 Point  Serum Creat >2 0 mg/dl:       1 Point          Total Points: 0     Scorin: Class I, Very Low Risk (0 4%)     1: Class II, Low risk (0 9%)     2: Class III Moderate (6 6%)     3: Class IV High (>11%)      THANG Risk:  GFR:        For PCP:  If GFR>60, Hold ACE/ARB/Diuretic on the day of surgery, and NSAIDS 10 days before  If GFR<45, Consider PRE and POST op Nephrology Consult  If 46 <GFR> 59 : Has Patient had THANG in last 6 Months? no   If YES: Preop Nephrology consult   If No:  [address] 26 Nephrology consult  The following portions of the patient's history were reviewed and updated as appropriate: allergies, current medications, past family history, past medical history, past social history, past surgical history and problem list     Review of Systems   Constitutional: Negative for activity change, appetite change, chills, diaphoresis, fatigue and fever  HENT: Negative for congestion, ear pain, sinus pressure, sinus pain and sore throat  Eyes: Positive for visual disturbance  Negative for pain and redness  Respiratory: Negative for cough, shortness of breath and wheezing  Cardiovascular: Negative for chest pain, palpitations and leg swelling  Gastrointestinal: Negative for abdominal pain, constipation, diarrhea and nausea     Genitourinary: Negative for dysuria and frequency  Musculoskeletal: Negative for arthralgias and back pain  Skin: Negative for rash  Allergic/Immunologic: Negative for environmental allergies  Neurological: Negative for dizziness, light-headedness and headaches  Psychiatric/Behavioral: Negative for sleep disturbance  The patient is not nervous/anxious            Past Medical History:   Diagnosis Date   • Cellulitis     last assessed 7/3/13   • Diabetes ([address] )     last assessed 4/10/13   • Gastroenteritis     last assessed 3/30/17   • High cholesterol    • Hypertension    • Rhus dermatitis     last assessed 8/26/15   • Skin lesion of face     last assessed 3/30/17         Current Outpatient Medications:   •  amLODIPine-benazepril (LOTREL) 10-20 MG per capsule, Take 1 capsule by mouth daily, Disp: 90 capsule, Rfl: 1  •  atorvastatin (LIPITOR) 40 mg tablet, TAKE 1 TABLET BY MOUTH EVERY DAY, Disp: 90 tablet, Rfl: 1  •  BD Pen Needle Manju 2nd Gen 32G X 4 MM MISC, Use daily, Disp: 100 each, Rfl: 1  •  gabapentin (Neurontin) 300 mg capsule, Take 1 capsule (300 mg total) by mouth daily at bedtime, Disp: 90 capsule, Rfl: 1  •  glimepiride (AMARYL) 2 mg tablet, TAKE 1 TABLET BY MOUTH EVERY DAY WITH BREAKFAST AND TAKE 2 TABLETS WITH DINNER, Disp: 270 tablet, Rfl: 1  •  glucose blood (OneTouch Verio) test strip, Test BG 2x daily as directed, Disp: 200 each, Rfl: 3  •  Jardiance 10 MG TABS tablet, TAKE 1 TABLET BY MOUTH EVERY DAY, Disp: 90 tablet, Rfl: 1  •  Lancets (OneTouch Delica Plus [id number]) MISC, Test BG 2x daily as directed, Disp: 200 each, Rfl: 3  •  liraglutide (Victoza) injection, Inject 1 8 mg daily, Disp: 27 mL, Rfl: 1  •  metFORMIN (GLUCOPHAGE) 1000 MG tablet, TAKE 1 TABLET BY MOUTH TWICE A DAY, Disp: 180 tablet, Rfl: 1  •  Naproxen Sodium (ALEVE PO), Take by mouth as needed, Disp: , Rfl:     Allergies   Allergen Reactions   • Januvia [Sitagliptin] Other (See Comments)     Unknown reaction, patient can't recall       Social History   Past "Surgical History:   Procedure Laterality Date   • ROTATOR CUFF REPAIR  2005     Family History   Problem Relation Age of Onset   • No Known Problems Family    • Diabetes unspecified Mother    • No Known Problems Father        Objective:  /70 (BP Location: Left arm, Patient Position: Sitting, Cuff Size: Standard)   Pulse 70   Temp 98 3 °F (36 8 °C) (Temporal)   Ht 6' 1 5\" (1 867 m)   Wt 116 kg (255 lb)   SpO2 96%   BMI 33 18 kg/m²        Physical Exam  Vitals reviewed  Constitutional:       General: He is not in acute distress  HENT:      Head: Normocephalic and atraumatic  Right Ear: Tympanic membrane, ear canal and external ear normal  There is no impacted cerumen  Left Ear: Tympanic membrane, ear canal and external ear normal  There is no impacted cerumen  Nose: Nose normal       Mouth/Throat:      Mouth: Mucous membranes are moist    Eyes:      General:         Right eye: No discharge  Left eye: No discharge  Extraocular Movements: Extraocular movements intact  Conjunctiva/sclera: Conjunctivae normal       Pupils: Pupils are equal, round, and reactive to light  Cardiovascular:      Rate and Rhythm: Normal rate and regular rhythm  Pulmonary:      Effort: Pulmonary effort is normal  No respiratory distress  Breath sounds: Normal breath sounds  No wheezing or rales  Abdominal:      General: Bowel sounds are normal  There is no distension  Musculoskeletal:         General: Normal range of motion  Cervical back: Normal range of motion  Right lower leg: No edema  Left lower leg: No edema  Lymphadenopathy:      Cervical: No cervical adenopathy  Neurological:      General: No focal deficit present  Mental Status: He is alert and oriented to person, place, and time     Psychiatric:         Mood and Affect: Mood normal          "

## 2023-06-13 NOTE — PROGRESS NOTES
Diabetic Foot Exam    Patient's shoes and socks removed  Right Foot/Ankle   Right Foot Inspection  Skin Exam: skin normal, skin intact and dry skin  No warmth, no callus, no erythema, no maceration, no abnormal color, no pre-ulcer, no ulcer and no callus  Toe Exam: ROM and strength within normal limits  No swelling, no tenderness and erythema    Sensory   Monofilament testing: intact    Vascular  The right DP pulse is 2+  The right PT pulse is 2+  Left Foot/Ankle  Left Foot Inspection  Skin Exam: skin normal, skin intact and dry skin  No warmth, no erythema, no maceration, normal color, no pre-ulcer, no ulcer and no callus  Toe Exam: ROM and strength within normal limits  No swelling, no tenderness and no erythema  Sensory   Monofilament testing: intact    Vascular  The left DP pulse is 2+  The left PT pulse is 2+       Assign Risk Category  No deformity present  No loss of protective sensation  No weak pulses  Risk: 0

## 2023-06-13 NOTE — LETTER
June 13, 2023     Kelley Lindo,   [address]  [address] [address]    Patient: Brian Garza   YOB: 1950   Date of Visit: 6/13/2023       Dear Dr Liliana Tan: Thank you for referring Juan Beck to me for evaluation  Below are my notes for this consultation  If you have questions, please do not hesitate to call me  I look forward to following your patient along with you  Sincerely,        Nolan Keene PA-C        CC: No Recipients    Nolan Keene PA-C  6/13/2023  7:29 AM  Incomplete   Assessment/Plan:        Diagnoses and all orders for this visit:    Pre-op exam  Comments:  pt at medical baseline and is optimized for cataract surgery      Age-related cataract of both eyes, unspecified age-related cataract type    Mixed hyperlipidemia  Comments:  continue atorvastatin  low chol diet     Type 2 diabetes mellitus with hyperglycemia, without long-term current use of insulin (HCC)  Comments:  follows with endocrine  monitors FBS at home  low carb diet        BMI Counseling: Body mass index is 33 18 kg/m²  The BMI is above normal  Nutrition recommendations include encouraging healthy choices of fruits and vegetables and moderation in carbohydrate intake  Exercise recommendations include exercising 3-5 times per week  Rationale for BMI follow-up plan is due to patient being overweight or obese  Depression Screening and Follow-up Plan: Patient was screened for depression during today's encounter  They screened negative with a PHQ-2 score of 0  Subjective:     Patient ID: Brian Garza is a 71 y o  male  Presurgical Evaluation    Subjective:     Patient ID: Brian Garza is a 71 y o  male      Patient presents with:  Pre-op Exam: Preop  left eye 6/27/23, right eye 7/10/23- Dr Lamar [facility]         The following portions of the patient's history were reviewed and updated as appropriate: allergies, current medications, past family history, past medical history, past social history, past surgical history and problem list     Procedure date: 23, 7/10/23    Surgeon:  Dr Suad Osorio   Planned procedure: cataract   Diagnosis for procedure:  B/l cataract    Prior anesthesia: Yes   MAC; Complications:  None / Tolerated well    CAD History: CAD    Pulmonary History: None    Renal history: None    Diabetes History:  Type 2  Controlled     Neurological History: None     On Immunosuppressant meds/biologics: No    Preop labs/testing available and reviewed: no        EKG no      Functional capacity: Shovel snow                          6 Mets   Pick the highest level patient can comfortably perform   4 mets or greater for surgery    RCRI  High Risk surgery? 1 Point  CAD History:         1 Point   MI; Positive Stress Test; CP due to Mi;  Nitrate Usage to control Angina; Pathologic Q wave on EKG  CHF Active:         1 Point   Pulm Edema; Paroxysmal Nocturnal Dyspnea;  Bibasilar Rales (crackles);S3; CHF on CXR  Cerebrovascular Disease (TIA or CVA):     1 Point  DM on Insulin:        1 Point  Serum Creat >2 0 mg/dl:       1 Point          Total Points: 0     Scorin: Class I, Very Low Risk (0 4%)     1: Class II, Low risk (0 9%)     2: Class III Moderate (6 6%)     3: Class IV High (>11%)      THANG Risk:  GFR:        For PCP:  If GFR>60, Hold ACE/ARB/Diuretic on the day of surgery, and NSAIDS 10 days before  If GFR<45, Consider PRE and POST op Nephrology Consult  If 46 <GFR> 59 : Has Patient had THANG in last 6 Months? no   If YES: Preop Nephrology consult   If No:  Sj 26 Nephrology consult                     The following portions of the patient's history were reviewed and updated as appropriate: allergies, current medications, past family history, past medical history, past social history, past surgical history and problem list     Review of Systems   Constitutional: Negative for activity change, appetite change, chills, diaphoresis, fatigue and fever    HENT: Negative for congestion, ear pain, sinus pressure, sinus pain and sore throat  Eyes: Positive for visual disturbance  Negative for pain and redness  Respiratory: Negative for cough, shortness of breath and wheezing  Cardiovascular: Negative for chest pain, palpitations and leg swelling  Gastrointestinal: Negative for abdominal pain, constipation, diarrhea and nausea  Genitourinary: Negative for dysuria and frequency  Musculoskeletal: Negative for arthralgias and back pain  Skin: Negative for rash  Allergic/Immunologic: Negative for environmental allergies  Neurological: Negative for dizziness, light-headedness and headaches  Psychiatric/Behavioral: Negative for sleep disturbance  The patient is not nervous/anxious           Past Medical History:   Diagnosis Date   • Cellulitis     last assessed 7/3/13   • Diabetes ([facility] [address] )     last assessed 4/10/13   • Gastroenteritis     last assessed 3/30/17   • High cholesterol    • Hypertension    • Rhus dermatitis     last assessed 8/26/15   • Skin lesion of face     last assessed 3/30/17         Current Outpatient Medications:   •  amLODIPine-benazepril (LOTREL) 10-20 MG per capsule, Take 1 capsule by mouth daily, Disp: 90 capsule, Rfl: 1  •  atorvastatin (LIPITOR) 40 mg tablet, TAKE 1 TABLET BY MOUTH EVERY DAY, Disp: 90 tablet, Rfl: 1  •  BD Pen Needle Manju 2nd Gen 32G X 4 MM MISC, Use daily, Disp: 100 each, Rfl: 1  •  gabapentin (Neurontin) 300 mg capsule, Take 1 capsule (300 mg total) by mouth daily at bedtime, Disp: 90 capsule, Rfl: 1  •  glimepiride (AMARYL) 2 mg tablet, TAKE 1 TABLET BY MOUTH EVERY DAY WITH BREAKFAST AND TAKE 2 TABLETS WITH DINNER, Disp: 270 tablet, Rfl: 1  •  glucose blood (OneTouch Verio) test strip, Test BG 2x daily as directed, Disp: 200 each, Rfl: 3  •  Jardiance 10 MG TABS tablet, TAKE 1 TABLET BY MOUTH EVERY DAY, Disp: 90 tablet, Rfl: 1  •  Lancets (OneTouch Delica Plus [id number]) MISC, Test BG 2x daily as "directed, Disp: 200 each, Rfl: 3  •  liraglutide (Victoza) injection, Inject 1 8 mg daily, Disp: 27 mL, Rfl: 1  •  metFORMIN (GLUCOPHAGE) 1000 MG tablet, TAKE 1 TABLET BY MOUTH TWICE A DAY, Disp: 180 tablet, Rfl: 1  •  Naproxen Sodium (ALEVE PO), Take by mouth as needed, Disp: , Rfl:     Allergies   Allergen Reactions   • Januvia [Sitagliptin] Other (See Comments)     Unknown reaction, patient can't recall       Social History   Past Surgical History:   Procedure Laterality Date   • ROTATOR CUFF REPAIR  2005     Family History   Problem Relation Age of Onset   • No Known Problems Family    • Diabetes unspecified Mother    • No Known Problems Father        Objective:  /70 (BP Location: Left arm, Patient Position: Sitting, Cuff Size: Standard)   Pulse 70   Temp 98 3 °F (36 8 °C) (Temporal)   Ht 6' 1 5\" (1 867 m)   Wt 116 kg (255 lb)   SpO2 96%   BMI 33 18 kg/m²       Physical Exam  Vitals reviewed  Constitutional:       General: He is not in acute distress  HENT:      Head: Normocephalic and atraumatic  Right Ear: Tympanic membrane, ear canal and external ear normal  There is no impacted cerumen  Left Ear: Tympanic membrane, ear canal and external ear normal  There is no impacted cerumen  Nose: Nose normal       Mouth/Throat:      Mouth: Mucous membranes are moist    Eyes:      General:         Right eye: No discharge  Left eye: No discharge  Extraocular Movements: Extraocular movements intact  Conjunctiva/sclera: Conjunctivae normal       Pupils: Pupils are equal, round, and reactive to light  Cardiovascular:      Rate and Rhythm: Normal rate and regular rhythm  Pulmonary:      Effort: Pulmonary effort is normal  No respiratory distress  Breath sounds: Normal breath sounds  No wheezing or rales  Abdominal:      General: Bowel sounds are normal  There is no distension  Musculoskeletal:         General: Normal range of motion        Cervical back: Normal range " of motion  Right lower leg: No edema  Left lower leg: No edema  Lymphadenopathy:      Cervical: No cervical adenopathy  Neurological:      General: No focal deficit present  Mental Status: He is alert and oriented to person, place, and time  Psychiatric:         Mood and Affect: Mood normal           Lb Plaza PA-C  6/13/2023  7:28 AM  Sign when Signing Visit   Assessment/Plan:        Diagnoses and all orders for this visit:    Pre-op exam  Comments:  pt at medical baseline and is optimized for cataract surgery      Age-related cataract of both eyes, unspecified age-related cataract type    Mixed hyperlipidemia  Comments:  continue atorvastatin  low chol diet     Type 2 diabetes mellitus with hyperglycemia, without long-term current use of insulin (HCC)  Comments:  follows with endocrine  monitors FBS at home  low carb diet        BMI Counseling: Body mass index is 33 18 kg/m²  The BMI is above normal  Nutrition recommendations include encouraging healthy choices of fruits and vegetables and moderation in carbohydrate intake  Exercise recommendations include exercising 3-5 times per week  Rationale for BMI follow-up plan is due to patient being overweight or obese  Depression Screening and Follow-up Plan: Patient was screened for depression during today's encounter  They screened negative with a PHQ-2 score of 0  Subjective:     Patient ID: Virginia Gardner is a 71 y o  male  Presurgical Evaluation    Subjective:     Patient ID: Virginia Gardner is a 71 y o  male      Patient presents with:  Pre-op Exam: Preop  left eye 6/27/23, right [facility]         The following portions of the patient's history were reviewed and updated as appropriate: allergies, current medications, past family history, past medical history, past social history, past surgical history and problem list     Procedure date: 6/27/23, 7/10/23    Surgeon:  Dr Alejandro Ocasio procedure: cataract   Diagnosis for procedure:  B/l cataract    Prior anesthesia: Yes   MAC; Complications:  None / Tolerated well    CAD History: CAD    Pulmonary History: None    Renal history: None    Diabetes History:  Type 2  Controlled     Neurological History: None     On Immunosuppressant meds/biologics: No    Preop labs/testing available and reviewed: no        EKG no      Functional capacity: Shovel snow                          6 Mets   Pick the highest level patient can comfortably perform   4 mets or greater for surgery    RCRI  High Risk surgery? 1 Point  CAD History:         1 Point   MI; Positive Stress Test; CP due to Mi;  Nitrate Usage to control Angina; Pathologic Q wave on EKG  CHF Active:         1 Point   Pulm Edema; Paroxysmal Nocturnal Dyspnea;  Bibasilar Rales (crackles);S3; CHF on CXR  Cerebrovascular Disease (TIA or CVA):     1 Point  DM on Insulin:        1 Point  Serum Creat >2 0 mg/dl:       1 Point          Total Points: 0     Scorin: Class I, Very Low Risk (0 4%)     1: Class II, Low risk (0 9%)     2: Class III Moderate (6 6%)     3: Class IV High (>11%)      THANG Risk:  GFR:        For PCP:  If GFR>60, Hold ACE/ARB/Diuretic on the day of surgery, and NSAIDS 10 days before  If GFR<45, Consider PRE and POST op Nephrology Consult  If 46 <GFR> 59 : Has Patient had THANG in last 6 Months? no   If YES: Preop Nephrology consult   If No:  [address] 26 Nephrology consult  The following portions of the patient's history were reviewed and updated as appropriate: allergies, current medications, past family history, past medical history, past social history, past surgical history and problem list     Review of Systems   Constitutional: Negative for activity change, appetite change, chills, diaphoresis, fatigue and fever  HENT: Negative for congestion, ear pain, sinus pressure, sinus pain and sore throat  Eyes: Positive for visual disturbance   Negative for pain and redness  Respiratory: Negative for cough, shortness of breath and wheezing  Cardiovascular: Negative for chest pain, palpitations and leg swelling  Gastrointestinal: Negative for abdominal pain, constipation, diarrhea and nausea  Genitourinary: Negative for dysuria and frequency  Musculoskeletal: Negative for arthralgias and back pain  Skin: Negative for rash  Allergic/Immunologic: Negative for environmental allergies  Neurological: Negative for dizziness, light-headedness and headaches  Psychiatric/Behavioral: Negative for sleep disturbance  The patient is not nervous/anxious           Past Medical History:   Diagnosis Date   • Cellulitis     last assessed 7/3/13   • Diabetes ([facility] [address] )     last assessed 4/10/13   • Gastroenteritis     last assessed 3/30/17   • High cholesterol    • Hypertension    • Rhus dermatitis     last assessed 8/26/15   • Skin lesion of face     last assessed 3/30/17         Current Outpatient Medications:   •  amLODIPine-benazepril (LOTREL) 10-20 MG per capsule, Take 1 capsule by mouth daily, Disp: 90 capsule, Rfl: 1  •  atorvastatin (LIPITOR) 40 mg tablet, TAKE 1 TABLET BY MOUTH EVERY DAY, Disp: 90 tablet, Rfl: 1  •  BD Pen Needle Manju 2nd Gen 32G X 4 MM MISC, Use daily, Disp: 100 each, Rfl: 1  •  gabapentin (Neurontin) 300 mg capsule, Take 1 capsule (300 mg total) by mouth daily at bedtime, Disp: 90 capsule, Rfl: 1  •  glimepiride (AMARYL) 2 mg tablet, TAKE 1 TABLET BY MOUTH EVERY DAY WITH BREAKFAST AND TAKE 2 TABLETS WITH DINNER, Disp: 270 tablet, Rfl: 1  •  glucose blood (OneTouch Verio) test strip, Test BG 2x daily as directed, Disp: 200 each, Rfl: 3  •  Jardiance 10 MG TABS tablet, TAKE 1 TABLET BY MOUTH EVERY DAY, Disp: 90 tablet, Rfl: 1  •  Lancets (OneTouch Delica Plus [id number]) MISC, Test BG 2x daily as directed, Disp: 200 each, Rfl: 3  •  liraglutide (Victoza) injection, Inject 1 8 mg daily, Disp: 27 mL, Rfl: 1  •  metFORMIN (GLUCOPHAGE) 1000 MG tablet, TAKE 1 "TABLET BY MOUTH TWICE A DAY, Disp: 180 tablet, Rfl: 1  •  Naproxen Sodium (ALEVE PO), Take by mouth as needed, Disp: , Rfl:     Allergies   Allergen Reactions   • Januvia [Sitagliptin] Other (See Comments)     Unknown reaction, patient can't recall       Social History   Past Surgical History:   Procedure Laterality Date   • ROTATOR CUFF REPAIR  2005     Family History   Problem Relation Age of Onset   • No Known Problems Family    • Diabetes unspecified Mother    • No Known Problems Father        Objective:  /70 (BP Location: Left arm, Patient Position: Sitting, Cuff Size: Standard)   Pulse 70   Temp 98 3 °F (36 8 °C) (Temporal)   Ht 6' 1 5\" (1 867 m)   Wt 116 kg (255 lb)   SpO2 96%   BMI 33 18 kg/m²       Physical Exam  Vitals reviewed  Constitutional:       General: He is not in acute distress  HENT:      Head: Normocephalic and atraumatic  Right Ear: Tympanic membrane, ear canal and external ear normal  There is no impacted cerumen  Left Ear: Tympanic membrane, ear canal and external ear normal  There is no impacted cerumen  Nose: Nose normal       Mouth/Throat:      Mouth: Mucous membranes are moist    Eyes:      General:         Right eye: No discharge  Left eye: No discharge  Extraocular Movements: Extraocular movements intact  Conjunctiva/sclera: Conjunctivae normal       Pupils: Pupils are equal, round, and reactive to light  Cardiovascular:      Rate and Rhythm: Normal rate and regular rhythm  Pulmonary:      Effort: Pulmonary effort is normal  No respiratory distress  Breath sounds: Normal breath sounds  No wheezing or rales  Abdominal:      General: Bowel sounds are normal  There is no distension  Musculoskeletal:         General: Normal range of motion  Cervical back: Normal range of motion  Right lower leg: No edema  Left lower leg: No edema  Lymphadenopathy:      Cervical: No cervical adenopathy     Neurological:      " General: No focal deficit present  Mental Status: He is alert and oriented to person, place, and time  Psychiatric:         Mood and Affect: Mood normal           Angela Anguiano PA-C  6/13/2023  7:29 AM  Sign when Signing Visit  Diabetic Foot Exam    Patient's shoes and socks removed  Right Foot/Ankle   Right Foot Inspection  Skin Exam: skin normal, skin intact and dry skin  No warmth, no callus, no erythema, no maceration, no abnormal color, no pre-ulcer, no ulcer and no callus  Toe Exam: ROM and strength within normal limits  No swelling, no tenderness and erythema    Sensory   Monofilament testing: intact    Vascular  The right DP pulse is 2+  The right PT pulse is 2+  Left Foot/Ankle  Left Foot Inspection  Skin Exam: skin normal, skin intact and dry skin  No warmth, no erythema, no maceration, normal color, no pre-ulcer, no ulcer and no callus  Toe Exam: ROM and strength within normal limits  No swelling, no tenderness and no erythema  Sensory   Monofilament testing: intact    Vascular  The left DP pulse is 2+  The left PT pulse is 2+       Assign Risk Category  No deformity present  No loss of protective sensation  No weak pulses  Risk: 0

## 2023-07-05 ENCOUNTER — SURGERY/PROCEDURE (OUTPATIENT)
Dept: URBAN - METROPOLITAN AREA SURGICAL CENTER 6 | Facility: SURGICAL CENTER | Age: 73
End: 2023-07-05

## 2023-07-05 DIAGNOSIS — H25.812: ICD-10-CM

## 2023-07-05 PROCEDURE — 66984 XCAPSL CTRC RMVL W/O ECP: CPT

## 2023-07-06 ENCOUNTER — 1 DAY POST-OP (OUTPATIENT)
Dept: URBAN - METROPOLITAN AREA CLINIC 6 | Facility: CLINIC | Age: 73
End: 2023-07-06

## 2023-07-06 DIAGNOSIS — Z96.1: ICD-10-CM

## 2023-07-06 PROCEDURE — 99024 POSTOP FOLLOW-UP VISIT: CPT

## 2023-07-06 ASSESSMENT — VISUAL ACUITY: OD_SC: 20/60

## 2023-07-06 ASSESSMENT — TONOMETRY
OD_IOP_MMHG: 24
OS_IOP_MMHG: 26
OS_IOP_MMHG: 37

## 2023-07-06 ASSESSMENT — KERATOMETRY
OS_AXISANGLE_DEGREES: 176
OS_K1POWER_DIOPTERS: 41.25
OD_AXISANGLE2_DEGREES: 90
OS_AXISANGLE2_DEGREES: 86
OD_K2POWER_DIOPTERS: 41.25
OS_K2POWER_DIOPTERS: 41.75
OD_K1POWER_DIOPTERS: 41.25
OD_AXISANGLE_DEGREES: 180

## 2023-07-07 ASSESSMENT — KERATOMETRY
OD_K1POWER_DIOPTERS: 41.25
OS_AXISANGLE2_DEGREES: 86
OD_AXISANGLE_DEGREES: 180
OS_K1POWER_DIOPTERS: 41.25
OS_K2POWER_DIOPTERS: 41.75
OD_AXISANGLE2_DEGREES: 90
OD_K2POWER_DIOPTERS: 41.25
OS_AXISANGLE_DEGREES: 176

## 2023-07-11 ENCOUNTER — 1 DAY POST-OP (OUTPATIENT)
Dept: URBAN - METROPOLITAN AREA CLINIC 6 | Facility: CLINIC | Age: 73
End: 2023-07-11

## 2023-07-11 DIAGNOSIS — Z96.1: ICD-10-CM

## 2023-07-11 DIAGNOSIS — H25.811: ICD-10-CM

## 2023-07-11 PROCEDURE — 99024 POSTOP FOLLOW-UP VISIT: CPT

## 2023-07-11 ASSESSMENT — KERATOMETRY
OD_K2POWER_DIOPTERS: 41.25
OS_AXISANGLE_DEGREES: 176
OS_K2POWER_DIOPTERS: 41.75
OS_AXISANGLE2_DEGREES: 86
OS_K1POWER_DIOPTERS: 41.25
OD_AXISANGLE2_DEGREES: 90
OD_K1POWER_DIOPTERS: 41.25
OD_AXISANGLE_DEGREES: 180

## 2023-07-11 ASSESSMENT — VISUAL ACUITY
OS_PH: 20/50-1
OS_SC: 20/70+1

## 2023-07-11 ASSESSMENT — TONOMETRY
OS_IOP_MMHG: 8
OD_IOP_MMHG: 16

## 2023-07-26 ENCOUNTER — CONSULT (OUTPATIENT)
Dept: INTERNAL MEDICINE CLINIC | Age: 73
End: 2023-07-26
Payer: COMMERCIAL

## 2023-07-26 VITALS
TEMPERATURE: 98 F | OXYGEN SATURATION: 97 % | SYSTOLIC BLOOD PRESSURE: 134 MMHG | HEIGHT: 74 IN | HEART RATE: 83 BPM | DIASTOLIC BLOOD PRESSURE: 72 MMHG | WEIGHT: 255 LBS | BODY MASS INDEX: 32.73 KG/M2

## 2023-07-26 DIAGNOSIS — Z01.818 PRE-OP EXAM: Primary | ICD-10-CM

## 2023-07-26 DIAGNOSIS — I10 BENIGN ESSENTIAL HYPERTENSION: ICD-10-CM

## 2023-07-26 DIAGNOSIS — E11.65 TYPE 2 DIABETES MELLITUS WITH HYPERGLYCEMIA, WITHOUT LONG-TERM CURRENT USE OF INSULIN (HCC): ICD-10-CM

## 2023-07-26 DIAGNOSIS — H25.9 SENILE CATARACT OF RIGHT EYE, UNSPECIFIED AGE-RELATED CATARACT TYPE: ICD-10-CM

## 2023-07-26 DIAGNOSIS — I25.10 CORONARY ARTERY DISEASE WITH HISTORY OF MYOCARDIAL INFARCTION WITHOUT HISTORY OF CABG: ICD-10-CM

## 2023-07-26 DIAGNOSIS — E78.2 MIXED HYPERLIPIDEMIA: ICD-10-CM

## 2023-07-26 DIAGNOSIS — I25.2 CORONARY ARTERY DISEASE WITH HISTORY OF MYOCARDIAL INFARCTION WITHOUT HISTORY OF CABG: ICD-10-CM

## 2023-07-26 PROCEDURE — 99214 OFFICE O/P EST MOD 30 MIN: CPT | Performed by: PHYSICIAN ASSISTANT

## 2023-07-26 NOTE — LETTER
July 26, 2023     DO Shaheen Schmid [address]  [address] [address] [facility] [address] [id number]    Patient: Vikas Webb   YOB: 1950   Date of Visit: 7/26/2023       Dear Dr. Everardo Ng: Thank you for referring John Barron to me for evaluation. Below are my notes for this consultation. If you have questions, please do not hesitate to call me. I look forward to following your patient along with you. Sincerely,        Farooq Ren PA-C        CC: No Recipients    Farooq Ren PA-C  7/26/2023 10:19 AM  Incomplete   Assessment/Plan:        Diagnoses and all orders for this visit:    Pre-op exam  Comments:  pt at medical baseline and is optimized for surgery     Type 2 diabetes mellitus with hyperglycemia, without long-term current use of insulin ([address])  Comments:  follows with endocrine     Coronary artery disease with history of myocardial infarction without history of CABG    Benign essential hypertension  Comments:  well controlled    Mixed hyperlipidemia         Subjective:     Patient ID: Vikas Webb is a 68 y.o. male. Presurgical Evaluation    Subjective:     Patient ID: Vikas Webb is a 68 y.o. male. Patient presents with:  Pre-op Exam: Preop right eye cataract surgery 7/31/23- Dr Everardo Ng- [facility] eye ass[facility].        The following portions of the patient's history were reviewed and updated as appropriate: allergies, current medications, past family history, past medical history, past social history, past surgical history and problem list.    Procedure date: 7/31/23     Surgeon:  Dr Everardo Ng  Planned procedure:  R eye cataract   Diagnosis for procedure:  Cataract     Prior anesthesia: Yes   MAC; Complications:  None / Tolerated well    CAD History: CAD    Pulmonary History: None    Renal history: None    Diabetes History:  None     Neurological History: None     On Immunosuppressant meds/biologics: No      Preop labs/testing available and reviewed: no    EKG no      Functional capacity: Shovel snow                          6 Mets   Pick the highest level patient can comfortably perform   4 mets or greater for surgery    RCRI  High Risk surgery? 1 Point  CAD History:         1 Point   MI; Positive Stress Test; CP due to Mi;  Nitrate Usage to control Angina; Pathologic Q wave on EKG  CHF Active:         1 Point   Pulm Edema; Paroxysmal Nocturnal Dyspnea;  Bibasilar Rales (crackles);S3; CHF on CXR  Cerebrovascular Disease (TIA or CVA):     1 Point  DM on Insulin:        1 Point  Serum Creat >2.0 mg/dl:       1 Point          Total Points: 0     Scorin: Class I, Very Low Risk (0.4%)     1: Class II, Low risk (0.9%)     2: Class III Moderate (6.6%)     3: Class IV High (>11%)      THANG Risk:  GFR:        For PCP:  If GFR>60, Hold ACE/ARB/Diuretic on the day of surgery, and NSAIDS 10 days before. If GFR<45, Consider PRE and POST op Nephrology Consult. If 46 <GFR> 59 : Has Patient had THANG in last 6 Months? no   If YES: Preop Nephrology consult   If No:  [id number] [address] [facility] Nephrology consult. The following portions of the patient's history were reviewed and updated as appropriate: allergies, current medications, past family history, past medical history, past social history, past surgical history and problem list.    Review of Systems   Constitutional: Negative for activity change, appetite change, chills, diaphoresis and fever. HENT: Negative for congestion, postnasal drip, sinus pressure and sore throat. Eyes: Positive for visual disturbance. Negative for pain and itching. Respiratory: Negative for cough, shortness of breath and wheezing. Cardiovascular: Negative for chest pain and leg swelling. Gastrointestinal: Negative for abdominal pain, constipation, diarrhea and nausea. Genitourinary: Negative for dysuria and frequency. Musculoskeletal: Negative for arthralgias, back pain and gait problem. Skin: Negative for rash.    Neurological: Negative for dizziness, light-headedness, numbness and headaches. Hematological: Does not bruise/bleed easily. Psychiatric/Behavioral: Negative for sleep disturbance. The patient is not nervous/anxious.          Past Medical History:   Diagnosis Date   • Cellulitis     last assessed 7/3/13   • Diabetes ([address])     last assessed 4/10/13   • Gastroenteritis     last assessed 3/30/17   • High cholesterol    • Hypertension    • Rhus dermatitis     last assessed 8/26/15   • Skin lesion of face     last assessed 3/30/17         Current Outpatient Medications:   •  amLODIPine-benazepril (LOTREL) 10-20 MG per capsule, Take 1 capsule by mouth daily, Disp: 90 capsule, Rfl: 1  •  atorvastatin (LIPITOR) 40 mg tablet, TAKE 1 TABLET BY MOUTH EVERY DAY, Disp: 90 tablet, Rfl: 1  •  BD Pen Needle Manju 2nd Gen 32G X 4 MM MISC, Use daily, Disp: 100 each, Rfl: 1  •  glimepiride (AMARYL) 2 mg tablet, TAKE 1 TABLET BY MOUTH EVERY DAY WITH BREAKFAST AND TAKE 2 TABLETS WITH DINNER, Disp: 270 tablet, Rfl: 1  •  glucose blood (OneTouch Verio) test strip, Test BG 2x daily as directed, Disp: 200 each, Rfl: 3  •  Jardiance 10 MG TABS tablet, TAKE 1 TABLET BY MOUTH EVERY DAY, Disp: 90 tablet, Rfl: 1  •  Lancets (OneTouch Delica Plus [id number]) MISC, Test BG 2x daily as directed, Disp: 200 each, Rfl: 3  •  liraglutide (Victoza) injection, Inject 1.8 mg daily, Disp: 27 mL, Rfl: 1  •  metFORMIN (GLUCOPHAGE) 1000 MG tablet, TAKE 1 TABLET BY MOUTH TWICE A DAY, Disp: 180 tablet, Rfl: 1  •  Naproxen Sodium (ALEVE PO), Take by mouth as needed, Disp: , Rfl:   •  gabapentin (Neurontin) 300 mg capsule, Take 1 capsule (300 mg total) by mouth daily at bedtime (Patient not taking: Reported on 7/26/2023), Disp: 90 capsule, Rfl: 1    Allergies   Allergen Reactions   • Januvia [Sitagliptin] Other (See Comments)     Unknown reaction, patient can't recall       Social History   Past Surgical History:   Procedure Laterality Date   • ROTATOR CUFF REPAIR  2005     Family History Problem Relation Age of Onset   • No Known Problems Family    • Diabetes unspecified Mother    • No Known Problems Father        Objective:  /72 (BP Location: Left arm, Patient Position: Sitting, Cuff Size: Standard)   Pulse 83   Temp 98 °F (36.7 °C) (Temporal)   Ht 6' 1.5" (1.867 m)   Wt 116 kg (255 lb)   SpO2 97%   BMI 33.18 kg/m²       Physical Exam  Constitutional:       General: He is not in acute distress. HENT:      Head: Normocephalic and atraumatic. Right Ear: Tympanic membrane, ear canal and external ear normal.      Left Ear: Tympanic membrane, ear canal and external ear normal.      Nose: Nose normal.   Eyes:      General:         Right eye: No discharge. Left eye: No discharge. Extraocular Movements: Extraocular movements intact. Conjunctiva/sclera: Conjunctivae normal.      Pupils: Pupils are equal, round, and reactive to light. Cardiovascular:      Rate and Rhythm: Normal rate and regular rhythm. Pulmonary:      Effort: Pulmonary effort is normal. No respiratory distress. Breath sounds: Normal breath sounds. No wheezing, rhonchi or rales. Musculoskeletal:         General: Normal range of motion. Cervical back: Normal range of motion. No rigidity. Right lower leg: No edema. Left lower leg: No edema. Lymphadenopathy:      Cervical: No cervical adenopathy. Skin:     General: Skin is warm. Findings: No erythema or rash. Neurological:      General: No focal deficit present. Mental Status: He is alert and oriented to person, place, and time.    Psychiatric:         Mood and Affect: Mood normal.         Behavior: Behavior normal.          Beverly Marin PA-C  7/26/2023  9:16 AM  Incomplete   Assessment/Plan:        Diagnoses and all orders for this visit:    Pre-op exam  Comments:  pt at medical baseline and is optimized for surgery     Type 2 diabetes mellitus with hyperglycemia, without long-term current use of insulin [facility])  Comments:  follows with endocrine     Coronary artery disease with history of myocardial infarction without history of CABG    Benign essential hypertension  Comments:  well controlled    Mixed hyperlipidemia         Subjective:     Patient ID: Grabiel Beth is a 68 y.o. male. Presurgical Evaluation    Subjective:     Patient ID: Grabiel Beth is a 68 y.o. male. Patient presents with:  Pre-op Exam: Preop right eye cataract surgery 23- Dr Queenie Lo- Located within [facility] eye assoc. The following portions of the patient's history were reviewed and updated as appropriate: allergies, current medications, past family history, past medical history, past social history, past surgical history and problem list.    Procedure date: 23     Surgeon:  Dr Queenie Lo  Planned procedure:  R eye cataract   Diagnosis for procedure:  Cataract     Prior anesthesia: Yes   MAC; Complications:  None / Tolerated well    CAD History: CAD    Pulmonary History: None    Renal history: None    Diabetes History:  None     Neurological History: None     On Immunosuppressant meds/biologics: No      Preop labs/testing available and reviewed: no    EKG no      Functional capacity: Shovel snow                          6 Mets   Pick the highest level patient can comfortably perform   4 mets or greater for surgery    RCRI  High Risk surgery? 1 Point  CAD History:         1 Point   MI; Positive Stress Test; CP due to Mi;  Nitrate Usage to control Angina;  Pathologic Q wave on EKG  CHF Active:         1 Point   Pulm Edema; Paroxysmal Nocturnal Dyspnea;  Bibasilar Rales (crackles);S3; CHF on CXR  Cerebrovascular Disease (TIA or CVA):     1 Point  DM on Insulin:        1 Point  Serum Creat >2.0 mg/dl:       1 Point          Total Points: 0     Scorin: Class I, Very Low Risk (0.4%)     1: Class II, Low risk (0.9%)     2: Class III Moderate (6.6%)     3: Class IV High (>11%)      THANG Risk:  GFR:        For PCP:  If GFR>60, Hold ACE/ARB/Diuretic on the day of surgery, and NSAIDS 10 days before. If GFR<45, Consider PRE and POST op Nephrology Consult. If 46 <GFR> 59 : Has Patient had THANG in last 6 Months? no   If YES: Preop Nephrology consult   If No:  [id number] [address] [facility] Nephrology consult. The following portions of the patient's history were reviewed and updated as appropriate: allergies, current medications, past family history, past medical history, past social history, past surgical history and problem list.    Review of Systems   Constitutional: Negative for activity change, appetite change, chills, diaphoresis and fever. HENT: Negative for congestion, postnasal drip, sinus pressure and sore throat. Eyes: Positive for visual disturbance. Negative for pain and itching. Respiratory: Negative for cough, shortness of breath and wheezing. Cardiovascular: Negative for chest pain and leg swelling. Gastrointestinal: Negative for abdominal pain, constipation, diarrhea and nausea. Genitourinary: Negative for dysuria and frequency. Musculoskeletal: Negative for arthralgias, back pain and gait problem. Skin: Negative for rash. Neurological: Negative for dizziness, light-headedness, numbness and headaches. Hematological: Does not bruise/bleed easily. Psychiatric/Behavioral: Negative for sleep disturbance. The patient is not nervous/anxious.          Past Medical History:   Diagnosis Date   • Cellulitis     last assessed 7/3/13   • Diabetes ([address])     last assessed 4/10/13   • Gastroenteritis     last assessed 3/30/17   • High cholesterol    • Hypertension    • Rhus dermatitis     last assessed 8/26/15   • Skin lesion of face     last assessed 3/30/17         Current Outpatient Medications:   •  amLODIPine-benazepril (LOTREL) 10-20 MG per capsule, Take 1 capsule by mouth daily, Disp: 90 capsule, Rfl: 1  •  atorvastatin (LIPITOR) 40 mg tablet, TAKE 1 TABLET BY MOUTH EVERY DAY, Disp: 90 tablet, Rfl: 1  •  BD Pen Needle Manju 2nd Gen 32G X 4 MM MISC, Use daily, Disp: 100 each, Rfl: 1  •  glimepiride (AMARYL) 2 mg tablet, TAKE 1 TABLET BY MOUTH EVERY DAY WITH BREAKFAST AND TAKE 2 TABLETS WITH DINNER, Disp: 270 tablet, Rfl: 1  •  glucose blood (OneTouch Verio) test strip, Test BG 2x daily as directed, Disp: 200 each, Rfl: 3  •  Jardiance 10 MG TABS tablet, TAKE 1 TABLET BY MOUTH EVERY DAY, Disp: 90 tablet, Rfl: 1  •  Lancets (OneTouch Delica Plus [id number]) MISC, Test BG 2x daily as directed, Disp: 200 each, Rfl: 3  •  liraglutide (Victoza) injection, Inject 1.8 mg daily, Disp: 27 mL, Rfl: 1  •  metFORMIN (GLUCOPHAGE) 1000 MG tablet, TAKE 1 TABLET BY MOUTH TWICE A DAY, Disp: 180 tablet, Rfl: 1  •  Naproxen Sodium (ALEVE PO), Take by mouth as needed, Disp: , Rfl:   •  gabapentin (Neurontin) 300 mg capsule, Take 1 capsule (300 mg total) by mouth daily at bedtime (Patient not taking: Reported on 7/26/2023), Disp: 90 capsule, Rfl: 1    Allergies   Allergen Reactions   • Januvia [Sitagliptin] Other (See Comments)     Unknown reaction, patient can't recall       Social History   Past Surgical History:   Procedure Laterality Date   • ROTATOR CUFF REPAIR  2005     Family History   Problem Relation Age of Onset   • No Known Problems Family    • Diabetes unspecified Mother    • No Known Problems Father        Objective:  /72 (BP Location: Left arm, Patient Position: Sitting, Cuff Size: Standard)   Pulse 83   Temp 98 °F (36.7 °C) (Temporal)   Ht 6' 1.5" (1.867 m)   Wt 116 kg (255 lb)   SpO2 97%   BMI 33.18 kg/m²       Physical Exam  Constitutional:       General: He is not in acute distress. HENT:      Head: Normocephalic and atraumatic. Right Ear: Tympanic membrane, ear canal and external ear normal.      Left Ear: Tympanic membrane, ear canal and external ear normal.      Nose: Nose normal.   Eyes:      General:         Right eye: No discharge. Left eye: No discharge.       Extraocular Movements: Extraocular movements intact. Conjunctiva/sclera: Conjunctivae normal.      Pupils: Pupils are equal, round, and reactive to light. Cardiovascular:      Rate and Rhythm: Normal rate and regular rhythm. Pulmonary:      Effort: Pulmonary effort is normal. No respiratory distress. Breath sounds: Normal breath sounds. No wheezing, rhonchi or rales. Musculoskeletal:         General: Normal range of motion. Cervical back: Normal range of motion. No rigidity. Right lower leg: No edema. Left lower leg: No edema. Lymphadenopathy:      Cervical: No cervical adenopathy. Skin:     General: Skin is warm. Findings: No erythema or rash. Neurological:      General: No focal deficit present. Mental Status: He is alert and oriented to person, place, and time.    Psychiatric:         Mood and Affect: Mood normal.         Behavior: Behavior normal.

## 2023-07-26 NOTE — PROGRESS NOTES
Assessment/Plan:         Diagnoses and all orders for this visit:    Pre-op exam  Comments:  pt at medical baseline and is optimized for surgery     Type 2 diabetes mellitus with hyperglycemia, without long-term current use of insulin ([address])  Comments:  follows with endocrine     Coronary artery disease with history of myocardial infarction without history of CABG    Benign essential hypertension  Comments:  well controlled    Mixed hyperlipidemia          Subjective:      Patient ID: Kacey Rocha is a 68 y.o. male. Presurgical Evaluation    Subjective:     Patient ID: Kacey Rocha is a 68 y.o. male. Patient presents with:  Pre-op Exam: Preop right eye cataract surgery 7/31/23- Dr Jose Luis Villalpando- [facility] eye assoc. The following portions of the patient's history were reviewed and updated as appropriate: allergies, current medications, past family history, past medical history, past social history, past surgical history and problem list.    Procedure date: 7/31/23     Surgeon:  Dr Jose Luis Villalpando  Planned procedure:  R eye cataract   Diagnosis for procedure:  Cataract     Prior anesthesia: Yes   MAC; Complications:  None / Tolerated well    CAD History: CAD    Pulmonary History: None    Renal history: None    Diabetes History:  None     Neurological History: None     On Immunosuppressant meds/biologics: No      Preop labs/testing available and reviewed: no    EKG no      Functional capacity: Shovel snow                          6 Mets   Pick the highest level patient can comfortably perform   4 mets or greater for surgery    RCRI  High Risk surgery? 1 Point  CAD History:         1 Point   MI; Positive Stress Test; CP due to Mi;  Nitrate Usage to control Angina;  Pathologic Q wave on EKG  CHF Active:         1 Point   Pulm Edema; Paroxysmal Nocturnal Dyspnea;  Bibasilar Rales (crackles);S3; CHF on CXR  Cerebrovascular Disease (TIA or CVA):     1 Point  DM on Insulin:        1 Point  Serum Creat >2.0 mg/dl:       1 Point          Total Points: 0     Scorin: Class I, Very Low Risk (0.4%)     1: Class II, Low risk (0.9%)     2: Class III Moderate (6.6%)     3: Class IV High (>11%)      THANG Risk:  GFR:        For PCP:  If GFR>60, Hold ACE/ARB/Diuretic on the day of surgery, and NSAIDS 10 days before. If GFR<45, Consider PRE and POST op Nephrology Consult. If 46 <GFR> 59 : Has Patient had THANG in last 6 Months? no   If YES: Preop Nephrology consult   If No:  [id number] [address] [facility] Nephrology consult. The following portions of the patient's history were reviewed and updated as appropriate: allergies, current medications, past family history, past medical history, past social history, past surgical history and problem list.    Review of Systems   Constitutional: Negative for activity change, appetite change, chills, diaphoresis and fever. HENT: Negative for congestion, postnasal drip, sinus pressure and sore throat. Eyes: Positive for visual disturbance. Negative for pain and itching. Respiratory: Negative for cough, shortness of breath and wheezing. Cardiovascular: Negative for chest pain and leg swelling. Gastrointestinal: Negative for abdominal pain, constipation, diarrhea and nausea. Genitourinary: Negative for dysuria and frequency. Musculoskeletal: Negative for arthralgias, back pain and gait problem. Skin: Negative for rash. Neurological: Negative for dizziness, light-headedness, numbness and headaches. Hematological: Does not bruise/bleed easily. Psychiatric/Behavioral: Negative for sleep disturbance. The patient is not nervous/anxious.           Past Medical History:   Diagnosis Date   • Cellulitis     last assessed 7/3/13   • Diabetes ([address])     last assessed 4/10/13   • Gastroenteritis     last assessed 3/30/17   • High cholesterol    • Hypertension    • Rhus dermatitis     last assessed 8/26/15   • Skin lesion of face     last assessed 3/30/17         Current Outpatient Medications:   •  amLODIPine-benazepril (LOTREL) 10-20 MG per capsule, Take 1 capsule by mouth daily, Disp: 90 capsule, Rfl: 1  •  atorvastatin (LIPITOR) 40 mg tablet, TAKE 1 TABLET BY MOUTH EVERY DAY, Disp: 90 tablet, Rfl: 1  •  BD Pen Needle Manju 2nd Gen 32G X 4 MM MISC, Use daily, Disp: 100 each, Rfl: 1  •  glimepiride (AMARYL) 2 mg tablet, TAKE 1 TABLET BY MOUTH EVERY DAY WITH BREAKFAST AND TAKE 2 TABLETS WITH DINNER, Disp: 270 tablet, Rfl: 1  •  glucose blood (OneTouch Verio) test strip, Test BG 2x daily as directed, Disp: 200 each, Rfl: 3  •  Jardiance 10 MG TABS tablet, TAKE 1 TABLET BY MOUTH EVERY DAY, Disp: 90 tablet, Rfl: 1  •  Lancets (OneTouch Delica Plus [id number]) MISC, Test BG 2x daily as directed, Disp: 200 each, Rfl: 3  •  liraglutide (Victoza) injection, Inject 1.8 mg daily, Disp: 27 mL, Rfl: 1  •  metFORMIN (GLUCOPHAGE) 1000 MG tablet, TAKE 1 TABLET BY MOUTH TWICE A DAY, Disp: 180 tablet, Rfl: 1  •  Naproxen Sodium (ALEVE PO), Take by mouth as needed, Disp: , Rfl:   •  gabapentin (Neurontin) 300 mg capsule, Take 1 capsule (300 mg total) by mouth daily at bedtime (Patient not taking: Reported on 7/26/2023), Disp: 90 capsule, Rfl: 1    Allergies   Allergen Reactions   • Januvia [Sitagliptin] Other (See Comments)     Unknown reaction, patient can't recall       Social History   Past Surgical History:   Procedure Laterality Date   • ROTATOR CUFF REPAIR  2005     Family History   Problem Relation Age of Onset   • No Known Problems Family    • Diabetes unspecified Mother    • No Known Problems Father        Objective:  /72 (BP Location: Left arm, Patient Position: Sitting, Cuff Size: Standard)   Pulse 83   Temp 98 °F (36.7 °C) (Temporal)   Ht 6' 1.5" (1.867 m)   Wt 116 kg (255 lb)   SpO2 97%   BMI 33.18 kg/m²        Physical Exam  Constitutional:       General: He is not in acute distress. HENT:      Head: Normocephalic and atraumatic.       Right Ear: Tympanic membrane, ear canal and external ear normal.      Left Ear: Tympanic membrane, ear canal and external ear normal.      Nose: Nose normal.   Eyes:      General:         Right eye: No discharge. Left eye: No discharge. Extraocular Movements: Extraocular movements intact. Conjunctiva/sclera: Conjunctivae normal.      Pupils: Pupils are equal, round, and reactive to light. Cardiovascular:      Rate and Rhythm: Normal rate and regular rhythm. Pulmonary:      Effort: Pulmonary effort is normal. No respiratory distress. Breath sounds: Normal breath sounds. No wheezing, rhonchi or rales. Musculoskeletal:         General: Normal range of motion. Cervical back: Normal range of motion. No rigidity. Right lower leg: No edema. Left lower leg: No edema. Lymphadenopathy:      Cervical: No cervical adenopathy. Skin:     General: Skin is warm. Findings: No erythema or rash. Neurological:      General: No focal deficit present. Mental Status: He is alert and oriented to person, place, and time.    Psychiatric:         Mood and Affect: Mood normal.         Behavior: Behavior normal.

## 2023-07-31 ENCOUNTER — SURGERY/PROCEDURE (OUTPATIENT)
Dept: URBAN - METROPOLITAN AREA SURGICAL CENTER 6 | Facility: SURGICAL CENTER | Age: 73
End: 2023-07-31

## 2023-07-31 DIAGNOSIS — H25.811: ICD-10-CM

## 2023-07-31 PROCEDURE — 66984 XCAPSL CTRC RMVL W/O ECP: CPT | Mod: 79,RT

## 2023-08-01 ENCOUNTER — 1 DAY POST-OP (OUTPATIENT)
Dept: URBAN - METROPOLITAN AREA CLINIC 6 | Facility: CLINIC | Age: 73
End: 2023-08-01

## 2023-08-01 DIAGNOSIS — Z96.1: ICD-10-CM

## 2023-08-01 PROCEDURE — 99024 POSTOP FOLLOW-UP VISIT: CPT

## 2023-08-01 ASSESSMENT — KERATOMETRY
OD_AXISANGLE2_DEGREES: 90
OD_K2POWER_DIOPTERS: 41.25
OD_K1POWER_DIOPTERS: 41.25
OD_AXISANGLE_DEGREES: 180
OD_AXISANGLE_DEGREES: 180
OS_K2POWER_DIOPTERS: 41.75
OD_AXISANGLE2_DEGREES: 90
OD_K2POWER_DIOPTERS: 41.25
OD_K1POWER_DIOPTERS: 41.25
OS_AXISANGLE_DEGREES: 176
OS_AXISANGLE2_DEGREES: 86
OS_K1POWER_DIOPTERS: 41.25
OS_K2POWER_DIOPTERS: 41.75
OS_AXISANGLE_DEGREES: 176
OS_AXISANGLE2_DEGREES: 86
OS_K1POWER_DIOPTERS: 41.25

## 2023-08-01 ASSESSMENT — VISUAL ACUITY
OD_SC: 20/40
OS_OTHER: 3 WEEK POST OP
OS_SC: 20/30

## 2023-08-01 ASSESSMENT — TONOMETRY
OD_IOP_MMHG: 38
OS_IOP_MMHG: 30
OD_IOP_MMHG: 45

## 2023-08-08 ENCOUNTER — 1 WEEK POST-OP (OUTPATIENT)
Dept: URBAN - METROPOLITAN AREA CLINIC 6 | Facility: CLINIC | Age: 73
End: 2023-08-08

## 2023-08-08 DIAGNOSIS — Z96.1: ICD-10-CM

## 2023-08-08 PROCEDURE — 99024 POSTOP FOLLOW-UP VISIT: CPT

## 2023-08-08 ASSESSMENT — VISUAL ACUITY
OS_PH: 20/20-2
OS_SC: 20/25
OD_SC: 20/25

## 2023-08-08 ASSESSMENT — KERATOMETRY
OD_K2POWER_DIOPTERS: 41.25
OS_K1POWER_DIOPTERS: 41.25
OS_AXISANGLE_DEGREES: 176
OD_K1POWER_DIOPTERS: 41.25
OD_AXISANGLE2_DEGREES: 90
OD_AXISANGLE_DEGREES: 180
OS_K2POWER_DIOPTERS: 41.75
OS_AXISANGLE2_DEGREES: 86

## 2023-08-08 ASSESSMENT — TONOMETRY
OS_IOP_MMHG: 21
OD_IOP_MMHG: 21

## 2023-09-11 LAB
ALBUMIN/CREAT UR: 6 MCG/MG CREAT
B BURGDOR AB SER IA-ACNC: <0.9 INDEX
BUN SERPL-MCNC: 17 MG/DL (ref 7–25)
BUN/CREAT SERPL: ABNORMAL (CALC) (ref 6–22)
CALCIUM SERPL-MCNC: 9.1 MG/DL (ref 8.6–10.3)
CHLORIDE SERPL-SCNC: 108 MMOL/L (ref 98–110)
CO2 SERPL-SCNC: 24 MMOL/L (ref 20–32)
CREAT SERPL-MCNC: 0.85 MG/DL (ref 0.7–1.28)
CREAT UR-MCNC: 77 MG/DL (ref 20–320)
GFR/BSA.PRED SERPLBLD CYS-BASED-ARV: 92 ML/MIN/1.73M2
GLUCOSE SERPL-MCNC: 105 MG/DL (ref 65–99)
HBA1C MFR BLD: 6.7 % OF TOTAL HGB
MICROALBUMIN UR-MCNC: 0.5 MG/DL
POTASSIUM SERPL-SCNC: 4 MMOL/L (ref 3.5–5.3)
SODIUM SERPL-SCNC: 142 MMOL/L (ref 135–146)

## 2023-09-18 ENCOUNTER — 1 MONTH POST-OP (OUTPATIENT)
Dept: URBAN - METROPOLITAN AREA CLINIC 6 | Facility: CLINIC | Age: 73
End: 2023-09-18

## 2023-09-18 DIAGNOSIS — E11.3291: ICD-10-CM

## 2023-09-18 DIAGNOSIS — H40.013: ICD-10-CM

## 2023-09-18 DIAGNOSIS — Z96.1: ICD-10-CM

## 2023-09-18 PROCEDURE — 99024 POSTOP FOLLOW-UP VISIT: CPT

## 2023-09-18 PROCEDURE — 92134 CPTRZ OPH DX IMG PST SGM RTA: CPT

## 2023-09-18 PROCEDURE — 92133 CPTRZD OPH DX IMG PST SGM ON: CPT

## 2023-09-18 ASSESSMENT — KERATOMETRY
OD_K1POWER_DIOPTERS: 41.25
OS_AXISANGLE_DEGREES: 176
OD_K2POWER_DIOPTERS: 41.25
OS_K1POWER_DIOPTERS: 41.25
OD_AXISANGLE2_DEGREES: 90
OS_K2POWER_DIOPTERS: 41.75
OS_AXISANGLE2_DEGREES: 86
OD_AXISANGLE_DEGREES: 180

## 2023-09-18 ASSESSMENT — TONOMETRY
OD_IOP_MMHG: 30
OS_IOP_MMHG: 27
OS_IOP_MMHG: 19
OD_IOP_MMHG: 41

## 2023-09-18 ASSESSMENT — VISUAL ACUITY
OS_SC: 20/20
OD_SC: 20/25

## 2023-09-28 ENCOUNTER — OFFICE VISIT (OUTPATIENT)
Dept: ENDOCRINOLOGY | Facility: CLINIC | Age: 73
End: 2023-09-28
Payer: COMMERCIAL

## 2023-09-28 ENCOUNTER — OFFICE VISIT (OUTPATIENT)
Dept: INTERNAL MEDICINE CLINIC | Age: 73
End: 2023-09-28
Payer: COMMERCIAL

## 2023-09-28 VITALS
WEIGHT: 258 LBS | HEIGHT: 74 IN | TEMPERATURE: 97.6 F | DIASTOLIC BLOOD PRESSURE: 60 MMHG | SYSTOLIC BLOOD PRESSURE: 128 MMHG | OXYGEN SATURATION: 96 % | HEART RATE: 80 BPM | BODY MASS INDEX: 33.11 KG/M2

## 2023-09-28 VITALS
HEIGHT: 74 IN | WEIGHT: 258 LBS | BODY MASS INDEX: 33.11 KG/M2 | DIASTOLIC BLOOD PRESSURE: 82 MMHG | OXYGEN SATURATION: 98 % | SYSTOLIC BLOOD PRESSURE: 158 MMHG | HEART RATE: 94 BPM

## 2023-09-28 DIAGNOSIS — I25.10 CORONARY ARTERY DISEASE WITH HISTORY OF MYOCARDIAL INFARCTION WITHOUT HISTORY OF CABG: ICD-10-CM

## 2023-09-28 DIAGNOSIS — I25.2 CORONARY ARTERY DISEASE WITH HISTORY OF MYOCARDIAL INFARCTION WITHOUT HISTORY OF CABG: ICD-10-CM

## 2023-09-28 DIAGNOSIS — E11.69 HYPERLIPIDEMIA ASSOCIATED WITH TYPE 2 DIABETES MELLITUS: ICD-10-CM

## 2023-09-28 DIAGNOSIS — E78.5 HYPERLIPIDEMIA ASSOCIATED WITH TYPE 2 DIABETES MELLITUS: ICD-10-CM

## 2023-09-28 DIAGNOSIS — E11.65 TYPE 2 DIABETES MELLITUS WITH HYPERGLYCEMIA, WITHOUT LONG-TERM CURRENT USE OF INSULIN (HCC): ICD-10-CM

## 2023-09-28 DIAGNOSIS — E11.65 TYPE 2 DIABETES MELLITUS WITH HYPERGLYCEMIA, WITHOUT LONG-TERM CURRENT USE OF INSULIN (HCC): Primary | ICD-10-CM

## 2023-09-28 DIAGNOSIS — E78.2 MIXED HYPERLIPIDEMIA: ICD-10-CM

## 2023-09-28 DIAGNOSIS — I10 ESSENTIAL HYPERTENSION: Primary | ICD-10-CM

## 2023-09-28 DIAGNOSIS — I10 BENIGN ESSENTIAL HYPERTENSION: ICD-10-CM

## 2023-09-28 PROBLEM — E11.40 DIABETIC NEUROPATHY ASSOCIATED WITH TYPE 2 DIABETES MELLITUS (HCC): Status: ACTIVE | Noted: 2022-12-13

## 2023-09-28 PROCEDURE — 99214 OFFICE O/P EST MOD 30 MIN: CPT | Performed by: INTERNAL MEDICINE

## 2023-09-28 PROCEDURE — 99213 OFFICE O/P EST LOW 20 MIN: CPT | Performed by: PHYSICIAN ASSISTANT

## 2023-09-28 RX ORDER — AMLODIPINE BESYLATE AND BENAZEPRIL HYDROCHLORIDE 10; 20 MG/1; MG/1
1 CAPSULE ORAL DAILY
Qty: 90 CAPSULE | Refills: 1 | Status: SHIPPED | OUTPATIENT
Start: 2023-09-28

## 2023-09-28 NOTE — PROGRESS NOTES
Soni Powell 68 y.o. male MRN: [id number]    Encounter: [id number]      Assessment/Plan     Assessment: This is a 68y.o.-year-old male with diabetes with hyperglycemia. Plan:    Diagnoses and all orders for this visit:    Type 2 diabetes mellitus with hyperglycemia, without long-term current use of insulin ([address])  Lab Results   Component Value Date    HGBA1C 6.7 (H) 09/11/2023   A1c 6.7%, at goal  Continue current medical management. Discussed to stay away from free sugars and carbohydrates  Educated about hypoglycemia symptoms and treatment. So discussed that she should not take glimepiride if not eating or having nausea or vomiting and call office in that situation. Discussed the blood sugars goal is at 80 to 180 mg per DL          -     Basic metabolic panel; Future  -     Hemoglobin A1C; Future    Benign essential hypertension  Blood pressure is elevated  Goal for blood pressure is less than 130/80 mmHg  Continue current management    Mixed hyperlipidemia  LDL is at goal, continue statins. Repeat lipid profile before next appointment    -     Lipid panel- Lab Collect; Future    Coronary artery disease with history of myocardial infarction without history of CABG  Stable. managed by cardiology      CC: Diabetes    History of Present Illness     HPI:    Soni Powell is 20-year-old gentleman with medical history of type 2 diabetes, with complications such as neuropathy and coronary artery disease is here for follow-up. He denies any recent illnesses or hospitalizations. He denies any severe hyperglycemic or hypoglycemic episodes. Current regimen includes metformin 1000 mg BID, glimepiride  2 mg with breakfast and 4 mg with dinner, Victoza 1.8 mg daily, Jardiance 10 mg daily.     Checks blood sugars twice daily, his blood sugars are usually in 100 to 140 mg per DL range   He denies hypoglycemic episodes, lowest blood sugar was 80 mg per DL   He understands the symptoms of hypoglycemia and treatment     hypertension he takes Lotrel 10/20 mg, 1 capsule daily  Hyperlipidemia, he takes Lipitor 40 mg daily    Component 9/15/22   Right Eye Diabetic Retinopathy None    Left Eye Diabetic Retinopathy None         Lab Results   Component Value Date    LDLCALC 46 12/07/2021       Component      Latest Ref Rng 9/11/2023   Glucose, Random      65 - 99 mg/dL 105 (H)    BUN      7 - 25 mg/dL 17    Creatinine      0.70 - 1.28 mg/dL 0.85    eGFR      > OR = 60 mL/min/1.73m2 92    SL AMB BUN/CREATININE RATIO      6 - 22 (calc) SEE NOTE:    Sodium      135 - 146 mmol/L 142    Potassium      3.5 - 5.3 mmol/L 4.0    Chloride      98 - 110 mmol/L 108    CO2      20 - 32 mmol/L 24    Calcium      8.6 - 10.3 mg/dL 9.1    EXT Creatinine Urine      20 - 320 mg/dL 77    MICROALBUM.,U,RANDOM      See Note: mg/dL 0.5    MICROALBUMIN/CREATININE RATIO      <30 mcg/mg creat 6    SL AMB LYME AB SCREEN      index <0.90    Hemoglobin A1C      <5.7 % of total Hgb 6.7 (H)             Lab Results   Component Value Date    HGBA1C 6.7 (H) 09/11/2023          Review of Systems   Constitutional: Negative for activity change, diaphoresis, fatigue, fever and unexpected weight change. HENT: Negative. Eyes: Negative for visual disturbance. Respiratory: Negative for cough, chest tightness and shortness of breath. Cardiovascular: Negative for chest pain, palpitations and leg swelling. Gastrointestinal: Negative for abdominal pain, blood in stool, constipation, diarrhea, nausea and vomiting. Endocrine: Negative for cold intolerance, heat intolerance, polydipsia, polyphagia and polyuria. Genitourinary: Negative for dysuria, enuresis, frequency and urgency. Musculoskeletal: Negative for arthralgias and myalgias. Skin: Negative for pallor, rash and wound. Allergic/Immunologic: Negative. Neurological: Negative for dizziness, tremors, weakness and numbness. Hematological: Negative. Psychiatric/Behavioral: Negative. Historical Information   Past Medical History:   Diagnosis Date   • Cellulitis     last assessed 7/3/13   • Diabetes ([address])     last assessed 4/10/13   • Gastroenteritis     last assessed 3/30/17   • High cholesterol    • Hypertension    • Rhus dermatitis     last assessed 8/26/15   • Skin lesion of face     last assessed 3/30/17     Past Surgical History:   Procedure Laterality Date   • ROTATOR CUFF REPAIR       Social History   Social History     Substance and Sexual Activity   Alcohol Use Yes    Comment: rare- 1 if out to eat- rarely      Social History     Substance and Sexual Activity   Drug Use No     Social History     Tobacco Use   Smoking Status Former   • Packs/day: 0.50   • Years: 9.00   • Total pack years: 4.50   • Types: Cigarettes, Cigars   • Start date: 37   • Quit date: 1989   • Years since quittin.8   Smokeless Tobacco Never     Family History:   Family History   Problem Relation Age of Onset   • No Known Problems Family    • Diabetes unspecified Mother    • No Known Problems Father        Meds/Allergies   Current Outpatient Medications   Medication Sig Dispense Refill   • amLODIPine-benazepril (LOTREL) 10-20 MG per capsule Take 1 capsule by mouth daily 90 capsule 1   • atorvastatin (LIPITOR) 40 mg tablet TAKE 1 TABLET BY MOUTH EVERY DAY 90 tablet 1   • BD Pen Needle Manju 2nd Gen 32G X 4 MM MISC Use daily 100 each 1   • glimepiride (AMARYL) 2 mg tablet TAKE 1 TABLET BY MOUTH EVERY DAY WITH BREAKFAST AND TAKE 2 TABLETS WITH DINNER 270 tablet 1   • glucose blood (OneTouch Verio) test strip Test BG 2x daily as directed 200 each 3   • Jardiance 10 MG TABS tablet TAKE 1 TABLET BY MOUTH EVERY DAY 90 tablet 1   • Lancets (OneTouch Delica Plus [id number]) MISC Test BG 2x daily as directed 200 each 3   • liraglutide (Victoza) injection Inject 1.8 mg daily 27 mL 1   • metFORMIN (GLUCOPHAGE) 1000 MG tablet TAKE 1 TABLET BY MOUTH TWICE A  tablet 1   • Naproxen Sodium (ALEVE PO) Take by mouth as needed     • gabapentin (Neurontin) 300 mg capsule Take 1 capsule (300 mg total) by mouth daily at bedtime (Patient not taking: Reported on 7/26/2023) 90 capsule 1     No current facility-administered medications for this visit. Allergies   Allergen Reactions   • Januvia [Sitagliptin] Other (See Comments)     Unknown reaction, patient can't recall       Objective   Vitals: Blood pressure 158/82, pulse 94, height 6' 1.5" (1.867 m), weight 117 kg (258 lb), SpO2 98 %. Physical Exam  Vitals reviewed. Constitutional:       General: He is not in acute distress. Appearance: He is well-developed. HENT:      Head: Normocephalic and atraumatic. Eyes:      Pupils: Pupils are equal, round, and reactive to light. Neck:      Thyroid: No thyromegaly. Cardiovascular:      Rate and Rhythm: Normal rate and regular rhythm. Heart sounds: Normal heart sounds. Pulmonary:      Effort: Pulmonary effort is normal. No respiratory distress. Breath sounds: Normal breath sounds. Musculoskeletal:         General: No deformity. Normal range of motion. Cervical back: Normal range of motion and neck supple. Skin:     General: Skin is warm and dry. Findings: No erythema. Neurological:      Mental Status: He is alert and oriented to person, place, and time. The history was obtained from the review of the chart, patient.     Lab Results:   Lab Results   Component Value Date/Time    Hemoglobin A1C 6.7 (H) 09/11/2023 06:51 AM    Hemoglobin A1C 7.4 (H) 11/07/2022 07:01 AM    White Blood Cell Count 5.3 11/07/2022 07:07 AM    Hemoglobin 17.7 (H) 11/07/2022 07:07 AM    HCT 51.6 (H) 11/07/2022 07:07 AM    MCV 92.5 11/07/2022 07:07 AM    Platelet Count 893 97/30/6711 07:07 AM    BUN 17 09/11/2023 06:51 AM    BUN 13 11/07/2022 07:01 AM    Potassium 4.0 09/11/2023 06:51 AM    Potassium 3.9 11/07/2022 07:01 AM    Chloride 108 09/11/2023 06:51 AM    Chloride 103 11/07/2022 07:01 AM    CO2 24 09/11/2023 06:51 AM    CO2 26 11/07/2022 07:01 AM    Creatinine 0.85 09/11/2023 06:51 AM    Creatinine 0.76 11/07/2022 07:01 AM           Imaging Studies: I have personally reviewed pertinent reports. Portions of the record may have been created with voice recognition software. Occasional wrong word or "sound a like" substitutions may have occurred due to the inherent limitations of voice recognition software. Read the chart carefully and recognize, using context, where substitutions have occurred.

## 2023-09-28 NOTE — PROGRESS NOTES
Assessment/Plan:         Diagnoses and all orders for this visit:    Essential hypertension  Comments:  well controlled    Orders:  -     amLODIPine-benazepril (LOTREL) 10-20 MG per capsule; Take 1 capsule by mouth daily    Hyperlipidemia associated with type 2 diabetes mellitus   Comments:  continue atorvastatin   Orders:  -     CBC and differential; Future    Type 2 diabetes mellitus with hyperglycemia, without long-term current use of insulin (HCC)  Comments:  hgba1c improved  follows with endocrine   Orders:  -     CBC and differential; Future    Coronary artery disease with history of myocardial infarction without history of CABG  -     CBC and differential; Future    Other orders  -     brimonidine (ALPHAGAN P) 0.1 %; Administer to the right eye 3 (three) times a day          Falls Plan of Care: balance, strength, and gait training instructions were provided. Subjective:      Patient ID: Gertrudis Carbajal is a 68 y.o. male. 67 y/o male with hx of dm, htn, hld presents for f/u   Pt saw endocrine this am for follow up  hgba1c improved 6.7 (was 7.4)   microalbumin negative    Reviewed labs with pt   Lyme test negative, pt was concerned due to noticing ticks lately, not embedded    Pt had recent cataract surgery which he states improved vision but now has halos around vision - discussed with eye doctor who states this will improve over time       The following portions of the patient's history were reviewed and updated as appropriate: allergies, current medications, past family history, past medical history, past social history, past surgical history and problem list.    Review of Systems   Constitutional: Negative for activity change, appetite change, chills, diaphoresis, fatigue and fever. HENT: Negative for congestion and sore throat. Eyes: Negative for pain and redness. Respiratory: Negative for cough, chest tightness, shortness of breath and wheezing.     Cardiovascular: Negative for chest pain and leg swelling. Gastrointestinal: Negative for abdominal pain, constipation, diarrhea and nausea. Genitourinary: Negative for dysuria and frequency. Musculoskeletal: Positive for arthralgias (mild joint pains). Negative for back pain and gait problem. Skin: Negative for rash. Neurological: Negative for dizziness, light-headedness and numbness. Psychiatric/Behavioral: Negative for sleep disturbance (sleeps 8-10 hrs / night ). The patient is not nervous/anxious.           Past Medical History:   Diagnosis Date   • Cellulitis     last assessed 7/3/13   • Diabetes ([address])     last assessed 4/10/13   • Gastroenteritis     last assessed 3/30/17   • High cholesterol    • Hypertension    • Rhus dermatitis     last assessed 8/26/15   • Skin lesion of face     last assessed 3/30/17         Current Outpatient Medications:   •  amLODIPine-benazepril (LOTREL) 10-20 MG per capsule, Take 1 capsule by mouth daily, Disp: 90 capsule, Rfl: 1  •  atorvastatin (LIPITOR) 40 mg tablet, TAKE 1 TABLET BY MOUTH EVERY DAY, Disp: 90 tablet, Rfl: 1  •  BD Pen Needle Manju 2nd Gen 32G X 4 MM MISC, Use daily, Disp: 100 each, Rfl: 1  •  brimonidine (ALPHAGAN P) 0.1 %, Administer to the right eye 3 (three) times a day, Disp: , Rfl:   •  glimepiride (AMARYL) 2 mg tablet, TAKE 1 TABLET BY MOUTH EVERY DAY WITH BREAKFAST AND TAKE 2 TABLETS WITH DINNER, Disp: 270 tablet, Rfl: 1  •  glucose blood (OneTouch Verio) test strip, Test BG 2x daily as directed, Disp: 200 each, Rfl: 3  •  Jardiance 10 MG TABS tablet, TAKE 1 TABLET BY MOUTH EVERY DAY, Disp: 90 tablet, Rfl: 1  •  Lancets (OneTouch Delica Plus [id number]) MISC, Test BG 2x daily as directed, Disp: 200 each, Rfl: 3  •  liraglutide (Victoza) injection, Inject 1.8 mg daily, Disp: 27 mL, Rfl: 1  •  metFORMIN (GLUCOPHAGE) 1000 MG tablet, TAKE 1 TABLET BY MOUTH TWICE A DAY, Disp: 180 tablet, Rfl: 1  •  Naproxen Sodium (ALEVE PO), Take by mouth as needed, Disp: , Rfl:     Allergies   Allergen Reactions   • Januvia [Sitagliptin] Other (See Comments)     Unknown reaction, patient can't recall       Social History   Past Surgical History:   Procedure Laterality Date   • ROTATOR CUFF REPAIR  2005     Family History   Problem Relation Age of Onset   • No Known Problems Family    • Diabetes unspecified Mother    • No Known Problems Father        Objective:  /60 (BP Location: Left arm, Patient Position: Sitting, Cuff Size: Large)   Pulse 80   Temp 97.6 °F (36.4 °C) (Temporal)   Ht 6' 1.5" (1.867 m)   Wt 117 kg (258 lb)   SpO2 96% Comment: room air  BMI 33.58 kg/m²        Physical Exam  Vitals reviewed. Constitutional:       General: He is not in acute distress. HENT:      Head: Normocephalic and atraumatic. Right Ear: Tympanic membrane, ear canal and external ear normal.      Left Ear: Tympanic membrane, ear canal and external ear normal.      Nose: Nose normal.      Mouth/Throat:      Mouth: Mucous membranes are moist.   Eyes:      General:         Right eye: No discharge. Left eye: No discharge. Extraocular Movements: Extraocular movements intact. Conjunctiva/sclera: Conjunctivae normal.      Pupils: Pupils are equal, round, and reactive to light. Cardiovascular:      Rate and Rhythm: Normal rate and regular rhythm. Pulmonary:      Effort: Pulmonary effort is normal. No respiratory distress. Breath sounds: Normal breath sounds. No wheezing, rhonchi or rales. Abdominal:      General: Bowel sounds are normal. There is no distension. Musculoskeletal:      Cervical back: Normal range of motion. No rigidity. Right lower leg: No edema. Left lower leg: No edema. Lymphadenopathy:      Cervical: No cervical adenopathy. Skin:     Findings: No erythema or rash. Neurological:      General: No focal deficit present. Mental Status: He is alert and oriented to person, place, and time. Cranial Nerves: No cranial nerve deficit.    Psychiatric: Mood and Affect: Mood normal.         Behavior: Behavior normal.

## 2023-10-12 ENCOUNTER — IOP CHECK (OUTPATIENT)
Dept: URBAN - METROPOLITAN AREA CLINIC 6 | Facility: CLINIC | Age: 73
End: 2023-10-12

## 2023-10-12 DIAGNOSIS — H40.053: ICD-10-CM

## 2023-10-12 DIAGNOSIS — Z96.1: ICD-10-CM

## 2023-10-12 DIAGNOSIS — H40.013: ICD-10-CM

## 2023-10-12 PROCEDURE — 99024 POSTOP FOLLOW-UP VISIT: CPT

## 2023-10-12 ASSESSMENT — KERATOMETRY
OD_K1POWER_DIOPTERS: 41.25
OD_AXISANGLE2_DEGREES: 90
OS_K1POWER_DIOPTERS: 41.25
OD_AXISANGLE_DEGREES: 180
OS_AXISANGLE_DEGREES: 176
OD_K2POWER_DIOPTERS: 41.25
OS_AXISANGLE2_DEGREES: 86
OS_K2POWER_DIOPTERS: 41.75

## 2023-10-12 ASSESSMENT — TONOMETRY
OD_IOP_MMHG: 34
OD_IOP_MMHG: 26
OS_IOP_MMHG: 20
OS_IOP_MMHG: 16

## 2023-10-12 ASSESSMENT — VISUAL ACUITY
OD_SC: 20/25+1
OS_SC: 20/20-1

## 2023-10-24 ENCOUNTER — TELEPHONE (OUTPATIENT)
Dept: INTERNAL MEDICINE CLINIC | Age: 73
End: 2023-10-24

## 2023-10-24 DIAGNOSIS — E11.65 TYPE 2 DIABETES MELLITUS WITH HYPERGLYCEMIA, WITHOUT LONG-TERM CURRENT USE OF INSULIN (HCC): ICD-10-CM

## 2023-10-24 RX ORDER — LIRAGLUTIDE 6 MG/ML
INJECTION SUBCUTANEOUS
Qty: 27 ML | Refills: 1 | Status: SHIPPED | OUTPATIENT
Start: 2023-10-24

## 2023-11-10 DIAGNOSIS — E78.2 MIXED HYPERLIPIDEMIA: ICD-10-CM

## 2023-11-10 DIAGNOSIS — E11.65 TYPE 2 DIABETES MELLITUS WITH HYPERGLYCEMIA, WITHOUT LONG-TERM CURRENT USE OF INSULIN (HCC): ICD-10-CM

## 2023-11-10 DIAGNOSIS — E11.8 TYPE 2 DIABETES MELLITUS WITH COMPLICATION (HCC): ICD-10-CM

## 2023-11-10 RX ORDER — ATORVASTATIN CALCIUM 40 MG/1
TABLET, FILM COATED ORAL
Qty: 90 TABLET | Refills: 1 | Status: SHIPPED | OUTPATIENT
Start: 2023-11-10

## 2023-11-10 RX ORDER — EMPAGLIFLOZIN 10 MG/1
TABLET, FILM COATED ORAL
Qty: 90 TABLET | Refills: 1 | Status: SHIPPED | OUTPATIENT
Start: 2023-11-10

## 2023-11-10 RX ORDER — GLIMEPIRIDE 2 MG/1
TABLET ORAL
Qty: 270 TABLET | Refills: 1 | Status: SHIPPED | OUTPATIENT
Start: 2023-11-10

## 2023-11-15 ENCOUNTER — IOP CHECK (OUTPATIENT)
Dept: URBAN - METROPOLITAN AREA CLINIC 6 | Facility: CLINIC | Age: 73
End: 2023-11-15

## 2023-11-15 DIAGNOSIS — H40.013: ICD-10-CM

## 2023-11-15 DIAGNOSIS — H40.053: ICD-10-CM

## 2023-11-15 DIAGNOSIS — Z96.1: ICD-10-CM

## 2023-11-15 LAB
LEFT EYE DIABETIC RETINOPATHY: NORMAL
RIGHT EYE DIABETIC RETINOPATHY: POSITIVE
SEVERITY (EYE EXAM): NORMAL

## 2023-11-15 PROCEDURE — 92014 COMPRE OPH EXAM EST PT 1/>: CPT

## 2023-11-15 ASSESSMENT — TONOMETRY
OS_IOP_MMHG: 14
OS_IOP_MMHG: 16
OD_IOP_MMHG: 30
OD_IOP_MMHG: 22

## 2023-11-15 ASSESSMENT — KERATOMETRY
OD_K2POWER_DIOPTERS: 41.25
OS_K1POWER_DIOPTERS: 41.25
OD_AXISANGLE_DEGREES: 180
OD_AXISANGLE2_DEGREES: 90
OS_K2POWER_DIOPTERS: 41.75
OS_AXISANGLE_DEGREES: 176
OS_AXISANGLE2_DEGREES: 86
OD_K1POWER_DIOPTERS: 41.25

## 2023-11-15 ASSESSMENT — VISUAL ACUITY
OD_SC: 20/25
OS_SC: 20/20-2

## 2023-12-12 ENCOUNTER — RA CDI HCC (OUTPATIENT)
Dept: OTHER | Facility: HOSPITAL | Age: 73
End: 2023-12-12

## 2023-12-12 NOTE — PROGRESS NOTES
[address] [facility] coding opportunities       Chart reviewed, no opportunity found: [id number]       Patients Insurance     Medicare Insurance: [employer]

## 2023-12-21 ENCOUNTER — OFFICE VISIT (OUTPATIENT)
Dept: INTERNAL MEDICINE CLINIC | Age: 73
End: 2023-12-21
Payer: COMMERCIAL

## 2023-12-21 ENCOUNTER — RA CDI HCC (OUTPATIENT)
Dept: OTHER | Facility: HOSPITAL | Age: 73
End: 2023-12-21

## 2023-12-21 VITALS
BODY MASS INDEX: 32.34 KG/M2 | OXYGEN SATURATION: 97 % | WEIGHT: 252 LBS | HEIGHT: 74 IN | DIASTOLIC BLOOD PRESSURE: 74 MMHG | HEART RATE: 89 BPM | TEMPERATURE: 97.1 F | SYSTOLIC BLOOD PRESSURE: 142 MMHG

## 2023-12-21 DIAGNOSIS — Z00.00 ENCOUNTER FOR ANNUAL WELLNESS VISIT (AWV) IN MEDICARE PATIENT: Primary | ICD-10-CM

## 2023-12-21 DIAGNOSIS — Z12.5 PROSTATE CANCER SCREENING: ICD-10-CM

## 2023-12-21 PROCEDURE — G0439 PPPS, SUBSEQ VISIT: HCPCS | Performed by: PHYSICIAN ASSISTANT

## 2023-12-21 NOTE — PROGRESS NOTES
HCC coding opportunities     E11.40     Chart Reviewed number of suggestions sent to Provider: 1   GR    Patients Insurance     Medicare Insurance: [employer]age

## 2023-12-21 NOTE — PATIENT INSTRUCTIONS
Medicare Preventive Visit Patient Instructions  Thank you for completing your Welcome to Medicare Visit or Medicare Annual Wellness Visit today. Your next wellness visit will be due in one year (12/21/2024).  The screening/preventive services that you may require over the next 5-10 years are detailed below. Some tests may not apply to you based off risk factors and/or age. Screening tests ordered at today's visit but not completed yet may show as past due. Also, please note that scanned in results may not display below.  Preventive Screenings:  Service Recommendations Previous Testing/Comments   Colorectal Cancer Screening  Colonoscopy    Fecal Occult Blood Test (FOBT)/Fecal Immunochemical Test (FIT)  Fecal DNA/Cologuard Test  Flexible Sigmoidoscopy Age: 45-75 years old   Colonoscopy: every 10 years (May be performed more frequently if at higher risk)  OR  FOBT/FIT: every 1 year  OR  Cologuard: every 3 years  OR  Sigmoidoscopy: every 5 years  Screening may be recommended earlier than age 45 if at higher risk for colorectal cancer. Also, an individualized decision between you and your healthcare provider will decide whether screening between the ages of 76-85 would be appropriate. Colonoscopy: 01/15/2023  FOBT/FIT: Not on file  Cologuard: 01/15/2023  Sigmoidoscopy: Not on file    Screening Current     Prostate Cancer Screening Individualized decision between patient and health care provider in men between ages of 55-69   Medicare will cover every 12 months beginning on the day after your 50th birthday PSA: 2.79 ng/mL           Hepatitis C Screening Once for adults born between 1945 and 1965  More frequently in patients at high risk for Hepatitis C Hep C Antibody: 07/29/2019    Screening Current   Diabetes Screening 1-2 times per year if you're at risk for diabetes or have pre-diabetes Fasting glucose: No results in last 5 years (No results in last 5 years)  A1C: 6.7 % of total Hgb (9/11/2023)  Screening Not  Indicated  History Diabetes   Cholesterol Screening Once every 5 years if you don't have a lipid disorder. May order more often based on risk factors. Lipid panel: 12/07/2021  Screening Not Indicated  History Lipid Disorder      Other Preventive Screenings Covered by Medicare:  Abdominal Aortic Aneurysm (AAA) Screening: covered once if your at risk. You're considered to be at risk if you have a family history of AAA or a male between the age of 65-75 who smoking at least 100 cigarettes in your lifetime.  Lung Cancer Screening: covers low dose CT scan once per year if you meet all of the following conditions: (1) Age 55-77; (2) No signs or symptoms of lung cancer; (3) Current smoker or have quit smoking within the last 15 years; (4) You have a tobacco smoking history of at least 20 pack years (packs per day x number of years you smoked); (5) You get a written order from a healthcare provider.  Glaucoma Screening: covered annually if you're considered high risk: (1) You have diabetes OR (2) Family history of glaucoma OR (3)  aged 50 and older OR (4)  American aged 65 and older  Osteoporosis Screening: covered every 2 years if you meet one of the following conditions: (1) Have a vertebral abnormality; (2) On glucocorticoid therapy for more than 3 months; (3) Have primary hyperparathyroidism; (4) On osteoporosis medications and need to assess response to drug therapy.  HIV Screening: covered annually if you're between the age of 15-65. Also covered annually if you are younger than 15 and older than 65 with risk factors for HIV infection. For pregnant patients, it is covered up to 3 times per pregnancy.    Immunizations:  Immunization Recommendations   Influenza Vaccine Annual influenza vaccination during flu season is recommended for all persons aged >= 6 months who do not have contraindications   Pneumococcal Vaccine   * Pneumococcal conjugate vaccine = PCV13 (Prevnar 13), PCV15 (Vaxneuvance),  PCV20 (Prevnar 20)  * Pneumococcal polysaccharide vaccine = PPSV23 (Pneumovax) Adults 19-63 yo with certain risk factors or if 65+ yo  If never received any pneumonia vaccine: recommend Prevnar 20 (PCV20)  Give PCV20 if previously received 1 dose of PCV13 or PPSV23   Hepatitis B Vaccine 3 dose series if at intermediate or high risk (ex: diabetes, end stage renal disease, liver disease)   Respiratory syncytial virus (RSV) Vaccine - COVERED BY MEDICARE PART D  * RSVPreF3 (Arexvy) CDC recommends that adults 60 years of age and older may receive a single dose of RSV vaccine using shared clinical decision-making (SCDM)   Tetanus (Td) Vaccine - COST NOT COVERED BY MEDICARE PART B Following completion of primary series, a booster dose should be given every 10 years to maintain immunity against tetanus. Td may also be given as tetanus wound prophylaxis.   Tdap Vaccine - COST NOT COVERED BY MEDICARE PART B Recommended at least once for all adults. For pregnant patients, recommended with each pregnancy.   Shingles Vaccine (Shingrix) - COST NOT COVERED BY MEDICARE PART B  2 shot series recommended in those 19 years and older who have or will have weakened immune systems or those 50 years and older     Health Maintenance Due:      Topic Date Due   • Colorectal Cancer Screening  01/15/2026   • Hepatitis C Screening  Completed     Immunizations Due:      Topic Date Due   • Pneumococcal Vaccine: 65+ Years (1 - PCV) Never done   • Influenza Vaccine (1) Never done   • COVID-19 Vaccine (3 - 2023-24 season) 09/01/2023     Advance Directives   What are advance directives?  Advance directives are legal documents that state your wishes and plans for medical care. These plans are made ahead of time in case you lose your ability to make decisions for yourself. Advance directives can apply to any medical decision, such as the treatments you want, and if you want to donate organs.   What are the types of advance directives?  There are many  types of advance directives, and each state has rules about how to use them. You may choose a combination of any of the following:  Living will:  This is a written record of the treatment you want. You can also choose which treatments you do not want, which to limit, and which to stop at a certain time. This includes surgery, medicine, IV fluid, and tube feedings.   Durable power of  for healthcare (DPAHC):  This is a written record that states who you want to make healthcare choices for you when you are unable to make them for yourself. This person, called a proxy, is usually a family member or a friend. You may choose more than 1 proxy.  Do not resuscitate (DNR) order:  A DNR order is used in case your heart stops beating or you stop breathing. It is a request not to have certain forms of treatment, such as CPR. A DNR order may be included in other types of advance directives.  Medical directive:  This covers the care that you want if you are in a coma, near death, or unable to make decisions for yourself. You can list the treatments you want for each condition. Treatment may include pain medicine, surgery, blood transfusions, dialysis, IV or tube feedings, and a ventilator (breathing machine).  Values history:  This document has questions about your views, beliefs, and how you feel and think about life. This information can help others choose the care that you would choose.  Why are advance directives important?  An advance directive helps you control your care. Although spoken wishes may be used, it is better to have your wishes written down. Spoken wishes can be misunderstood, or not followed. Treatments may be given even if you do not want them. An advance directive may make it easier for your family to make difficult choices about your care.   Weight Management   Why it is important to manage your weight:  Being overweight increases your risk of health conditions such as heart disease, high blood  pressure, type 2 diabetes, and certain types of cancer. It can also increase your risk for osteoarthritis, sleep apnea, and other respiratory problems. Aim for a slow, steady weight loss. Even a small amount of weight loss can lower your risk of health problems.  How to lose weight safely:  A safe and healthy way to lose weight is to eat fewer calories and get regular exercise. You can lose up about 1 pound a week by decreasing the number of calories you eat by 500 calories each day.   Healthy meal plan for weight management:  A healthy meal plan includes a variety of foods, contains fewer calories, and helps you stay healthy. A healthy meal plan includes the following:  Eat whole-grain foods more often.  A healthy meal plan should contain fiber. Fiber is the part of grains, fruits, and vegetables that is not broken down by your body. Whole-grain foods are healthy and provide extra fiber in your diet. Some examples of whole-grain foods are whole-wheat breads and pastas, oatmeal, brown rice, and bulgur.  Eat a variety of vegetables every day.  Include dark, leafy greens such as spinach, kale, connie greens, and mustard greens. Eat yellow and orange vegetables such as carrots, sweet potatoes, and winter squash.   Eat a variety of fruits every day.  Choose fresh or canned fruit (canned in its own juice or light syrup) instead of juice. Fruit juice has very little or no fiber.  Eat low-fat dairy foods.  Drink fat-free (skim) milk or 1% milk. Eat fat-free yogurt and low-fat cottage cheese. Try low-fat cheeses such as mozzarella and other reduced-fat cheeses.  Choose meat and other protein foods that are low in fat.  Choose beans or other legumes such as split peas or lentils. Choose fish, skinless poultry (chicken or turkey), or lean cuts of red meat (beef or pork). Before you cook meat or poultry, cut off any visible fat.   Use less fat and oil.  Try baking foods instead of frying them. Add less fat, such as margarine,  sour cream, regular salad dressing and mayonnaise to foods. Eat fewer high-fat foods. Some examples of high-fat foods include french fries, doughnuts, ice cream, and cakes.  Eat fewer sweets.  Limit foods and drinks that are high in sugar. This includes candy, cookies, regular soda, and sweetened drinks.  Exercise:  Exercise at least 30 minutes per day on most days of the week. Some examples of exercise include walking, biking, dancing, and swimming. You can also fit in more physical activity by taking the stairs instead of the elevator or parking farther away from stores. Ask your healthcare provider about the best exercise plan for you.      © Copyright [employer] 2018 Information is for End User's use only and may not be sold, redistributed or otherwise used for commercial purposes. All illustrations and images included in CareNotes® are the copyrighted property of [employer] Inc[employer] or [employer]

## 2023-12-21 NOTE — PROGRESS NOTES
Assessment and Plan:     Problem List Items Addressed This Visit    None  Visit Diagnoses       Encounter for annual wellness visit (AWV) in Medicare patient    -  Primary    Prostate cancer screening        Relevant Orders    PSA, Total Screen          BMI Counseling: Body mass index is 32.8 kg/m². The BMI is above normal. Nutrition recommendations include encouraging healthy choices of fruits and vegetables, limiting drinks that contain sugar and moderation in carbohydrate intake. Exercise recommendations include exercising 3-5 times per week. Rationale for BMI follow-up plan is due to patient being overweight or obese.     Depression Screening and Follow-up Plan: Patient was screened for depression during today's encounter. They screened negative with a PHQ-2 score of 0.      Preventive health issues were discussed with patient, and age appropriate screening tests were ordered as noted in patient's After Visit Summary.  Personalized health advice and appropriate referrals for health education or preventive services given if needed, as noted in patient's After Visit Summary.     History of Present Illness:     Patient presents for a Medicare Wellness Visit    74 y/o male with hx of htn, cad, hld, DM presents for f/u and AWV  Pt reports he was told he has glaucoma in R eye  Bp controlled   Pt due for psa and aorta u/s - reviewed with pt     Discussed flu vaccine and covid booster - declines  Pt declines shingles vaccine  Discussed tdap - pt will check on price        Patient Care Team:  Chanelle Eason PA-C as PCP - General (Physician Assistant)  Keila Cabral MD as PCP - PCP-[facility] ([facility])  Clay Edmonds MD as PCP - PCP-[facility] ([facility])  Clemente Muller OD (Optometry)  Seda Kaufman MD (Endocrinology)     Review of Systems:     Review of Systems   Constitutional:  Negative for activity change, appetite change, chills, diaphoresis and fever.   HENT:  Negative for congestion and sore throat.     Eyes:  Negative for pain and itching.   Respiratory:  Negative for cough and shortness of breath.    Cardiovascular:  Negative for chest pain and leg swelling.   Gastrointestinal:  Negative for abdominal pain, constipation, diarrhea and nausea.   Genitourinary:  Negative for dysuria and frequency.   Musculoskeletal:  Negative for arthralgias and back pain.   Skin:  Negative for rash.   Neurological:  Negative for dizziness, light-headedness and headaches.   Psychiatric/Behavioral:  Negative for sleep disturbance. The patient is not nervous/anxious.         Problem List:     Patient Active Problem List   Diagnosis    Allergic rhinitis    Anxiety    Basal cell carcinoma of face    Benign essential hypertension    Coronary artery disease with history of myocardial infarction without history of CABG    Hyperlipidemia associated with type 2 diabetes mellitus     Type 2 diabetes mellitus with hyperglycemia, without long-term current use of insulin (HCC)    Welcome to Medicare preventive visit    Elevated bilirubin    BMI 34.0-34.9,adult    Skin lesion    Atypical nevi    Lipoma of left upper extremity- large    Diabetic neuropathy associated with type 2 diabetes mellitus (HCC)      Past Medical and Surgical History:     Past Medical History:   Diagnosis Date    Cellulitis     last assessed 7/3/13    Diabetes (HCC)     last assessed 4/10/13    Gastroenteritis     last assessed 3/30/17    High cholesterol     Hypertension     Rhus dermatitis     last assessed 8/26/15    Skin lesion of face     last assessed 3/30/17     Past Surgical History:   Procedure Laterality Date    ROTATOR CUFF REPAIR  2005      Family History:     Family History   Problem Relation Age of Onset    No Known Problems Family     Diabetes unspecified Mother     No Known Problems Father       Social History:     Social History     Socioeconomic History    Marital status: Single     Spouse name: None    Number of children: None    Years of education:  None    Highest education level: None   Occupational History    None   Tobacco Use    Smoking status: Former     Current packs/day: 0.00     Average packs/day: 0.5 packs/day for 9.9 years (5.0 ttl pk-yrs)     Types: Cigarettes, Cigars     Start date:      Quit date: 1989     Years since quittin.0    Smokeless tobacco: Never   Vaping Use    Vaping status: Never Used   Substance and Sexual Activity    Alcohol use: Yes     Comment: rare- 1 if out to eat- rarely     Drug use: No    Sexual activity: Not Currently   Other Topics Concern    None   Social History Narrative    None     Social Determinants of Health     Financial Resource Strain: Low Risk  (2023)    Overall Financial Resource Strain (CARDIA)     Difficulty of Paying Living Expenses: Not very hard   Food Insecurity: Not on file   Transportation Needs: No Transportation Needs (2023)    PRAPARE - Transportation     Lack of Transportation (Medical): No     Lack of Transportation (Non-Medical): No   Physical Activity: Not on file   Stress: Not on file   Social Connections: Not on file   Intimate Partner Violence: Not on file   Housing Stability: Not on file      Medications and Allergies:     Current Outpatient Medications   Medication Sig Dispense Refill    amLODIPine-benazepril (LOTREL) 10-20 MG per capsule Take 1 capsule by mouth daily 90 capsule 1    atorvastatin (LIPITOR) 40 mg tablet TAKE 1 TABLET BY MOUTH EVERY DAY 90 tablet 1    BD Pen Needle Manju 2nd Gen 32G X 4 MM MISC Use daily 100 each 1    brimonidine (ALPHAGAN P) 0.1 % Administer to the right eye 3 (three) times a day      glimepiride (AMARYL) 2 mg tablet TAKE 1 TABLET BY MOUTH EVERY DAY WITH BREAKFAST AND TAKE 2 TABLETS WITH DINNER 270 tablet 1    glucose blood (OneTouch Verio) test strip Test BG 2x daily as directed 200 each 3    Jardiance 10 MG TABS tablet TAKE 1 TABLET BY MOUTH EVERY DAY 90 tablet 1    Lancets (OneTouch Delica Plus [id number]) MISC Test BG 2x daily as  directed 200 each 3    liraglutide (Victoza) injection Inject 1.8 mg daily 27 mL 1    metFORMIN (GLUCOPHAGE) 1000 MG tablet TAKE 1 TABLET BY MOUTH TWICE A  tablet 1    Naproxen Sodium (ALEVE PO) Take by mouth as needed       No current facility-administered medications for this visit.     Allergies   Allergen Reactions    Januvia [Sitagliptin] Other (See Comments)     Unknown reaction, patient can't recall      Immunizations:     Immunization History   Administered Date(s) Administered    COVID-19 PFIZER VACCINE 0.3 ML IM 04/28/2021, 05/19/2021      Health Maintenance:         Topic Date Due    Colorectal Cancer Screening  01/15/2026    Hepatitis C Screening  Completed         Topic Date Due    Pneumococcal Vaccine: 65+ Years (1 - PCV) Never done    Influenza Vaccine (1) Never done    COVID-19 Vaccine (3 - 2023-24 season) 09/01/2023      Medicare Screening Tests and Risk Assessments:     Tomas is here for his Subsequent Wellness visit. Last Medicare Wellness visit information reviewed, patient interviewed and updates made to the record today.      Health Risk Assessment:   Patient rates overall health as good. Patient feels that their physical health rating is same. Patient is satisfied with their life. Eyesight was rated as slightly worse. Hearing was rated as same. Patient feels that their emotional and mental health rating is same. Patients states they are never, rarely angry. Patient states they are never, rarely unusually tired/fatigued. Pain experienced in the last 7 days has been none. Patient states that he has experienced no weight loss or gain in last 6 months.     Depression Screening:   PHQ-2 Score: 0      Fall Risk Screening:   In the past year, patient has experienced: no history of falling in past year      Home Safety:  Patient does not have trouble with stairs inside or outside of their home. Patient has working smoke alarms and has working carbon monoxide detector. Home safety hazards  include: none.     Nutrition:   Current diet is Diabetic.     Medications:   Patient is currently taking over-the-counter supplements. OTC medications include: see medication list. Patient is able to manage medications.     Activities of Daily Living (ADLs)/Instrumental Activities of Daily Living (IADLs):   Walk and transfer into and out of bed and chair?: Yes  Dress and groom yourself?: Yes    Bathe or shower yourself?: Yes    Feed yourself? Yes  Do your laundry/housekeeping?: Yes  Manage your money, pay your bills and track your expenses?: Yes  Make your own meals?: Yes    Do your own shopping?: Yes    Previous Hospitalizations:   Any hospitalizations or ED visits within the last 12 months?: No      Advance Care Planning:   Living will: No    Advanced directive: No      Cognitive Screening:   Provider or family/friend/caregiver concerned regarding cognition?: No    PREVENTIVE SCREENINGS      Cardiovascular Screening:    General: Screening Not Indicated and History Lipid Disorder      Diabetes Screening:     General: Screening Not Indicated and History Diabetes      Colorectal Cancer Screening:     General: Screening Current      Prostate Cancer Screening:    General: Risks and Benefits Discussed    Due for: PSA      Osteoporosis Screening:    General: Screening Not Indicated      Abdominal Aortic Aneurysm (AAA) Screening:    Risk factors include: age between 65-74 yo and tobacco use      Due for: Screening AAA Ultrasound      Lung Cancer Screening:     General: Screening Not Indicated      Hepatitis C Screening:    General: Screening Current    Screening, Brief Intervention, and Referral to Treatment (SBIRT)    Screening  Typical number of drinks in a day: 0  Typical number of drinks in a week: 0  Interpretation: Low risk drinking behavior.    Single Item Drug Screening:  How often have you used an illegal drug (including marijuana) or a prescription medication for non-medical reasons in the past year?  "never    Single Item Drug Screen Score: 0  Interpretation: Negative screen for possible drug use disorder    Brief Intervention  Alcohol & drug use screenings were reviewed. No concerns regarding substance use disorder identified.     Other Counseling Topics:   Calcium and vitamin D intake and regular weightbearing exercise.     No results found.     Physical Exam:     /74 (BP Location: Left arm, Patient Position: Sitting, Cuff Size: Large)   Pulse 89   Temp (!) 97.1 °F (36.2 °C) (Temporal)   Ht 6' 1.5\" (1.867 m)   Wt 114 kg (252 lb)   SpO2 97% Comment: room air  BMI 32.80 kg/m²     Physical Exam  Vitals reviewed.   Constitutional:       General: He is not in acute distress.  HENT:      Head: Normocephalic and atraumatic.      Right Ear: Tympanic membrane, ear canal and external ear normal. There is no impacted cerumen.      Left Ear: Tympanic membrane, ear canal and external ear normal. There is no impacted cerumen.      Nose: Nose normal.   Eyes:      General:         Right eye: No discharge.         Left eye: No discharge.      Extraocular Movements: Extraocular movements intact.      Conjunctiva/sclera: Conjunctivae normal.      Pupils: Pupils are equal, round, and reactive to light.   Cardiovascular:      Rate and Rhythm: Normal rate and regular rhythm.   Pulmonary:      Effort: Pulmonary effort is normal. No respiratory distress.      Breath sounds: Normal breath sounds. No wheezing or rales.   Abdominal:      General: Bowel sounds are normal. There is no distension.   Musculoskeletal:         General: Normal range of motion.      Cervical back: Normal range of motion.      Right lower leg: No edema.      Left lower leg: No edema.   Lymphadenopathy:      Cervical: No cervical adenopathy.   Skin:     General: Skin is warm.      Findings: No erythema or rash.   Neurological:      General: No focal deficit present.      Mental Status: He is alert and oriented to person, place, and time.   Psychiatric: "         Mood and Affect: Mood normal.         Behavior: Behavior normal.          Chanelle Eason PA-C

## 2024-01-11 ENCOUNTER — TELEPHONE (OUTPATIENT)
Dept: INTERNAL MEDICINE CLINIC | Facility: OTHER | Age: 74
End: 2024-01-11

## 2024-01-11 DIAGNOSIS — Z87.891 HISTORY OF TOBACCO USE: ICD-10-CM

## 2024-01-11 DIAGNOSIS — Z87.891 HISTORY OF TOBACCO ABUSE: ICD-10-CM

## 2024-01-11 DIAGNOSIS — I10 BENIGN ESSENTIAL HYPERTENSION: Primary | ICD-10-CM

## 2024-01-16 ENCOUNTER — TELEPHONE (OUTPATIENT)
Dept: ADMINISTRATIVE | Facility: OTHER | Age: 74
End: 2024-01-16

## 2024-01-16 NOTE — TELEPHONE ENCOUNTER
Upon review of the In Basket request and the patient's chart, initial outreach has been made via telephone call to facility. Please see Contacts section for details.     Thank you  Lilliam Velazquez

## 2024-01-16 NOTE — TELEPHONE ENCOUNTER
As a follow-up, a second attempt has been made for outreach via fax to facility. Please see Contacts section for details.    Thank you  Lilliam Velazquez

## 2024-01-16 NOTE — TELEPHONE ENCOUNTER
Upon review of the In Basket request we were able to locate, review, and update the patient chart as requested for Diabetic Eye Exam.    Any additional questions or concerns should be emailed to the Practice Liaisons via the appropriate education email address, please do not reply via In Basket.    Thank you  Lilliam Velazquez

## 2024-01-16 NOTE — LETTER
Received report but only the right eye is being addressed.    Diabetic Eye Exam Form    Date Requested: 24  Patient: Tomas Cabrera  Patient : 1950   Referring Provider: Chanelle Eason PA-C      DIABETIC Eye Exam Date _______________________________      Type of Exam MUST be documented for Diabetic Eye Exams. Please CHECK ONE.     Retinal Exam       Dilated Retinal Exam       OCT       Optomap-Iris Exam      Fundus Photography       Left Eye - Please check Retinopathy or No Retinopathy        Exam did show retinopathy    Exam did not show retinopathy       Right Eye - Please check Retinopathy or No Retinopathy       Exam did show retinopathy    Exam did not show retinopathy       Comments __________________________________________________________    Practice Providing Exam ______________________________________________    Exam Performed By (print name) _______________________________________      Provider Signature ___________________________________________________      These reports are needed for  compliance.  Please fax this completed form and a copy of the Diabetic Eye Exam report to our office located at [address] [place] [address] as soon as possible via Fax [phone] attention [place]illi[place]: Phone [phone]  We thank you for your assistance in treating our mutual patient.

## 2024-01-23 ENCOUNTER — OFFICE VISIT (OUTPATIENT)
Dept: INTERNAL MEDICINE CLINIC | Age: 74
End: 2024-01-23
Payer: COMMERCIAL

## 2024-01-23 VITALS
HEIGHT: 74 IN | OXYGEN SATURATION: 98 % | HEART RATE: 88 BPM | DIASTOLIC BLOOD PRESSURE: 60 MMHG | BODY MASS INDEX: 32.73 KG/M2 | SYSTOLIC BLOOD PRESSURE: 138 MMHG | TEMPERATURE: 98.2 F | WEIGHT: 255 LBS

## 2024-01-23 DIAGNOSIS — J20.9 ACUTE BRONCHITIS, UNSPECIFIED ORGANISM: Primary | ICD-10-CM

## 2024-01-23 DIAGNOSIS — L30.9 DERMATITIS: ICD-10-CM

## 2024-01-23 DIAGNOSIS — E78.5 HYPERLIPIDEMIA ASSOCIATED WITH TYPE 2 DIABETES MELLITUS: ICD-10-CM

## 2024-01-23 DIAGNOSIS — E11.69 HYPERLIPIDEMIA ASSOCIATED WITH TYPE 2 DIABETES MELLITUS: ICD-10-CM

## 2024-01-23 PROCEDURE — 99214 OFFICE O/P EST MOD 30 MIN: CPT | Performed by: PHYSICIAN ASSISTANT

## 2024-01-23 RX ORDER — PREDNISONE 20 MG/1
20 TABLET ORAL DAILY
Qty: 5 TABLET | Refills: 0 | Status: SHIPPED | OUTPATIENT
Start: 2024-01-23

## 2024-01-23 RX ORDER — AZITHROMYCIN 250 MG/1
TABLET, FILM COATED ORAL DAILY
Qty: 6 TABLET | Refills: 0 | Status: SHIPPED | OUTPATIENT
Start: 2024-01-23 | End: 2024-01-28

## 2024-01-23 RX ORDER — NYSTATIN 100000 U/G
CREAM TOPICAL 2 TIMES DAILY
Qty: 60 G | Refills: 1 | Status: SHIPPED | OUTPATIENT
Start: 2024-01-23

## 2024-01-23 NOTE — PROGRESS NOTES
Assessment/Plan:         Diagnoses and all orders for this visit:    Acute bronchitis, unspecified organism  Comments:  short pred course  Orders:  -     azithromycin (Zithromax) 250 mg tablet; Take 2 tablets (500 mg total) by mouth daily for 1 day, THEN 1 tablet (250 mg total) daily for 4 days.    Dermatitis  Comments:  pred daily in am (take with food)  apply nystatin to affected area, f/u in 2-3 days if sx not improved  Orders:  -     predniSONE 20 mg tablet; Take 1 tablet (20 mg total) by mouth daily  -     nystatin (MYCOSTATIN) cream; Apply topically 2 (two) times a day    Hyperlipidemia associated with type 2 diabetes mellitus   Comments:  continue atorvastatin          Subjective:      Patient ID: Tomas Cabrera is a 73 y.o. male.    Pt c/o rash on groin, hands, arms and neck which started about 2 weeks ago.  Patient used different laundry detergent and was shoveling.   Patient states itches.  Patient has been taking Benadryl. Pt relates this to sweating when he was shoveling.   Pt denies contacts with similar rashes  Has not been working outside lately other than shoveling snow  Itchy at times     Pt c/o cough x 2 weeks or so - states it was worse last week but still going on intermittently as well   He denies associated fever, chills, sore throat, sinus pressure and ear pain     Rash  Associated symptoms include coughing. Pertinent negatives include no congestion, diarrhea, fatigue, fever, shortness of breath or vomiting.       The following portions of the patient's history were reviewed and updated as appropriate: allergies, current medications, past family history, past medical history, past social history, past surgical history, and problem list.    Review of Systems   Constitutional:  Negative for activity change, appetite change, chills, fatigue and fever.   HENT:  Positive for postnasal drip. Negative for congestion, sinus pressure and sinus pain.    Eyes:  Negative for pain and redness.    Respiratory:  Positive for cough. Negative for chest tightness, shortness of breath, wheezing and stridor.    Cardiovascular:  Negative for chest pain and leg swelling.   Gastrointestinal:  Negative for constipation, diarrhea and vomiting.   Musculoskeletal:  Negative for arthralgias and back pain.   Skin:  Positive for rash.   Neurological:  Negative for dizziness, numbness and headaches.   Psychiatric/Behavioral:  Negative for sleep disturbance. The patient is not nervous/anxious.          Past Medical History:   Diagnosis Date    Cellulitis     last assessed 7/3/13    Diabetes (HCC)     last assessed 4/10/13    Gastroenteritis     last assessed 3/30/17    High cholesterol     Hypertension     Rhus dermatitis     last assessed 8/26/15    Skin lesion of face     last assessed 3/30/17         Current Outpatient Medications:     amLODIPine-benazepril (LOTREL) 10-20 MG per capsule, Take 1 capsule by mouth daily, Disp: 90 capsule, Rfl: 1    atorvastatin (LIPITOR) 40 mg tablet, TAKE 1 TABLET BY MOUTH EVERY DAY, Disp: 90 tablet, Rfl: 1    azithromycin (Zithromax) 250 mg tablet, Take 2 tablets (500 mg total) by mouth daily for 1 day, THEN 1 tablet (250 mg total) daily for 4 days., Disp: 6 tablet, Rfl: 0    BD Pen Needle Manju 2nd Gen 32G X 4 MM MISC, Use daily, Disp: 100 each, Rfl: 1    brimonidine (ALPHAGAN P) 0.1 %, Administer to the right eye 3 (three) times a day, Disp: , Rfl:     glimepiride (AMARYL) 2 mg tablet, TAKE 1 TABLET BY MOUTH EVERY DAY WITH BREAKFAST AND TAKE 2 TABLETS WITH DINNER, Disp: 270 tablet, Rfl: 1    glucose blood (OneTouch Verio) test strip, Test BG 2x daily as directed, Disp: 200 each, Rfl: 3    Jardiance 10 MG TABS tablet, TAKE 1 TABLET BY MOUTH EVERY DAY, Disp: 90 tablet, Rfl: 1    Lancets (OneTouch Delica Plus [id number]) MISC, Test BG 2x daily as directed, Disp: 200 each, Rfl: 3    liraglutide (Victoza) injection, Inject 1.8 mg daily, Disp: 27 mL, Rfl: 1    metFORMIN (GLUCOPHAGE) 1000 MG  "tablet, TAKE 1 TABLET BY MOUTH TWICE A DAY, Disp: 180 tablet, Rfl: 1    Naproxen Sodium (ALEVE PO), Take by mouth as needed, Disp: , Rfl:     nystatin (MYCOSTATIN) cream, Apply topically 2 (two) times a day, Disp: 60 g, Rfl: 1    predniSONE 20 mg tablet, Take 1 tablet (20 mg total) by mouth daily, Disp: 5 tablet, Rfl: 0    Allergies   Allergen Reactions    Januvia [Sitagliptin] Other (See Comments)     Unknown reaction, patient can't recall       Social History   Past Surgical History:   Procedure Laterality Date    ROTATOR CUFF REPAIR  2005     Family History   Problem Relation Age of Onset    No Known Problems Family     Diabetes unspecified Mother     No Known Problems Father        Objective:  /60 (BP Location: Left arm, Patient Position: Sitting, Cuff Size: Large)   Pulse 88   Temp 98.2 °F (36.8 °C) (Temporal)   Ht 6' 1.5\" (1.867 m)   Wt 116 kg (255 lb)   SpO2 98%   BMI 33.19 kg/m²        Physical Exam  Vitals reviewed.   Constitutional:       General: He is not in acute distress.  HENT:      Head: Normocephalic and atraumatic.      Right Ear: Tympanic membrane, ear canal and external ear normal.      Left Ear: Tympanic membrane, ear canal and external ear normal.      Nose: Nose normal.   Eyes:      General:         Right eye: No discharge.         Left eye: No discharge.      Extraocular Movements: Extraocular movements intact.      Conjunctiva/sclera: Conjunctivae normal.      Pupils: Pupils are equal, round, and reactive to light.   Cardiovascular:      Rate and Rhythm: Normal rate and regular rhythm.   Pulmonary:      Effort: Pulmonary effort is normal. No respiratory distress.      Breath sounds: Normal breath sounds. No wheezing or rales.   Skin:     Findings: Rash (erythematous papular rash - flexural surfaces, groin, elbow and small area interiginous areas L hand) present. No bruising.   Neurological:      General: No focal deficit present.      Mental Status: He is alert and oriented to " person, place, and time.   Psychiatric:         Mood and Affect: Mood normal.         Behavior: Behavior normal.

## 2024-01-31 ENCOUNTER — APPOINTMENT (OUTPATIENT)
Dept: RADIOLOGY | Age: 74
End: 2024-01-31
Payer: COMMERCIAL

## 2024-01-31 ENCOUNTER — TELEPHONE (OUTPATIENT)
Dept: INTERNAL MEDICINE CLINIC | Age: 74
End: 2024-01-31

## 2024-01-31 DIAGNOSIS — R05.1 ACUTE COUGH: Primary | ICD-10-CM

## 2024-01-31 DIAGNOSIS — R05.1 ACUTE COUGH: ICD-10-CM

## 2024-01-31 PROCEDURE — 71046 X-RAY EXAM CHEST 2 VIEWS: CPT

## 2024-01-31 RX ORDER — DEXTROMETHORPHAN HYDROBROMIDE AND PROMETHAZINE HYDROCHLORIDE 15; 6.25 MG/5ML; MG/5ML
5 SYRUP ORAL 4 TIMES DAILY PRN
Qty: 240 ML | Refills: 0 | Status: SHIPPED | OUTPATIENT
Start: 2024-01-31 | End: 2024-02-08

## 2024-01-31 NOTE — TELEPHONE ENCOUNTER
Called and spoke with patient.   He stated she will go to [facility].   Patient also informed for script at the pharmacy for his cough.     Thank you

## 2024-02-07 ENCOUNTER — TELEPHONE (OUTPATIENT)
Dept: INTERNAL MEDICINE CLINIC | Age: 74
End: 2024-02-07

## 2024-02-07 NOTE — TELEPHONE ENCOUNTER
Patient has been taking Promethazine cough syrup since 1/31/24 and has had no improvement with cough.  Please advise.

## 2024-02-08 ENCOUNTER — OFFICE VISIT (OUTPATIENT)
Dept: INTERNAL MEDICINE CLINIC | Age: 74
End: 2024-02-08
Payer: COMMERCIAL

## 2024-02-08 VITALS
SYSTOLIC BLOOD PRESSURE: 136 MMHG | BODY MASS INDEX: 32.85 KG/M2 | WEIGHT: 256 LBS | HEART RATE: 89 BPM | OXYGEN SATURATION: 95 % | TEMPERATURE: 98.4 F | DIASTOLIC BLOOD PRESSURE: 64 MMHG | HEIGHT: 74 IN

## 2024-02-08 DIAGNOSIS — R05.1 ACUTE COUGH: ICD-10-CM

## 2024-02-08 DIAGNOSIS — R09.82 PND (POST-NASAL DRIP): ICD-10-CM

## 2024-02-08 DIAGNOSIS — J20.9 ACUTE BRONCHITIS, UNSPECIFIED ORGANISM: Primary | ICD-10-CM

## 2024-02-08 DIAGNOSIS — K21.9 GASTROESOPHAGEAL REFLUX DISEASE WITHOUT ESOPHAGITIS: ICD-10-CM

## 2024-02-08 PROCEDURE — 99214 OFFICE O/P EST MOD 30 MIN: CPT | Performed by: PHYSICIAN ASSISTANT

## 2024-02-08 RX ORDER — LEVOCETIRIZINE DIHYDROCHLORIDE 5 MG/1
5 TABLET, FILM COATED ORAL EVERY EVENING
Qty: 30 TABLET | Refills: 1 | Status: SHIPPED | OUTPATIENT
Start: 2024-02-08

## 2024-02-08 RX ORDER — BENZONATATE 100 MG/1
100 CAPSULE ORAL 3 TIMES DAILY PRN
Qty: 30 CAPSULE | Refills: 0 | Status: SHIPPED | OUTPATIENT
Start: 2024-02-08 | End: 2024-02-08 | Stop reason: SDUPTHER

## 2024-02-08 RX ORDER — FAMOTIDINE 20 MG/1
20 TABLET, FILM COATED ORAL
Qty: 30 TABLET | Refills: 0 | Status: SHIPPED | OUTPATIENT
Start: 2024-02-08

## 2024-02-08 RX ORDER — FAMOTIDINE 20 MG/1
20 TABLET, FILM COATED ORAL
Qty: 30 TABLET | Refills: 0 | Status: SHIPPED | OUTPATIENT
Start: 2024-02-08 | End: 2024-02-08 | Stop reason: SDUPTHER

## 2024-02-08 RX ORDER — LEVOCETIRIZINE DIHYDROCHLORIDE 5 MG/1
5 TABLET, FILM COATED ORAL EVERY EVENING
Qty: 30 TABLET | Refills: 1 | Status: SHIPPED | OUTPATIENT
Start: 2024-02-08 | End: 2024-02-08

## 2024-02-08 RX ORDER — ALBUTEROL SULFATE 90 UG/1
2 AEROSOL, METERED RESPIRATORY (INHALATION) EVERY 6 HOURS PRN
Qty: 8.5 G | Refills: 0 | Status: SHIPPED | OUTPATIENT
Start: 2024-02-08

## 2024-02-08 RX ORDER — BENZONATATE 100 MG/1
100 CAPSULE ORAL 3 TIMES DAILY PRN
Qty: 30 CAPSULE | Refills: 0 | Status: SHIPPED | OUTPATIENT
Start: 2024-02-08

## 2024-02-08 RX ORDER — ALBUTEROL SULFATE 90 UG/1
2 AEROSOL, METERED RESPIRATORY (INHALATION) EVERY 6 HOURS PRN
Qty: 8.5 G | Refills: 0 | Status: SHIPPED | OUTPATIENT
Start: 2024-02-08 | End: 2024-02-08 | Stop reason: SDUPTHER

## 2024-02-08 NOTE — PROGRESS NOTES
Assessment/Plan:         Diagnoses and all orders for this visit:    Acute bronchitis, unspecified organism  Comments:  +tessalon prn  albuterol q 8 hrs prn  Orders:  -     Discontinue: albuterol (ProAir HFA) 90 mcg/act inhaler; Inhale 2 puffs every 6 (six) hours as needed for wheezing (cough)  -     albuterol (ProAir HFA) 90 mcg/act inhaler; Inhale 2 puffs every 6 (six) hours as needed for wheezing (cough)    Acute cough  -     Discontinue: albuterol (ProAir HFA) 90 mcg/act inhaler; Inhale 2 puffs every 6 (six) hours as needed for wheezing (cough)  -     Discontinue: benzonatate (TESSALON PERLES) 100 mg capsule; Take 1 capsule (100 mg total) by mouth 3 (three) times a day as needed for cough  -     albuterol (ProAir HFA) 90 mcg/act inhaler; Inhale 2 puffs every 6 (six) hours as needed for wheezing (cough)  -     benzonatate (TESSALON PERLES) 100 mg capsule; Take 1 capsule (100 mg total) by mouth 3 (three) times a day as needed for cough    PND (post-nasal drip)  Comments:  intolerant of nasal sprays  +xyzal at bedtime  Orders:  -     Discontinue: levocetirizine (XYZAL) 5 MG tablet; Take 1 tablet (5 mg total) by mouth every evening  -     levocetirizine (XYZAL) 5 MG tablet; Take 1 tablet (5 mg total) by mouth every evening    Gastroesophageal reflux disease without esophagitis  Comments:  trial pepcid  Orders:  -     Discontinue: famotidine (PEPCID) 20 mg tablet; Take 1 tablet (20 mg total) by mouth daily with breakfast  -     famotidine (PEPCID) 20 mg tablet; Take 1 tablet (20 mg total) by mouth daily with breakfast        Remove carpet from bedroom   Increase fluids  Avoid laying down after eating     Subjective:      Patient ID: Tomas Cabrera is a 73 y.o. male.    74 y/o male f/u today for persistent cough  He was seen 1/23/24 for bronchitis - treated with zpak and pred course - pt reports no improvement with this  Cough persistent, wakes up from sleep   CXR negative, pt denies sore throat, night sweats,  fevers   Pt denies sob/wheezing   No relief with promethazine dm     Pt reports getting a new rug around 2 months ago from his neighbor   He has been using this in his bedroom and not sure if this could be affecting him     Cough  Associated symptoms include postnasal drip. Pertinent negatives include no chest pain, chills, ear pain, eye redness, fever, headaches, sore throat, shortness of breath or wheezing.       The following portions of the patient's history were reviewed and updated as appropriate: allergies, current medications, past family history, past medical history, past social history, past surgical history, and problem list.    Review of Systems   Constitutional:  Negative for activity change, appetite change, chills, fatigue and fever.   HENT:  Positive for postnasal drip. Negative for congestion, ear pain and sore throat.    Eyes:  Negative for pain and redness.   Respiratory:  Positive for cough. Negative for chest tightness, shortness of breath and wheezing.    Cardiovascular:  Negative for chest pain and leg swelling.   Gastrointestinal:  Negative for abdominal pain, constipation, diarrhea and nausea.   Musculoskeletal:  Negative for arthralgias and back pain.   Neurological:  Negative for dizziness, light-headedness and headaches.   Psychiatric/Behavioral:  Negative for sleep disturbance. The patient is not nervous/anxious.          Past Medical History:   Diagnosis Date    Cellulitis     last assessed 7/3/13    Diabetes (HCC)     last assessed 4/10/13    Gastroenteritis     last assessed 3/30/17    High cholesterol     Hypertension     Rhus dermatitis     last assessed 8/26/15    Skin lesion of face     last assessed 3/30/17         Current Outpatient Medications:     albuterol (ProAir HFA) 90 mcg/act inhaler, Inhale 2 puffs every 6 (six) hours as needed for wheezing (cough), Disp: 8.5 g, Rfl: 0    amLODIPine-benazepril (LOTREL) 10-20 MG per capsule, Take 1 capsule by mouth daily, Disp: 90  capsule, Rfl: 1    atorvastatin (LIPITOR) 40 mg tablet, TAKE 1 TABLET BY MOUTH EVERY DAY, Disp: 90 tablet, Rfl: 1    BD Pen Needle Manju 2nd Gen 32G X 4 MM MISC, Use daily, Disp: 100 each, Rfl: 1    benzonatate (TESSALON PERLES) 100 mg capsule, Take 1 capsule (100 mg total) by mouth 3 (three) times a day as needed for cough, Disp: 30 capsule, Rfl: 0    brimonidine (ALPHAGAN P) 0.1 %, Administer to the right eye 3 (three) times a day, Disp: , Rfl:     famotidine (PEPCID) 20 mg tablet, Take 1 tablet (20 mg total) by mouth daily with breakfast, Disp: 30 tablet, Rfl: 0    glimepiride (AMARYL) 2 mg tablet, TAKE 1 TABLET BY MOUTH EVERY DAY WITH BREAKFAST AND TAKE 2 TABLETS WITH DINNER, Disp: 270 tablet, Rfl: 1    glucose blood (OneTouch Verio) test strip, Test BG 2x daily as directed, Disp: 200 each, Rfl: 3    Jardiance 10 MG TABS tablet, TAKE 1 TABLET BY MOUTH EVERY DAY, Disp: 90 tablet, Rfl: 1    Lancets (OneTouch Delica Plus [id number]) MISC, Test BG 2x daily as directed, Disp: 200 each, Rfl: 3    levocetirizine (XYZAL) 5 MG tablet, Take 1 tablet (5 mg total) by mouth every evening, Disp: 30 tablet, Rfl: 1    liraglutide (Victoza) injection, Inject 1.8 mg daily, Disp: 27 mL, Rfl: 1    metFORMIN (GLUCOPHAGE) 1000 MG tablet, TAKE 1 TABLET BY MOUTH TWICE A DAY, Disp: 180 tablet, Rfl: 1    Naproxen Sodium (ALEVE PO), Take by mouth as needed, Disp: , Rfl:     nystatin (MYCOSTATIN) cream, Apply topically 2 (two) times a day, Disp: 60 g, Rfl: 1    Allergies   Allergen Reactions    Januvia [Sitagliptin] Other (See Comments)     Unknown reaction, patient can't recall       Social History   Past Surgical History:   Procedure Laterality Date    ROTATOR CUFF REPAIR  2005     Family History   Problem Relation Age of Onset    No Known Problems Family     Diabetes unspecified Mother     No Known Problems Father        Objective:  /64 (BP Location: Left arm, Patient Position: Sitting, Cuff Size: Large)   Pulse 89   Temp 98.4 °F  "(36.9 °C) (Temporal)   Ht 6' 1.5\" (1.867 m)   Wt 116 kg (256 lb)   SpO2 95%   BMI 33.32 kg/m²        Physical Exam  Vitals reviewed.   Constitutional:       General: He is not in acute distress.  HENT:      Head: Normocephalic and atraumatic.      Right Ear: Tympanic membrane, ear canal and external ear normal. There is no impacted cerumen.      Left Ear: Tympanic membrane, ear canal and external ear normal. There is no impacted cerumen.      Nose: Nose normal. No congestion or rhinorrhea.      Mouth/Throat:      Mouth: Mucous membranes are moist.      Pharynx: No oropharyngeal exudate or posterior oropharyngeal erythema.   Eyes:      General:         Right eye: No discharge.         Left eye: No discharge.      Extraocular Movements: Extraocular movements intact.      Conjunctiva/sclera: Conjunctivae normal.      Pupils: Pupils are equal, round, and reactive to light.   Cardiovascular:      Rate and Rhythm: Normal rate and regular rhythm.   Pulmonary:      Effort: Pulmonary effort is normal. No respiratory distress.      Breath sounds: Normal breath sounds. No wheezing, rhonchi or rales.   Musculoskeletal:         General: Normal range of motion.      Cervical back: Normal range of motion.      Right lower leg: No edema.      Left lower leg: No edema.   Neurological:      Mental Status: He is alert.         "

## 2024-02-15 DIAGNOSIS — E11.65 TYPE 2 DIABETES MELLITUS WITH HYPERGLYCEMIA, WITHOUT LONG-TERM CURRENT USE OF INSULIN (HCC): ICD-10-CM

## 2024-02-15 RX ORDER — PEN NEEDLE, DIABETIC 32GX 5/32"
NEEDLE, DISPOSABLE MISCELLANEOUS
Qty: 100 EACH | Refills: 1 | Status: SHIPPED | OUTPATIENT
Start: 2024-02-15

## 2024-02-21 PROBLEM — Z00.00 WELCOME TO MEDICARE PREVENTIVE VISIT: Status: RESOLVED | Noted: 2018-04-05 | Resolved: 2024-02-21

## 2024-03-08 ENCOUNTER — FOLLOW UP (OUTPATIENT)
Dept: URBAN - METROPOLITAN AREA CLINIC 6 | Facility: CLINIC | Age: 74
End: 2024-03-08

## 2024-03-08 DIAGNOSIS — Z96.1: ICD-10-CM

## 2024-03-08 DIAGNOSIS — H53.2: ICD-10-CM

## 2024-03-08 DIAGNOSIS — H40.013: ICD-10-CM

## 2024-03-08 DIAGNOSIS — H40.053: ICD-10-CM

## 2024-03-08 LAB
DIPLOPIA-BTEA- ARCNJ: NORMAL

## 2024-03-08 PROCEDURE — 92012 INTRM OPH EXAM EST PATIENT: CPT

## 2024-03-08 PROCEDURE — 92133 CPTRZD OPH DX IMG PST SGM ON: CPT

## 2024-03-08 ASSESSMENT — KERATOMETRY
OS_AXISANGLE2_DEGREES: 86
OD_K1POWER_DIOPTERS: 41.25
OS_AXISANGLE_DEGREES: 176
OD_AXISANGLE_DEGREES: 180
OS_K1POWER_DIOPTERS: 41.25
OD_AXISANGLE2_DEGREES: 90
OD_K2POWER_DIOPTERS: 41.25
OS_K2POWER_DIOPTERS: 41.75

## 2024-03-08 ASSESSMENT — TONOMETRY
OS_IOP_MMHG: 16
OD_IOP_MMHG: 18

## 2024-03-08 ASSESSMENT — VISUAL ACUITY
OD_SC: 20/20
OS_SC: 20/25+1

## 2024-03-11 ENCOUNTER — NEW PATIENT (OUTPATIENT)
Dept: URBAN - METROPOLITAN AREA CLINIC 6 | Facility: CLINIC | Age: 74
End: 2024-03-11

## 2024-03-11 DIAGNOSIS — H53.2: ICD-10-CM

## 2024-03-11 DIAGNOSIS — H49.11: ICD-10-CM

## 2024-03-11 LAB
LEFT EYE DIABETIC RETINOPATHY: POSITIVE
RIGHT EYE DIABETIC RETINOPATHY: POSITIVE

## 2024-03-11 PROCEDURE — 92012 INTRM OPH EXAM EST PATIENT: CPT

## 2024-03-11 PROCEDURE — 92060 SENSORIMOTOR EXAMINATION: CPT

## 2024-03-11 ASSESSMENT — KERATOMETRY
OS_K2POWER_DIOPTERS: 41.75
OD_AXISANGLE_DEGREES: 180
OS_AXISANGLE2_DEGREES: 86
OD_K1POWER_DIOPTERS: 41.25
OS_AXISANGLE_DEGREES: 176
OD_K2POWER_DIOPTERS: 41.25
OD_AXISANGLE2_DEGREES: 90
OS_K1POWER_DIOPTERS: 41.25

## 2024-03-11 ASSESSMENT — VISUAL ACUITY
OD_SC: 20/25-1
OS_SC: 20/25-2

## 2024-03-11 ASSESSMENT — TONOMETRY
OD_IOP_MMHG: 14
OS_IOP_MMHG: 12

## 2024-04-05 ENCOUNTER — HOSPITAL ENCOUNTER (OUTPATIENT)
Dept: RADIOLOGY | Facility: IMAGING CENTER | Age: 74
End: 2024-04-05
Payer: COMMERCIAL

## 2024-04-05 DIAGNOSIS — Z87.891 HISTORY OF TOBACCO ABUSE: ICD-10-CM

## 2024-04-05 DIAGNOSIS — I10 BENIGN ESSENTIAL HYPERTENSION: ICD-10-CM

## 2024-04-05 DIAGNOSIS — Z87.891 HISTORY OF TOBACCO USE: ICD-10-CM

## 2024-04-05 PROCEDURE — 76706 US ABDL AORTA SCREEN AAA: CPT

## 2024-04-11 ENCOUNTER — RA CDI HCC (OUTPATIENT)
Dept: OTHER | Facility: HOSPITAL | Age: 74
End: 2024-04-11

## 2024-04-14 DIAGNOSIS — E11.65 TYPE 2 DIABETES MELLITUS WITH HYPERGLYCEMIA, WITHOUT LONG-TERM CURRENT USE OF INSULIN (HCC): ICD-10-CM

## 2024-04-15 LAB
BASOPHILS # BLD AUTO: 78 CELLS/UL (ref 0–200)
BASOPHILS NFR BLD AUTO: 1 %
BUN SERPL-MCNC: 17 MG/DL (ref 7–25)
BUN/CREAT SERPL: ABNORMAL (CALC) (ref 6–22)
CALCIUM SERPL-MCNC: 9.4 MG/DL (ref 8.6–10.3)
CHLORIDE SERPL-SCNC: 102 MMOL/L (ref 98–110)
CHOLEST SERPL-MCNC: 122 MG/DL
CHOLEST/HDLC SERPL: 2.8 (CALC)
CO2 SERPL-SCNC: 26 MMOL/L (ref 20–32)
CREAT SERPL-MCNC: 0.87 MG/DL (ref 0.7–1.28)
EOSINOPHIL # BLD AUTO: 764 CELLS/UL (ref 15–500)
EOSINOPHIL NFR BLD AUTO: 9.8 %
ERYTHROCYTE [DISTWIDTH] IN BLOOD BY AUTOMATED COUNT: 13.1 % (ref 11–15)
GFR/BSA.PRED SERPLBLD CYS-BASED-ARV: 92 ML/MIN/1.73M2
GLUCOSE SERPL-MCNC: 134 MG/DL (ref 65–99)
HBA1C MFR BLD: 7.3 % OF TOTAL HGB
HCT VFR BLD AUTO: 54.2 % (ref 38.5–50)
HDLC SERPL-MCNC: 44 MG/DL
HGB BLD-MCNC: 18.5 G/DL (ref 13.2–17.1)
LDLC SERPL CALC-MCNC: 52 MG/DL (CALC)
LYMPHOCYTES # BLD AUTO: 1708 CELLS/UL (ref 850–3900)
LYMPHOCYTES NFR BLD AUTO: 21.9 %
MCH RBC QN AUTO: 31.9 PG (ref 27–33)
MCHC RBC AUTO-ENTMCNC: 34.1 G/DL (ref 32–36)
MCV RBC AUTO: 93.4 FL (ref 80–100)
MONOCYTES # BLD AUTO: 702 CELLS/UL (ref 200–950)
MONOCYTES NFR BLD AUTO: 9 %
NEUTROPHILS # BLD AUTO: 4547 CELLS/UL (ref 1500–7800)
NEUTROPHILS NFR BLD AUTO: 58.3 %
NONHDLC SERPL-MCNC: 78 MG/DL (CALC)
PLATELET # BLD AUTO: 178 THOUSAND/UL (ref 140–400)
PMV BLD REES-ECKER: 9.7 FL (ref 7.5–12.5)
POTASSIUM SERPL-SCNC: 4.1 MMOL/L (ref 3.5–5.3)
PSA SERPL-MCNC: 3.49 NG/ML
RBC # BLD AUTO: 5.8 MILLION/UL (ref 4.2–5.8)
SODIUM SERPL-SCNC: 139 MMOL/L (ref 135–146)
TRIGL SERPL-MCNC: 189 MG/DL
WBC # BLD AUTO: 7.8 THOUSAND/UL (ref 3.8–10.8)

## 2024-04-15 RX ORDER — LIRAGLUTIDE 6 MG/ML
INJECTION SUBCUTANEOUS
Qty: 27 ML | Refills: 1 | Status: SHIPPED | OUTPATIENT
Start: 2024-04-15

## 2024-04-16 ENCOUNTER — RA CDI HCC (OUTPATIENT)
Dept: OTHER | Facility: HOSPITAL | Age: 74
End: 2024-04-16

## 2024-04-17 NOTE — PROGRESS NOTES
HCC coding opportunities     E11.3291     Chart Reviewed number of suggestions sent to Provider: 1   GR    Patients Insurance     Medicare Insurance: [employer]

## 2024-04-23 NOTE — PROGRESS NOTES
Patient Progress Note      CC: DM      Referring Provider  Chanelle Eason Pa-c  [address]  [address]  [place] [address]     History of Present Illness:   Tomas Cabrera is a 73 y.o. male with a history of type 2 diabetes without long term use of insulin. Diabetes course has been stable. Complications of DM: CAD, neuropathy. Denies recent illness or hospitalizations. Denies recent severe hypoglycemic or severe hyperglycemic episodes. Denies any issues with his current regimen. Home glucose monitoring: are performed sporadically     Home blood glucose readings:  119-146 mg/dl (sporadic readings for April)     Current regimen: metformin 1000 mg BID, glimepiride  2 mg with breakfast and 4 mg with dinner, Victoza 1.8 mg daily, Jardiance 10 mg daily  compliant most of the time, denies any side effects from medications.  Injects in: abdomen. Rotates sites: Yes  Hypoglycemic episodes: No, rare  H/o of hypoglycemia causing hospitalization or Intervention such as glucagon injection  or ambulance call :  No  Hypoglycemia symptoms: jitteriness  Treatment of hypoglycemia: candy      Medic alert tag: recommended: Yes     Diet: 3 meals per day, 2 snacks per day. Timing of meals is predictable.   Diabetic diet compliance:  noncompliant some of the time  Activity: Daily activity is predictable: Yes. No routine exercise. He will be more active during the summer.     Ophthamology: March 2024  Podiatry: June 2023     Has hypertension: on ACE inhibitor/ARB, compliant most of the time  Has hyperlipidemia: on statin - tolerating well, no myalgias. compliant most of the time, denies any side effects from medications.  Thyroid disorders: No  History of pancreatitis: No      Patient Active Problem List   Diagnosis    Allergic rhinitis    Anxiety    Basal cell carcinoma of face    Benign essential hypertension    Coronary artery disease with history of myocardial infarction without history of CABG    Mixed hyperlipidemia     Type 2 diabetes mellitus with hyperglycemia, without long-term current use of insulin (HCC)    Elevated bilirubin    BMI 34.0-34.9,adult    Skin lesion    Atypical nevi    Lipoma of left upper extremity- large    Diabetic neuropathy associated with type 2 diabetes mellitus (HCC)      Past Medical History:   Diagnosis Date    Cellulitis     last assessed 7/3/13    Diabetes (HCC)     last assessed 4/10/13    Gastroenteritis     last assessed 3/30/17    High cholesterol     Hypertension     Rhus dermatitis     last assessed 8/26/15    Skin lesion of face     last assessed 3/30/17      Past Surgical History:   Procedure Laterality Date    ROTATOR CUFF REPAIR        Family History   Problem Relation Age of Onset    No Known Problems Family     Diabetes unspecified Mother     No Known Problems Father      Social History     Tobacco Use    Smoking status: Former     Current packs/day: 0.00     Average packs/day: 0.5 packs/day for 9.9 years (5.0 ttl pk-yrs)     Types: Cigarettes, Cigars     Start date:      Quit date: 1989     Years since quittin.3    Smokeless tobacco: Never   Substance Use Topics    Alcohol use: Yes     Comment: rare- 1 if out to eat- rarely      Allergies   Allergen Reactions    Januvia [Sitagliptin] Other (See Comments)     Unknown reaction, patient can't recall         Current Outpatient Medications:     albuterol (ProAir HFA) 90 mcg/act inhaler, Inhale 2 puffs every 6 (six) hours as needed for wheezing (cough), Disp: 8.5 g, Rfl: 0    amLODIPine-benazepril (LOTREL) 10-20 MG per capsule, Take 1 capsule by mouth daily, Disp: 90 capsule, Rfl: 1    atorvastatin (LIPITOR) 40 mg tablet, TAKE 1 TABLET BY MOUTH EVERY DAY, Disp: 90 tablet, Rfl: 1    BD Pen Needle Manju 2nd Gen 32G X 4 MM MISC, Use daily, Disp: 100 each, Rfl: 1    benzonatate (TESSALON PERLES) 100 mg capsule, Take 1 capsule (100 mg total) by mouth 3 (three) times a day as needed for cough, Disp: 30 capsule, Rfl: 0     "brimonidine (ALPHAGAN P) 0.1 %, Administer to the right eye 3 (three) times a day, Disp: , Rfl:     famotidine (PEPCID) 20 mg tablet, Take 1 tablet (20 mg total) by mouth daily with breakfast, Disp: 30 tablet, Rfl: 0    glimepiride (AMARYL) 2 mg tablet, TAKE 1 TABLET BY MOUTH EVERY DAY WITH BREAKFAST AND TAKE 2 TABLETS WITH DINNER, Disp: 270 tablet, Rfl: 1    glucose blood (OneTouch Verio) test strip, Test BG 2x daily as directed, Disp: 200 each, Rfl: 3    Jardiance 10 MG TABS tablet, TAKE 1 TABLET BY MOUTH EVERY DAY, Disp: 90 tablet, Rfl: 1    Lancets (OneTouch Delica Plus [id number]) MISC, Test BG 2x daily as directed, Disp: 200 each, Rfl: 3    levocetirizine (XYZAL) 5 MG tablet, Take 1 tablet (5 mg total) by mouth every evening, Disp: 30 tablet, Rfl: 1    metFORMIN (GLUCOPHAGE) 1000 MG tablet, TAKE 1 TABLET BY MOUTH TWICE A DAY, Disp: 180 tablet, Rfl: 1    Naproxen Sodium (ALEVE PO), Take by mouth as needed, Disp: , Rfl:     nystatin (MYCOSTATIN) cream, Apply topically 2 (two) times a day, Disp: 60 g, Rfl: 1    Victoza injection, Inject 1.8 mg daily, Disp: 27 mL, Rfl: 1  Review of Systems   Constitutional:  Negative for activity change, appetite change, fatigue and unexpected weight change.   HENT:  Negative for trouble swallowing.    Eyes:  Positive for visual disturbance (diplopia after cataract surgery).   Respiratory:  Negative for shortness of breath.    Cardiovascular:  Negative for chest pain and palpitations.   Gastrointestinal:  Negative for constipation and diarrhea.   Endocrine: Negative for polydipsia and polyuria.   Musculoskeletal: Negative.    Skin:  Negative for wound.   Neurological:  Positive for numbness.   Psychiatric/Behavioral: Negative.         Physical Exam:  Body mass index is 34.04 kg/m².  /78   Pulse 80   Ht 6' 1\" (1.854 m)   Wt 117 kg (258 lb)   SpO2 96%   BMI 34.04 kg/m²    Wt Readings from Last 3 Encounters:   04/24/24 117 kg (258 lb)   02/08/24 116 kg (256 lb)   01/23/24 " 116 kg (255 lb)       Physical Exam  Vitals and nursing note reviewed.   Constitutional:       Appearance: He is well-developed.   HENT:      Head: Normocephalic.   Eyes:      General: No scleral icterus.     Pupils: Pupils are equal, round, and reactive to light.   Neck:      Thyroid: No thyromegaly.   Cardiovascular:      Rate and Rhythm: Normal rate and regular rhythm.      Pulses:           Radial pulses are 2+ on the right side and 2+ on the left side.      Heart sounds: No murmur heard.  Pulmonary:      Effort: Pulmonary effort is normal. No respiratory distress.      Breath sounds: Normal breath sounds. No wheezing.   Musculoskeletal:      Cervical back: Neck supple.   Skin:     General: Skin is warm and dry.   Neurological:      Mental Status: He is alert.       Patient's shoes and socks were not removed.          Labs:   Component      Latest Ref Rng 9/11/2023 4/15/2024   GLUCOSE      65 - 99 mg/dL 105 (H)  134 (H)    BUN      7 - 25 mg/dL 17  17    Creatinine      0.70 - 1.28 mg/dL 0.85  0.87    GFR, Calculated      > OR = 60 mL/min/1.73m2 92  92    SL AMB BUN/CREATININE RATIO      6 - 22 (calc) SEE NOTE:  SEE NOTE:    Sodium      135 - 146 mmol/L 142  139    Potassium      3.5 - 5.3 mmol/L 4.0  4.1    Chloride      98 - 110 mmol/L 108  102    Carbon Dioxide      20 - 32 mmol/L 24  26    Calcium      8.6 - 10.3 mg/dL 9.1  9.4    Cholesterol      <200 mg/dL  122    HDL      > OR = 40 mg/dL  44    Triglycerides      <150 mg/dL  189 (H)    LDL Calculated      mg/dL (calc)  52    Chol/HDL Ratio      <5.0 (calc)  2.8    Non-HDL Cholesterol      <130 mg/dL (calc)  78    EXT Creatinine Urine      20 - 320 mg/dL 77     Albumin,U,Random      See Note: mg/dL 0.5     Albumin Creat Ratio      <30 mcg/mg creat 6     Hemoglobin A1C      <5.7 % of total Hgb 6.7 (H)  7.3 (H)       Legend:  (H) High      Plan:    Diagnoses and all orders for this visit:    Type 2 diabetes mellitus with hyperglycemia, without long-term  current use of insulin ([facility])  HGA1C 7.3%. Worsened.  Treatment regimen: continue current treatment. He is going to work on lifestyle modifications.   Episodes of hypoglycemia can lead to permanent disability and death.  Discussed risks/complications associated with uncontrolled diabetes.    Advised to adhere to diabetic diet, and recommended staying active/exercising routinely as tolerated.  Keep carbohydrates consistent to limit blood glucose fluctuations.  Advised to call if blood sugars less than 70 mg/dl or over 300 mg/dl.   Check blood glucose 1-2 times a day  Discussed symptoms and treatment of hypoglycemia.   Recommended routine follow-up with podiatry and ophthalmology.   Send log in 2 weeks.    Ordered blood work to complete prior to next visit.  -     Hemoglobin A1C; Future  -     Albumin / creatinine urine ratio; Future  -     Basic metabolic panel; Future    Benign essential hypertension  Blood pressure adequately controlled, continue current treatment    Mixed hyperlipidemia  LDL 52  Continue statin therapy        Discussed with the patient diagnosis and treatment and all questions fully answered. He will call me if any problems arise.    Counseled patient on diagnostic results, prognosis, risk and benefit of treatment options, instruction for management, importance of treatment compliance, risk factor reduction and impressions.      Jackie Flores PA-C

## 2024-04-23 NOTE — PATIENT INSTRUCTIONS
Hypoglycemia instructions   Tomas Cabrera  4/23/2024  [id number]    Low Blood Sugar    Steps to treat low blood sugar.    1. Test blood sugar if you have symptoms of low blood sugar:   Low Blood Sugar Symptoms:  o Sweaty  o Dizzy  o Rapid heartbeat  o Shaky  o Bad mood  o Hungry      2. Treat blood sugar less than 70 with 15 grams of fast-acting carbohydrate:   Examples of 15 grams Fast-Acting Carbohydrate:  o 4 oz juice  o 4 oz regular soda  o 3-4 glucose tablets (chew)  o 3-4 hard candies (chew)          3.  Wait 15 minutes and test your blood sugar again     4. If blood sugar is less than 100, repeat steps 2-3.    5. When your blood sugar is 100 or more, eat a snack if it will be longer than one hour until your next meal. The snack should be 15 grams of carbohydrate and a protein:   Examples of snacks:  o ½ sandwich  o 6 crackers with cheese  o Piece of fruit with cheese or peanut butter  o 6 crackers with peanut butter

## 2024-04-24 ENCOUNTER — OFFICE VISIT (OUTPATIENT)
Dept: ENDOCRINOLOGY | Facility: CLINIC | Age: 74
End: 2024-04-24
Payer: COMMERCIAL

## 2024-04-24 ENCOUNTER — OFFICE VISIT (OUTPATIENT)
Dept: INTERNAL MEDICINE CLINIC | Age: 74
End: 2024-04-24
Payer: COMMERCIAL

## 2024-04-24 VITALS
HEIGHT: 73 IN | WEIGHT: 258 LBS | HEART RATE: 95 BPM | TEMPERATURE: 98.6 F | SYSTOLIC BLOOD PRESSURE: 136 MMHG | DIASTOLIC BLOOD PRESSURE: 66 MMHG | OXYGEN SATURATION: 95 % | BODY MASS INDEX: 34.19 KG/M2

## 2024-04-24 VITALS
HEIGHT: 73 IN | OXYGEN SATURATION: 96 % | DIASTOLIC BLOOD PRESSURE: 78 MMHG | HEART RATE: 80 BPM | BODY MASS INDEX: 34.19 KG/M2 | WEIGHT: 258 LBS | SYSTOLIC BLOOD PRESSURE: 130 MMHG

## 2024-04-24 DIAGNOSIS — D58.2 ELEVATED HEMOGLOBIN (HCC): ICD-10-CM

## 2024-04-24 DIAGNOSIS — E78.2 MIXED HYPERLIPIDEMIA: ICD-10-CM

## 2024-04-24 DIAGNOSIS — E11.65 TYPE 2 DIABETES MELLITUS WITH HYPERGLYCEMIA, WITHOUT LONG-TERM CURRENT USE OF INSULIN (HCC): ICD-10-CM

## 2024-04-24 DIAGNOSIS — E11.49 OTHER DIABETIC NEUROLOGICAL COMPLICATION ASSOCIATED WITH TYPE 2 DIABETES MELLITUS (HCC): ICD-10-CM

## 2024-04-24 DIAGNOSIS — I10 BENIGN ESSENTIAL HYPERTENSION: ICD-10-CM

## 2024-04-24 DIAGNOSIS — E11.8 TYPE 2 DIABETES MELLITUS WITH COMPLICATION (HCC): ICD-10-CM

## 2024-04-24 DIAGNOSIS — E11.65 TYPE 2 DIABETES MELLITUS WITH HYPERGLYCEMIA, WITHOUT LONG-TERM CURRENT USE OF INSULIN (HCC): Primary | ICD-10-CM

## 2024-04-24 PROCEDURE — 99214 OFFICE O/P EST MOD 30 MIN: CPT | Performed by: PHYSICIAN ASSISTANT

## 2024-04-24 RX ORDER — GLIMEPIRIDE 2 MG/1
TABLET ORAL
Qty: 270 TABLET | Refills: 1
Start: 2024-04-24

## 2024-04-24 NOTE — PROGRESS NOTES
Name: Tomas Cabrera      : 1950      MRN: [id number]  Encounter Provider: Chanelle Eason PA-C  Encounter Date: 2024   Encounter department: [facility]    Assessment & Plan     1. Type 2 diabetes mellitus with hyperglycemia, without long-term current use of insulin (HCC)  Comments:  follows with endocrine   discussed diet and exercise    2. Elevated hemoglobin (HCC)  Comments:  repeat cbc  Orders:  -     CBC and differential; Future    3. Benign essential hypertension  Comments:  well controlled    4. Other diabetic neurological complication associated with type 2 diabetes mellitus (HCC)    5. Mixed hyperlipidemia  Comments:  continue atorvastatin           Subjective      74 y/o male with hx of DM, htn, hld presents for f/u    Pt states since his cataract surgery he has had diplopia (both eyes) pt reports it only occurs with both eyes open  He states he is being sent to a new ophthalmologist (within in the same office) for further evaluation   Pt states his last exam showed normal eye pressure     Pt follows with endocrine for DM  Note reviewed   Hga1c 7.3 (slightly elevated from previous)    Labs reviewed  Hgb slightly elevated   No further coughing       Review of Systems   Constitutional:  Negative for activity change, appetite change, chills, diaphoresis and fever.   HENT:  Negative for congestion and sore throat.    Eyes:  Negative for pain and redness.   Respiratory:  Negative for cough and shortness of breath.    Cardiovascular:  Negative for chest pain, palpitations and leg swelling.   Gastrointestinal:  Negative for abdominal pain, constipation, diarrhea and nausea.   Genitourinary:  Negative for dysuria and frequency.   Musculoskeletal:  Negative for arthralgias, back pain and gait problem.   Skin:  Negative for rash and wound.   Allergic/Immunologic: Negative for environmental allergies and food allergies.   Neurological:  Positive for numbness (tingling  "in both feet on and off). Negative for dizziness and headaches.   Psychiatric/Behavioral:  Negative for sleep disturbance. The patient is not nervous/anxious.        Current Outpatient Medications on File Prior to Visit   Medication Sig    albuterol (ProAir HFA) 90 mcg/act inhaler Inhale 2 puffs every 6 (six) hours as needed for wheezing (cough)    amLODIPine-benazepril (LOTREL) 10-20 MG per capsule Take 1 capsule by mouth daily    atorvastatin (LIPITOR) 40 mg tablet TAKE 1 TABLET BY MOUTH EVERY DAY    BD Pen Needle Manju 2nd Gen 32G X 4 MM MISC Use daily    brimonidine (ALPHAGAN P) 0.1 % Administer to the right eye 3 (three) times a day    famotidine (PEPCID) 20 mg tablet Take 1 tablet (20 mg total) by mouth daily with breakfast    glucose blood (OneTouch Verio) test strip Test BG 2x daily as directed    Lancets (OneTouch Delica Plus [id number]) MISC Test BG 2x daily as directed    Naproxen Sodium (ALEVE PO) Take by mouth as needed    nystatin (MYCOSTATIN) cream Apply topically 2 (two) times a day    Victoza injection Inject 1.8 mg daily    [DISCONTINUED] levocetirizine (XYZAL) 5 MG tablet Take 1 tablet (5 mg total) by mouth every evening    [DISCONTINUED] benzonatate (TESSALON PERLES) 100 mg capsule Take 1 capsule (100 mg total) by mouth 3 (three) times a day as needed for cough (Patient not taking: Reported on 4/24/2024)    [DISCONTINUED] glimepiride (AMARYL) 2 mg tablet TAKE 1 TABLET BY MOUTH EVERY DAY WITH BREAKFAST AND TAKE 2 TABLETS WITH DINNER    [DISCONTINUED] Jardiance 10 MG TABS tablet TAKE 1 TABLET BY MOUTH EVERY DAY    [DISCONTINUED] metFORMIN (GLUCOPHAGE) 1000 MG tablet TAKE 1 TABLET BY MOUTH TWICE A DAY       Objective     /66 (BP Location: Left arm, Patient Position: Sitting, Cuff Size: Standard)   Pulse 95   Temp 98.6 °F (37 °C) (Temporal)   Ht 6' 1\" (1.854 m)   Wt 117 kg (258 lb)   SpO2 95%   BMI 34.04 kg/m²     Physical Exam  Vitals reviewed.   Constitutional:       General: He is not in " acute distress.  HENT:      Head: Normocephalic and atraumatic.      Right Ear: Tympanic membrane, ear canal and external ear normal. There is no impacted cerumen.      Left Ear: Tympanic membrane, ear canal and external ear normal. There is no impacted cerumen.      Nose: Nose normal.      Mouth/Throat:      Mouth: Mucous membranes are moist.   Eyes:      General:         Right eye: No discharge.         Left eye: No discharge.      Extraocular Movements: Extraocular movements intact.      Conjunctiva/sclera: Conjunctivae normal.      Pupils: Pupils are equal, round, and reactive to light.   Cardiovascular:      Rate and Rhythm: Normal rate and regular rhythm.      Pulses: no weak pulses.           Dorsalis pedis pulses are 2+ on the right side and 2+ on the left side.        Posterior tibial pulses are 2+ on the right side and 2+ on the left side.   Pulmonary:      Effort: Pulmonary effort is normal. No respiratory distress.      Breath sounds: Normal breath sounds. No wheezing or rales.   Abdominal:      General: Bowel sounds are normal.   Musculoskeletal:      Cervical back: Normal range of motion.      Right lower leg: No edema.      Left lower leg: No edema.   Feet:      Right foot:      Skin integrity: Dry skin present. No ulcer, skin breakdown, erythema, warmth or callus.      Left foot:      Skin integrity: Dry skin present. No ulcer, skin breakdown, erythema, warmth or callus.   Skin:     Findings: No rash.   Neurological:      General: No focal deficit present.      Mental Status: He is alert and oriented to person, place, and time.   Psychiatric:         Mood and Affect: Mood normal.       Chanelle Eaosn PA-C  Diabetic Foot Exam    Patient's shoes and socks removed.    Right Foot/Ankle   Right Foot Inspection  Skin Exam: skin normal, skin intact and dry skin. No warmth, no callus, no erythema, no maceration, no abnormal color, no pre-ulcer, no ulcer and no callus.     Toe Exam: ROM and strength within  normal limits. No swelling, no tenderness and erythema    Sensory   Monofilament testing: intact    Vascular  The right DP pulse is 2+. The right PT pulse is 2+.     Left Foot/Ankle  Left Foot Inspection  Skin Exam: skin normal, skin intact and dry skin. No warmth, no erythema, no maceration, normal color, no pre-ulcer, no ulcer and no callus.     Toe Exam: ROM and strength within normal limits. No swelling, no tenderness and no erythema.     Sensory   Monofilament testing: intact    Vascular  The left DP pulse is 2+. The left PT pulse is 2+.     Assign Risk Category  No deformity present  No loss of protective sensation  No weak pulses  Risk: 0

## 2024-05-03 ENCOUNTER — TELEPHONE (OUTPATIENT)
Age: 74
End: 2024-05-03

## 2024-05-03 NOTE — TELEPHONE ENCOUNTER
Patient called to let us know that [facility]  could not do the test he needed (lab  work ) in [place] and closest location that can do it is in [place]. Insurance is investigating the issue..

## 2024-05-13 DIAGNOSIS — E11.8 TYPE 2 DIABETES MELLITUS WITH COMPLICATION (HCC): ICD-10-CM

## 2024-05-13 DIAGNOSIS — E11.65 TYPE 2 DIABETES MELLITUS WITH HYPERGLYCEMIA, WITHOUT LONG-TERM CURRENT USE OF INSULIN (HCC): ICD-10-CM

## 2024-05-13 DIAGNOSIS — E78.2 MIXED HYPERLIPIDEMIA: ICD-10-CM

## 2024-05-13 RX ORDER — ATORVASTATIN CALCIUM 40 MG/1
TABLET, FILM COATED ORAL
Qty: 90 TABLET | Refills: 1 | Status: SHIPPED | OUTPATIENT
Start: 2024-05-13

## 2024-05-13 RX ORDER — GLIMEPIRIDE 2 MG/1
TABLET ORAL
Qty: 270 TABLET | Refills: 1 | Status: SHIPPED | OUTPATIENT
Start: 2024-05-13

## 2024-05-13 RX ORDER — EMPAGLIFLOZIN 10 MG/1
10 TABLET, FILM COATED ORAL DAILY
Qty: 90 TABLET | Refills: 1 | Status: SHIPPED | OUTPATIENT
Start: 2024-05-13

## 2024-05-16 DIAGNOSIS — I10 ESSENTIAL HYPERTENSION: ICD-10-CM

## 2024-05-16 NOTE — TELEPHONE ENCOUNTER
Medication: amLODIPine-benazepril (LOTREL) 10-20 MG per capsule     Dose/Frequency: Take 1 capsule by mouth daily     Quantity: 90 capsule     Pharmacy: [facility] #[id number] - [place], [place] - [address]     Office:   [x] PCP/Provider - Yumi   [] Speciality/Provider -     Does the patient have enough for 3 days?   [x] Yes   [] No - Send as HP to POD

## 2024-05-17 RX ORDER — AMLODIPINE BESYLATE AND BENAZEPRIL HYDROCHLORIDE 10; 20 MG/1; MG/1
1 CAPSULE ORAL DAILY
Qty: 90 CAPSULE | Refills: 1 | Status: SHIPPED | OUTPATIENT
Start: 2024-05-17

## 2024-06-04 ENCOUNTER — TELEPHONE (OUTPATIENT)
Age: 74
End: 2024-06-04

## 2024-06-11 ENCOUNTER — TELEPHONE (OUTPATIENT)
Age: 74
End: 2024-06-11

## 2024-06-11 NOTE — TELEPHONE ENCOUNTER
Pt called in requesting to speak to the  regarding an out of network referral. I sent a message via teams for Izabela to give pt a call back when she's available. Pt is aware.

## 2024-08-19 DIAGNOSIS — I10 ESSENTIAL HYPERTENSION: ICD-10-CM

## 2024-08-20 RX ORDER — AMLODIPINE AND BENAZEPRIL HYDROCHLORIDE 10; 20 MG/1; MG/1
1 CAPSULE ORAL DAILY
Qty: 100 CAPSULE | Refills: 1 | Status: SHIPPED | OUTPATIENT
Start: 2024-08-20

## 2024-09-05 LAB
ALBUMIN/CREAT UR: 7 MG/G CREAT
BASOPHILS # BLD AUTO: 59 CELLS/UL (ref 0–200)
BASOPHILS NFR BLD AUTO: 1 %
BUN SERPL-MCNC: 24 MG/DL (ref 7–25)
BUN/CREAT SERPL: ABNORMAL (CALC) (ref 6–22)
CALCIUM SERPL-MCNC: 8.9 MG/DL (ref 8.6–10.3)
CHLORIDE SERPL-SCNC: 108 MMOL/L (ref 98–110)
CO2 SERPL-SCNC: 25 MMOL/L (ref 20–32)
CREAT SERPL-MCNC: 0.77 MG/DL (ref 0.7–1.28)
CREAT UR-MCNC: 76 MG/DL (ref 20–320)
EOSINOPHIL # BLD AUTO: 702 CELLS/UL (ref 15–500)
EOSINOPHIL NFR BLD AUTO: 11.9 %
ERYTHROCYTE [DISTWIDTH] IN BLOOD BY AUTOMATED COUNT: 13 % (ref 11–15)
GFR/BSA.PRED SERPLBLD CYS-BASED-ARV: 95 ML/MIN/1.73M2
GLUCOSE SERPL-MCNC: 140 MG/DL (ref 65–99)
HBA1C MFR BLD: 6.6 % OF TOTAL HGB
HCT VFR BLD AUTO: 48.2 % (ref 38.5–50)
HGB BLD-MCNC: 16.6 G/DL (ref 13.2–17.1)
LYMPHOCYTES # BLD AUTO: 1428 CELLS/UL (ref 850–3900)
LYMPHOCYTES NFR BLD AUTO: 24.2 %
MCH RBC QN AUTO: 32 PG (ref 27–33)
MCHC RBC AUTO-ENTMCNC: 34.4 G/DL (ref 32–36)
MCV RBC AUTO: 93.1 FL (ref 80–100)
MICROALBUMIN UR-MCNC: 0.5 MG/DL
MONOCYTES # BLD AUTO: 484 CELLS/UL (ref 200–950)
MONOCYTES NFR BLD AUTO: 8.2 %
NEUTROPHILS # BLD AUTO: 3227 CELLS/UL (ref 1500–7800)
NEUTROPHILS NFR BLD AUTO: 54.7 %
PLATELET # BLD AUTO: 179 THOUSAND/UL (ref 140–400)
PMV BLD REES-ECKER: 9.7 FL (ref 7.5–12.5)
POTASSIUM SERPL-SCNC: 4.2 MMOL/L (ref 3.5–5.3)
RBC # BLD AUTO: 5.18 MILLION/UL (ref 4.2–5.8)
SODIUM SERPL-SCNC: 141 MMOL/L (ref 135–146)
WBC # BLD AUTO: 5.9 THOUSAND/UL (ref 3.8–10.8)

## 2024-09-11 ENCOUNTER — OFFICE VISIT (OUTPATIENT)
Dept: INTERNAL MEDICINE CLINIC | Age: 74
End: 2024-09-11
Payer: COMMERCIAL

## 2024-09-11 VITALS
OXYGEN SATURATION: 97 % | SYSTOLIC BLOOD PRESSURE: 134 MMHG | HEART RATE: 71 BPM | DIASTOLIC BLOOD PRESSURE: 62 MMHG | TEMPERATURE: 98.2 F | WEIGHT: 248 LBS | BODY MASS INDEX: 32.87 KG/M2 | HEIGHT: 73 IN

## 2024-09-11 DIAGNOSIS — E11.65 TYPE 2 DIABETES MELLITUS WITH HYPERGLYCEMIA, WITHOUT LONG-TERM CURRENT USE OF INSULIN (HCC): ICD-10-CM

## 2024-09-11 DIAGNOSIS — I10 BENIGN ESSENTIAL HYPERTENSION: ICD-10-CM

## 2024-09-11 DIAGNOSIS — F51.01 PRIMARY INSOMNIA: Primary | ICD-10-CM

## 2024-09-11 DIAGNOSIS — E78.2 MIXED HYPERLIPIDEMIA: ICD-10-CM

## 2024-09-11 PROCEDURE — G2211 COMPLEX E/M VISIT ADD ON: HCPCS | Performed by: PHYSICIAN ASSISTANT

## 2024-09-11 PROCEDURE — 99213 OFFICE O/P EST LOW 20 MIN: CPT | Performed by: PHYSICIAN ASSISTANT

## 2024-09-11 RX ORDER — RAMELTEON 8 MG/1
8 TABLET ORAL
Qty: 30 TABLET | Refills: 2 | Status: SHIPPED | OUTPATIENT
Start: 2024-09-11

## 2024-09-11 NOTE — PROGRESS NOTES
" Assessment/Plan:         Diagnoses and all orders for this visit:    Primary insomnia  Comments:  trial rozererm  Orders:  -     ramelteon (ROZEREM) 8 mg tablet; Take 1 tablet (8 mg total) by mouth daily at bedtime    Mixed hyperlipidemia    Type 2 diabetes mellitus with hyperglycemia, without long-term current use of insulin (HCC)  Comments:  well controlled  hga1c 6.6    Benign essential hypertension  Comments:  well controlled          Subjective:      Patient ID: Tomas Cabrera is a 74 y.o. male.    75 y/o male with hx of HLD, HTN, CAD, DM presents for f/u     Pt c/o difficulty sleeping - has difficulty falling asleep   Pt denies napping during the day   Believes this may be due to his neuropathy in his foot   Has tried gabapentin and requip     C/o \"pins and needles\" in L foot which is an ongoing problem - pt states he believes this is affecting his sleep     Pt has been following with endocrine, has f/u next month  Hgba1c 6.6 - improved   Pt has been watching sugars in diet better - eating less sweets           The following portions of the patient's history were reviewed and updated as appropriate: allergies, current medications, past family history, past medical history, past social history, past surgical history, and problem list.    Review of Systems   Constitutional:  Negative for activity change, appetite change, chills, diaphoresis, fatigue and fever.   HENT:  Negative for congestion and sore throat.    Eyes:  Negative for pain and redness.   Respiratory:  Negative for cough and shortness of breath.    Cardiovascular:  Negative for chest pain and leg swelling.   Gastrointestinal:  Negative for abdominal pain, constipation, diarrhea and nausea.   Genitourinary:  Negative for dysuria and frequency.   Musculoskeletal:  Negative for arthralgias, back pain and gait problem.   Skin:  Negative for rash.   Neurological:  Positive for numbness (feet, worse L foot). Negative for dizziness, light-headedness and " headaches.   Psychiatric/Behavioral:  Positive for sleep disturbance. The patient is not nervous/anxious.          Past Medical History:   Diagnosis Date    Cellulitis     last assessed 7/3/13    Diabetes (HCC)     last assessed 4/10/13    Gastroenteritis     last assessed 3/30/17    High cholesterol     Hypertension     Rhus dermatitis     last assessed 8/26/15    Skin lesion of face     last assessed 3/30/17         Current Outpatient Medications:     albuterol (ProAir HFA) 90 mcg/act inhaler, Inhale 2 puffs every 6 (six) hours as needed for wheezing (cough), Disp: 8.5 g, Rfl: 0    amLODIPine-benazepril (LOTREL) 10-20 MG per capsule, TAKE 1 CAPSULE BY MOUTH EVERY DAY, Disp: 100 capsule, Rfl: 1    atorvastatin (LIPITOR) 40 mg tablet, TAKE 1 TABLET BY MOUTH EVERY DAY, Disp: 90 tablet, Rfl: 1    BD Pen Needle Manju 2nd Gen 32G X 4 MM MISC, Use daily, Disp: 100 each, Rfl: 1    brimonidine (ALPHAGAN P) 0.1 %, Administer to the right eye 3 (three) times a day, Disp: , Rfl:     glimepiride (AMARYL) 2 mg tablet, TAKE 1 TABLET BY MOUTH EVERY DAY WITH BREAKFAST AND TAKE 2 TABLETS WITH DINNER, Disp: 270 tablet, Rfl: 1    glucose blood (OneTouch Verio) test strip, Test BG 2x daily as directed, Disp: 200 each, Rfl: 3    Jardiance 10 MG TABS tablet, TAKE 1 TABLET BY MOUTH EVERY DAY, Disp: 90 tablet, Rfl: 1    Lancets (OneTouch Delica Plus [id number]) MISC, Test BG 2x daily as directed, Disp: 200 each, Rfl: 3    metFORMIN (GLUCOPHAGE) 1000 MG tablet, TAKE 1 TABLET BY MOUTH TWICE A DAY, Disp: 180 tablet, Rfl: 1    Naproxen Sodium (ALEVE PO), Take by mouth as needed, Disp: , Rfl:     ramelteon (ROZEREM) 8 mg tablet, Take 1 tablet (8 mg total) by mouth daily at bedtime, Disp: 30 tablet, Rfl: 2    Victoza injection, Inject 1.8 mg daily, Disp: 27 mL, Rfl: 1    Allergies   Allergen Reactions    Januvia [Sitagliptin] Other (See Comments)     Unknown reaction, patient can't recall       Social History   Past Surgical History:   Procedure  "Laterality Date    ROTATOR CUFF REPAIR  2005     Family History   Problem Relation Age of Onset    No Known Problems Family     Diabetes unspecified Mother     No Known Problems Father        Objective:  /62 (BP Location: Left arm, Patient Position: Sitting, Cuff Size: Large)   Pulse 71   Temp 98.2 °F (36.8 °C) (Temporal)   Ht 6' 1\" (1.854 m)   Wt 112 kg (248 lb)   SpO2 97%   BMI 32.72 kg/m²        Physical Exam  Vitals reviewed.   Constitutional:       General: He is not in acute distress.  HENT:      Head: Normocephalic and atraumatic.      Right Ear: Tympanic membrane, ear canal and external ear normal. There is no impacted cerumen.      Left Ear: Tympanic membrane, ear canal and external ear normal. There is no impacted cerumen.      Mouth/Throat:      Mouth: Mucous membranes are moist.   Eyes:      General:         Right eye: No discharge.         Left eye: No discharge.      Extraocular Movements: Extraocular movements intact.      Conjunctiva/sclera: Conjunctivae normal.      Pupils: Pupils are equal, round, and reactive to light.   Cardiovascular:      Rate and Rhythm: Normal rate and regular rhythm.   Pulmonary:      Effort: Pulmonary effort is normal. No respiratory distress.      Breath sounds: Normal breath sounds. No wheezing or rales.   Musculoskeletal:      Cervical back: Normal range of motion.      Right lower leg: No edema.      Left lower leg: No edema.   Neurological:      General: No focal deficit present.      Mental Status: He is alert and oriented to person, place, and time.   Psychiatric:         Mood and Affect: Mood normal.         "

## 2024-10-01 ENCOUNTER — RA CDI HCC (OUTPATIENT)
Dept: OTHER | Facility: HOSPITAL | Age: 74
End: 2024-10-01

## 2024-10-01 NOTE — PROGRESS NOTES
HCC coding opportunities          Chart Reviewed number of suggestions sent to Provider: 1  E11.319     Patients Insurance     Medicare Insurance: [employer]

## 2024-10-02 DIAGNOSIS — E11.65 TYPE 2 DIABETES MELLITUS WITH HYPERGLYCEMIA, WITHOUT LONG-TERM CURRENT USE OF INSULIN (HCC): ICD-10-CM

## 2024-10-02 RX ORDER — LIRAGLUTIDE 6 MG/ML
INJECTION SUBCUTANEOUS
Qty: 27 ML | Refills: 1 | Status: SHIPPED | OUTPATIENT
Start: 2024-10-02 | End: 2024-10-11 | Stop reason: SDUPTHER

## 2024-10-03 ENCOUNTER — TELEPHONE (OUTPATIENT)
Age: 74
End: 2024-10-03

## 2024-10-03 NOTE — TELEPHONE ENCOUNTER
[phone] Jury Duty  Fax# [phone]   Address - [address]  Date of jury duty 10/8 so need asap this letter    Jury duty excuse letter due to diabetes meds causing him to urinate a lot doesn't think he can sit through it.

## 2024-10-08 DIAGNOSIS — E11.8 TYPE 2 DIABETES MELLITUS WITH COMPLICATION, WITH LONG-TERM CURRENT USE OF INSULIN (HCC): ICD-10-CM

## 2024-10-08 DIAGNOSIS — E11.8 TYPE 2 DIABETES MELLITUS WITH COMPLICATION (HCC): ICD-10-CM

## 2024-10-08 DIAGNOSIS — Z79.4 TYPE 2 DIABETES MELLITUS WITH COMPLICATION, WITH LONG-TERM CURRENT USE OF INSULIN (HCC): ICD-10-CM

## 2024-10-08 DIAGNOSIS — E11.65 TYPE 2 DIABETES MELLITUS WITH HYPERGLYCEMIA, WITHOUT LONG-TERM CURRENT USE OF INSULIN (HCC): ICD-10-CM

## 2024-10-08 RX ORDER — BLOOD SUGAR DIAGNOSTIC
STRIP MISCELLANEOUS
Qty: 200 EACH | Refills: 3 | Status: SHIPPED | OUTPATIENT
Start: 2024-10-08

## 2024-10-08 RX ORDER — PEN NEEDLE, DIABETIC 32GX 5/32"
NEEDLE, DISPOSABLE MISCELLANEOUS
Qty: 100 EACH | Refills: 1 | Status: SHIPPED | OUTPATIENT
Start: 2024-10-08

## 2024-10-08 RX ORDER — LANCETS 30 GAUGE
EACH MISCELLANEOUS
Qty: 200 EACH | Refills: 3 | Status: SHIPPED | OUTPATIENT
Start: 2024-10-08

## 2024-10-08 NOTE — TELEPHONE ENCOUNTER
A CGM will not be covered for him as he is not on insulin.  He can check with his insurance if a another glucometer is covered and we can try to send another glucometer/supplies to see if it works better for him.  He should also try to wash his hands with warm water prior to doing fingersticks. In the meantime, I did approve his prescriptions.

## 2024-10-08 NOTE — TELEPHONE ENCOUNTER
[facility] [address]      Pt wanted to know if there was an alternative for the lancets. He states that he is pricking his finger and has the tool at the highest setting but seems to be having a hard time getting blood out of it. Please call pt and advise

## 2024-10-11 ENCOUNTER — OFFICE VISIT (OUTPATIENT)
Dept: ENDOCRINOLOGY | Facility: CLINIC | Age: 74
End: 2024-10-11
Payer: COMMERCIAL

## 2024-10-11 VITALS
SYSTOLIC BLOOD PRESSURE: 136 MMHG | WEIGHT: 248 LBS | HEART RATE: 91 BPM | BODY MASS INDEX: 32.87 KG/M2 | DIASTOLIC BLOOD PRESSURE: 66 MMHG | OXYGEN SATURATION: 98 % | HEIGHT: 73 IN

## 2024-10-11 DIAGNOSIS — E11.8 TYPE 2 DIABETES MELLITUS WITH COMPLICATION (HCC): ICD-10-CM

## 2024-10-11 DIAGNOSIS — I25.2 CORONARY ARTERY DISEASE WITH HISTORY OF MYOCARDIAL INFARCTION WITHOUT HISTORY OF CABG: ICD-10-CM

## 2024-10-11 DIAGNOSIS — E53.8 VITAMIN B 12 DEFICIENCY: ICD-10-CM

## 2024-10-11 DIAGNOSIS — E78.2 MIXED HYPERLIPIDEMIA: ICD-10-CM

## 2024-10-11 DIAGNOSIS — E11.65 TYPE 2 DIABETES MELLITUS WITH HYPERGLYCEMIA, WITHOUT LONG-TERM CURRENT USE OF INSULIN (HCC): ICD-10-CM

## 2024-10-11 DIAGNOSIS — I25.10 CORONARY ARTERY DISEASE WITH HISTORY OF MYOCARDIAL INFARCTION WITHOUT HISTORY OF CABG: ICD-10-CM

## 2024-10-11 DIAGNOSIS — E11.65 TYPE 2 DIABETES MELLITUS WITH HYPERGLYCEMIA, WITHOUT LONG-TERM CURRENT USE OF INSULIN (HCC): Primary | ICD-10-CM

## 2024-10-11 DIAGNOSIS — I10 BENIGN ESSENTIAL HYPERTENSION: ICD-10-CM

## 2024-10-11 PROCEDURE — 99214 OFFICE O/P EST MOD 30 MIN: CPT | Performed by: INTERNAL MEDICINE

## 2024-10-11 RX ORDER — GLIMEPIRIDE 2 MG/1
TABLET ORAL
Qty: 270 TABLET | Refills: 1 | Status: SHIPPED | OUTPATIENT
Start: 2024-10-11

## 2024-10-11 RX ORDER — LIRAGLUTIDE 6 MG/ML
INJECTION SUBCUTANEOUS
Qty: 27 ML | Refills: 1 | Status: SHIPPED | OUTPATIENT
Start: 2024-10-11 | End: 2024-10-11

## 2024-10-11 RX ORDER — LIRAGLUTIDE 6 MG/ML
INJECTION SUBCUTANEOUS
Qty: 27 ML | Refills: 1 | Status: SHIPPED | OUTPATIENT
Start: 2024-10-11

## 2024-10-11 NOTE — PROGRESS NOTES
Tomas Cabrera 74 y.o. male MRN: [id number]    Encounter: [id number]      Assessment & Plan     Assessment:  This is a 74 y.o.-year-old male with diabetes with hyperglycemia.    Plan:  Diagnoses and all orders for this visit:    Type 2 diabetes mellitus with hyperglycemia, without long-term current use of insulin (HCC)  Lab Results   Component Value Date    HGBA1C 6.6 (H) 09/05/2024   Disease well-controlled, 6.6%.  Fasting blood sugars are usually in 90 to 120 mg per DL range.  Continue current management including Jardiance, Victoza, glimepiride and metformin  Patient is tolerating all medications well.  He admits compliance to diet as well as exercise routine.  Did about hypoglycemia symptoms and treatment  Follow-up in 6 months.    -     Ambulatory referral to Podiatry; Future  -     Empagliflozin (Jardiance) 10 MG TABS tablet; Take 1 tablet (10 mg total) by mouth daily  -     liraglutide (Victoza) injection; Inject 1.8 mg under the skin daily  -     Basic metabolic panel; Future  -     Albumin / creatinine urine ratio; Future  -     Hemoglobin A1C; Future    Mixed hyperlipidemia  DL is at goal, continue statins as per PCP, continue lifestyle modifications  Coronary artery disease with history of myocardial infarction without history of CABG  Stable findings, continue to follow-up with cardiology  Benign essential hypertension  Blood pressure well-controlled usually, continue current management  Type 2 diabetes mellitus with complication (HCC)  Continue current management.  A1c is at goal  -     metFORMIN (GLUCOPHAGE) 1000 MG tablet; Take 1 tablet (1,000 mg total) by mouth 2 (two) times a day  -     glimepiride (AMARYL) 2 mg tablet; TAKE 1 TABLET BY MOUTH EVERY DAY WITH BREAKFAST AND TAKE 2 TABLETS WITH DINNER    Vitamin B 12 deficiency  Start vitamin B12 500 mcg daily, as patient is having symptoms of neuropathy  -     Vitamin B12; Future    So discussed to start magnesium supplementation 400 mg daily at  night for sound sleep    CC: Diabetes    History of Present Illness     HPI:    Tomas Cabrera is 74-year-old male with medical history of type 2 diabetes without long-term use of insulin is here for follow-up.  Diabetes course has been stable.  Complications of diabetes are coronary artery disease, neuropathy.  He denies recent hospitalization for hyperglycemia or hypoglycemia.  Checks blood sugars 2 times daily and blood sugars are usually in 80 to 200 mg per DL range  Current regimen includes metformin 1000 mg twice a day, glimepiride 2 mg with breakfast and 4 mg with dinner, Victoza 1.8 mg daily and Jardiance 10 mg daily  Last A1c is   Lab Results   Component Value Date    HGBA1C 6.6 (H) 09/05/2024     BP Readings from Last 3 Encounters:   10/11/24 136/66   09/11/24 134/62   04/24/24 136/66      Lab Results   Component Value Date    LDLCALC 52 04/15/2024      Lab Results   Component Value Date    CREATININE 0.77 09/05/2024     Component      Latest Ref Rng 4/15/2024 9/5/2024   GLUCOSE      65 - 99 mg/dL 134 (H)  140 (H)    BUN      7 - 25 mg/dL 17  24    Creatinine      0.70 - 1.28 mg/dL 0.87  0.77    GFR, Calculated      > OR = 60 mL/min/1.73m2 92  95    SL AMB BUN/CREATININE RATIO      6 - 22 (calc) SEE NOTE:  SEE NOTE:    Sodium      135 - 146 mmol/L 139  141    Potassium      3.5 - 5.3 mmol/L 4.1  4.2    Chloride      98 - 110 mmol/L 102  108    Carbon Dioxide      20 - 32 mmol/L 26  25    Calcium      8.6 - 10.3 mg/dL 9.4  8.9    Cholesterol      <200 mg/dL 122     HDL      > OR = 40 mg/dL 44     Triglycerides      <150 mg/dL 189 (H)     LDL Calculated      mg/dL (calc) 52     Chol/HDL Ratio      <5.0 (calc) 2.8     Non-HDL Cholesterol      <130 mg/dL (calc) 78     Hemoglobin A1C      <5.7 % of total Hgb 7.3 (H)  6.6 (H)                   Lab Results   Component Value Date    HGBA1C 6.6 (H) 09/05/2024          Review of Systems   Constitutional:  Negative for activity change, diaphoresis, fatigue, fever  and unexpected weight change.   HENT: Negative.     Eyes:  Negative for visual disturbance.   Respiratory:  Negative for cough, chest tightness and shortness of breath.    Cardiovascular:  Negative for chest pain, palpitations and leg swelling.   Gastrointestinal:  Negative for abdominal pain, blood in stool, constipation, diarrhea, nausea and vomiting.   Endocrine: Negative for cold intolerance, heat intolerance, polydipsia, polyphagia and polyuria.   Genitourinary:  Negative for dysuria, enuresis, frequency and urgency.   Musculoskeletal:  Negative for arthralgias and myalgias.   Skin:  Negative for pallor, rash and wound.   Allergic/Immunologic: Negative.    Neurological:  Negative for dizziness, tremors, weakness and numbness.   Hematological: Negative.    Psychiatric/Behavioral: Negative.         Historical Information   Past Medical History:   Diagnosis Date    Cellulitis     last assessed 7/3/13    Diabetes (HCC)     last assessed 4/10/13    Gastroenteritis     last assessed 3/30/17    High cholesterol     Hypertension     Rhus dermatitis     last assessed 8/26/15    Skin lesion of face     last assessed 3/30/17     Past Surgical History:   Procedure Laterality Date    ROTATOR CUFF REPAIR       Social History   Social History     Substance and Sexual Activity   Alcohol Use Yes    Comment: rare- 1 if out to eat- rarely      Social History     Substance and Sexual Activity   Drug Use No     Social History     Tobacco Use   Smoking Status Former    Current packs/day: 0.00    Average packs/day: 0.5 packs/day for 9.9 years (5.0 ttl pk-yrs)    Types: Cigarettes, Cigars    Start date:     Quit date: 1989    Years since quittin.8   Smokeless Tobacco Never     Family History:   Family History   Problem Relation Age of Onset    No Known Problems Family     Diabetes unspecified Mother     No Known Problems Father        Meds/Allergies   Current Outpatient Medications   Medication Sig Dispense Refill  "   albuterol (ProAir HFA) 90 mcg/act inhaler Inhale 2 puffs every 6 (six) hours as needed for wheezing (cough) 8.5 g 0    amLODIPine-benazepril (LOTREL) 10-20 MG per capsule TAKE 1 CAPSULE BY MOUTH EVERY  capsule 1    atorvastatin (LIPITOR) 40 mg tablet TAKE 1 TABLET BY MOUTH EVERY DAY 90 tablet 1    BD Pen Needle Manju 2nd Gen 32G X 4 MM MISC Use daily 100 each 1    brimonidine (ALPHAGAN P) 0.1 % Administer to the right eye 3 (three) times a day      Empagliflozin (Jardiance) 10 MG TABS tablet Take 1 tablet (10 mg total) by mouth daily 90 tablet 1    glimepiride (AMARYL) 2 mg tablet TAKE 1 TABLET BY MOUTH EVERY DAY WITH BREAKFAST AND TAKE 2 TABLETS WITH DINNER 270 tablet 1    glucose blood (OneTouch Verio) test strip Test BG 2x daily as directed 200 each 3    Lancets (OneTouch Delica Plus Azhsem38W) MISC Test BG 2x daily as directed 200 each 3    liraglutide (Victoza) injection Inject 1.8 mg under the skin daily 27 mL 1    metFORMIN (GLUCOPHAGE) 1000 MG tablet Take 1 tablet (1,000 mg total) by mouth 2 (two) times a day 180 tablet 1    Naproxen Sodium (ALEVE PO) Take by mouth as needed      ramelteon (ROZEREM) 8 mg tablet Take 1 tablet (8 mg total) by mouth daily at bedtime 30 tablet 2     No current facility-administered medications for this visit.     Allergies   Allergen Reactions    Januvia [Sitagliptin] Other (See Comments)     Unknown reaction, patient can't recall       Objective   Vitals: Blood pressure 136/66, pulse 91, height 6' 1\" (1.854 m), weight 112 kg (248 lb), SpO2 98%.    Physical Exam  Vitals reviewed.   Constitutional:       General: He is not in acute distress.     Appearance: Normal appearance. He is not ill-appearing.   HENT:      Head: Normocephalic and atraumatic.      Nose: Nose normal.   Eyes:      Extraocular Movements: Extraocular movements intact.      Conjunctiva/sclera: Conjunctivae normal.   Pulmonary:      Effort: No respiratory distress.   Musculoskeletal:      Cervical back: " "Normal range of motion.   Neurological:      General: No focal deficit present.      Mental Status: He is alert and oriented to person, place, and time.   Psychiatric:         Mood and Affect: Mood normal.         Behavior: Behavior normal.         The history was obtained from the review of the chart, patient.    Lab Results:   Lab Results   Component Value Date/Time    Hemoglobin A1C 6.6 (H) 09/05/2024 07:28 AM    Hemoglobin A1C 7.3 (H) 04/15/2024 07:00 AM    White Blood Cell Count 5.9 09/05/2024 07:38 AM    White Blood Cell Count 7.8 04/15/2024 07:02 AM    Hemoglobin 16.6 09/05/2024 07:38 AM    Hemoglobin 18.5 (H) 04/15/2024 07:02 AM    HCT 48.2 09/05/2024 07:38 AM    HCT 54.2 (H) 04/15/2024 07:02 AM    MCV 93.1 09/05/2024 07:38 AM    MCV 93.4 04/15/2024 07:02 AM    Platelet Count 179 09/05/2024 07:38 AM    Platelet Count 178 04/15/2024 07:02 AM    BUN 24 09/05/2024 07:28 AM    BUN 17 04/15/2024 07:00 AM    Potassium 4.2 09/05/2024 07:28 AM    Potassium 4.1 04/15/2024 07:00 AM    Chloride 108 09/05/2024 07:28 AM    Chloride 102 04/15/2024 07:00 AM    CO2 25 09/05/2024 07:28 AM    CO2 26 04/15/2024 07:00 AM    Creatinine 0.77 09/05/2024 07:28 AM    Creatinine 0.87 04/15/2024 07:00 AM    HDL 44 04/15/2024 07:00 AM    Triglycerides 189 (H) 04/15/2024 07:00 AM           Imaging Studies: Results Review Statement: No pertinent imaging studies reviewed.    Portions of the record may have been created with voice recognition software. Occasional wrong word or \"sound a like\" substitutions may have occurred due to the inherent limitations of voice recognition software. Read the chart carefully and recognize, using context, where substitutions have occurred.    "

## 2024-11-13 DIAGNOSIS — E78.2 MIXED HYPERLIPIDEMIA: ICD-10-CM

## 2024-11-13 RX ORDER — ATORVASTATIN CALCIUM 40 MG/1
40 TABLET, FILM COATED ORAL DAILY
Qty: 90 TABLET | Refills: 1 | Status: SHIPPED | OUTPATIENT
Start: 2024-11-13

## 2024-12-05 ENCOUNTER — TELEPHONE (OUTPATIENT)
Dept: ENDOCRINOLOGY | Facility: CLINIC | Age: 74
End: 2024-12-05

## 2024-12-05 DIAGNOSIS — E11.65 TYPE 2 DIABETES MELLITUS WITH HYPERGLYCEMIA, WITHOUT LONG-TERM CURRENT USE OF INSULIN (HCC): ICD-10-CM

## 2024-12-05 RX ORDER — LIRAGLUTIDE 6 MG/ML
1.8 INJECTION SUBCUTANEOUS DAILY
Qty: 27 ML | Refills: 0 | Status: SHIPPED | OUTPATIENT
Start: 2024-12-05

## 2024-12-05 NOTE — TELEPHONE ENCOUNTER
Patient called the RX Refill Line. Message is being forwarded to the office.     Patient needs lab slips printed out to take with him to [employer] [facility] or if they can be emailed [email or web address]    Please contact patient at [phone] to let him know

## 2024-12-18 ENCOUNTER — TELEPHONE (OUTPATIENT)
Dept: ADMINISTRATIVE | Facility: OTHER | Age: 74
End: 2024-12-18

## 2024-12-18 NOTE — LETTER
Diabetic Eye Exam Form    Date Requested: 24  Patient: Tomas Cabrera  Patient : [id number]   Referring Provider: Chanelle Eason PA-C      DIABETIC Eye Exam Date _______________________________      Type of Exam MUST be documented for Diabetic Eye Exams. Please CHECK ONE.     Retinal Exam       Dilated Retinal Exam       OCT       Optomap-Iris Exam      Fundus Photography       Left Eye - Please check Retinopathy or No Retinopathy        Exam did show retinopathy    Exam did not show retinopathy       Right Eye - Please check Retinopathy or No Retinopathy       Exam did show retinopathy    Exam did not show retinopathy       Comments __________________________________________________________    Practice Providing Exam ______________________________________________    Exam Performed By (print name) _______________________________________      Provider Signature ___________________________________________________      These reports are needed for  compliance.  Please fax this completed form and a copy of the Diabetic Eye Exam report to our office located at [address] [place] [address] as soon as possible via Fax [phone] attention Alix: Phone [phone]  We thank you for your assistance in treating our mutual patient.

## 2024-12-18 NOTE — TELEPHONE ENCOUNTER
Upon review of the In Basket request we have found/obtained the documentation. After careful review of the document we are unable to complete this request for Diabetic Eye Exam because the documentation does not have the result(s) needed to close the requested care gap(s).    Any additional questions or concerns should be emailed to the Practice Liaisons via the appropriate education email address, please do not reply via In Basket.    Thank you  Alix Romero   [facility]

## 2024-12-18 NOTE — TELEPHONE ENCOUNTER
----- Message from Kd GARIBAY sent at 12/18/2024 11:43 AM EST -----  12/18/24 11:44 AM    Hello, our patient Tomas Cabrera has had Diabetic Eye Exam completed/performed. Please assist in updating the patient chart by pulling the document from the Media Tab. The date of service is 3/11/2024.     Thank you,  Kd Castillo  [facility] PRIMARY [facility]

## 2024-12-20 ENCOUNTER — RA CDI HCC (OUTPATIENT)
Dept: OTHER | Facility: HOSPITAL | Age: 74
End: 2024-12-20

## 2024-12-21 NOTE — PROGRESS NOTES
HCC coding opportunities          Chart Reviewed number of suggestions sent to Provider: 2  E11.319, E11.40     Patients Insurance     Medicare Insurance: [facility] [employer] Advantage

## 2024-12-23 NOTE — TELEPHONE ENCOUNTER
Upon review of the In Basket request and the patient's chart, initial outreach has been made via fax to facility. Please see Contacts section for details.     x1    Thank you  Alix Romero

## 2024-12-26 ENCOUNTER — TELEPHONE (OUTPATIENT)
Dept: ADMINISTRATIVE | Facility: OTHER | Age: 74
End: 2024-12-26

## 2024-12-26 ENCOUNTER — OFFICE VISIT (OUTPATIENT)
Dept: INTERNAL MEDICINE CLINIC | Age: 74
End: 2024-12-26
Payer: COMMERCIAL

## 2024-12-26 VITALS
TEMPERATURE: 98.6 F | BODY MASS INDEX: 33.27 KG/M2 | SYSTOLIC BLOOD PRESSURE: 138 MMHG | OXYGEN SATURATION: 97 % | DIASTOLIC BLOOD PRESSURE: 68 MMHG | HEART RATE: 91 BPM | WEIGHT: 251 LBS | HEIGHT: 73 IN

## 2024-12-26 DIAGNOSIS — I10 BENIGN ESSENTIAL HYPERTENSION: ICD-10-CM

## 2024-12-26 DIAGNOSIS — E11.65 TYPE 2 DIABETES MELLITUS WITH HYPERGLYCEMIA, WITHOUT LONG-TERM CURRENT USE OF INSULIN (HCC): Primary | ICD-10-CM

## 2024-12-26 DIAGNOSIS — Z00.00 MEDICARE ANNUAL WELLNESS VISIT, SUBSEQUENT: ICD-10-CM

## 2024-12-26 DIAGNOSIS — E78.2 MIXED HYPERLIPIDEMIA: ICD-10-CM

## 2024-12-26 PROCEDURE — G0439 PPPS, SUBSEQ VISIT: HCPCS | Performed by: PHYSICIAN ASSISTANT

## 2024-12-26 PROCEDURE — 99214 OFFICE O/P EST MOD 30 MIN: CPT | Performed by: PHYSICIAN ASSISTANT

## 2024-12-26 NOTE — PATIENT INSTRUCTIONS
Medicare Preventive Visit Patient Instructions  Thank you for completing your Welcome to Medicare Visit or Medicare Annual Wellness Visit today. Your next wellness visit will be due in one year (12/27/2025).  The screening/preventive services that you may require over the next 5-10 years are detailed below. Some tests may not apply to you based off risk factors and/or age. Screening tests ordered at today's visit but not completed yet may show as past due. Also, please note that scanned in results may not display below.  Preventive Screenings:  Service Recommendations Previous Testing/Comments   Colorectal Cancer Screening  Colonoscopy    Fecal Occult Blood Test (FOBT)/Fecal Immunochemical Test (FIT)  Fecal DNA/Cologuard Test  Flexible Sigmoidoscopy Age: 45-75 years old   Colonoscopy: every 10 years (May be performed more frequently if at higher risk)  OR  FOBT/FIT: every 1 year  OR  Cologuard: every 3 years  OR  Sigmoidoscopy: every 5 years  Screening may be recommended earlier than age 45 if at higher risk for colorectal cancer. Also, an individualized decision between you and your healthcare provider will decide whether screening between the ages of 76-85 would be appropriate. Colonoscopy: Not on file  FOBT/FIT: Not on file  Cologuard: 01/15/2023  Sigmoidoscopy: Not on file    Screening Current     Prostate Cancer Screening Individualized decision between patient and health care provider in men between ages of 55-69   Medicare will cover every 12 months beginning on the day after your 50th birthday PSA: 3.49 ng/mL     Screening Current     Hepatitis C Screening Once for adults born between 1945 and 1965  More frequently in patients at high risk for Hepatitis C Hep C Antibody: 07/29/2019    Screening Current   Diabetes Screening 1-2 times per year if you're at risk for diabetes or have pre-diabetes Fasting glucose: No results in last 5 years (No results in last 5 years)  A1C: 6.6 % of total Hgb  (9/5/2024)  Screening Not Indicated  History Diabetes   Cholesterol Screening Once every 5 years if you don't have a lipid disorder. May order more often based on risk factors. Lipid panel: 04/15/2024  Screening Not Indicated  History Lipid Disorder      Other Preventive Screenings Covered by Medicare:  Abdominal Aortic Aneurysm (AAA) Screening: covered once if your at risk. You're considered to be at risk if you have a family history of AAA or a male between the age of 65-75 who smoking at least 100 cigarettes in your lifetime.  Lung Cancer Screening: covers low dose CT scan once per year if you meet all of the following conditions: (1) Age 55-77; (2) No signs or symptoms of lung cancer; (3) Current smoker or have quit smoking within the last 15 years; (4) You have a tobacco smoking history of at least 20 pack years (packs per day x number of years you smoked); (5) You get a written order from a healthcare provider.  Glaucoma Screening: covered annually if you're considered high risk: (1) You have diabetes OR (2) Family history of glaucoma OR (3)  aged 50 and older OR (4)  American aged 65 and older  Osteoporosis Screening: covered every 2 years if you meet one of the following conditions: (1) Have a vertebral abnormality; (2) On glucocorticoid therapy for more than 3 months; (3) Have primary hyperparathyroidism; (4) On osteoporosis medications and need to assess response to drug therapy.  HIV Screening: covered annually if you're between the age of 15-65. Also covered annually if you are younger than 15 and older than 65 with risk factors for HIV infection. For pregnant patients, it is covered up to 3 times per pregnancy.    Immunizations:  Immunization Recommendations   Influenza Vaccine Annual influenza vaccination during flu season is recommended for all persons aged >= 6 months who do not have contraindications   Pneumococcal Vaccine   * Pneumococcal conjugate vaccine = PCV13 (Prevnar  13), PCV15 (Vaxneuvance), PCV20 (Prevnar 20)  * Pneumococcal polysaccharide vaccine = PPSV23 (Pneumovax) Adults 19-65 yo with certain risk factors or if 65+ yo  If never received any pneumonia vaccine: recommend Prevnar 20 (PCV20)  Give PCV20 if previously received 1 dose of PCV13 or PPSV23   Hepatitis B Vaccine 3 dose series if at intermediate or high risk (ex: diabetes, end stage renal disease, liver disease)   Respiratory syncytial virus (RSV) Vaccine - COVERED BY MEDICARE PART D  * RSVPreF3 (Arexvy) CDC recommends that adults 60 years of age and older may receive a single dose of RSV vaccine using shared clinical decision-making (SCDM)   Tetanus (Td) Vaccine - COST NOT COVERED BY MEDICARE PART B Following completion of primary series, a booster dose should be given every 10 years to maintain immunity against tetanus. Td may also be given as tetanus wound prophylaxis.   Tdap Vaccine - COST NOT COVERED BY MEDICARE PART B Recommended at least once for all adults. For pregnant patients, recommended with each pregnancy.   Shingles Vaccine (Shingrix) - COST NOT COVERED BY MEDICARE PART B  2 shot series recommended in those 19 years and older who have or will have weakened immune systems or those 50 years and older     Health Maintenance Due:      Topic Date Due   • Colorectal Cancer Screening  01/15/2026   • Hepatitis C Screening  Completed     Immunizations Due:      Topic Date Due   • Pneumococcal Vaccine: 65+ Years (1 of 2 - PCV) Never done   • Influenza Vaccine (1) Never done   • COVID-19 Vaccine (3 - 2024-25 season) 09/01/2024     Advance Directives   What are advance directives?  Advance directives are legal documents that state your wishes and plans for medical care. These plans are made ahead of time in case you lose your ability to make decisions for yourself. Advance directives can apply to any medical decision, such as the treatments you want, and if you want to donate organs.   What are the types of  advance directives?  There are many types of advance directives, and each state has rules about how to use them. You may choose a combination of any of the following:  Living will:  This is a written record of the treatment you want. You can also choose which treatments you do not want, which to limit, and which to stop at a certain time. This includes surgery, medicine, IV fluid, and tube feedings.   Durable power of  for healthcare (DPAHC):  This is a written record that states who you want to make healthcare choices for you when you are unable to make them for yourself. This person, called a proxy, is usually a family member or a friend. You may choose more than 1 proxy.  Do not resuscitate (DNR) order:  A DNR order is used in case your heart stops beating or you stop breathing. It is a request not to have certain forms of treatment, such as CPR. A DNR order may be included in other types of advance directives.  Medical directive:  This covers the care that you want if you are in a coma, near death, or unable to make decisions for yourself. You can list the treatments you want for each condition. Treatment may include pain medicine, surgery, blood transfusions, dialysis, IV or tube feedings, and a ventilator (breathing machine).  Values history:  This document has questions about your views, beliefs, and how you feel and think about life. This information can help others choose the care that you would choose.  Why are advance directives important?  An advance directive helps you control your care. Although spoken wishes may be used, it is better to have your wishes written down. Spoken wishes can be misunderstood, or not followed. Treatments may be given even if you do not want them. An advance directive may make it easier for your family to make difficult choices about your care.   Weight Management   Why it is important to manage your weight:  Being overweight increases your risk of health conditions  such as heart disease, high blood pressure, type 2 diabetes, and certain types of cancer. It can also increase your risk for osteoarthritis, sleep apnea, and other respiratory problems. Aim for a slow, steady weight loss. Even a small amount of weight loss can lower your risk of health problems.  How to lose weight safely:  A safe and healthy way to lose weight is to eat fewer calories and get regular exercise. You can lose up about 1 pound a week by decreasing the number of calories you eat by 500 calories each day.   Healthy meal plan for weight management:  A healthy meal plan includes a variety of foods, contains fewer calories, and helps you stay healthy. A healthy meal plan includes the following:  Eat whole-grain foods more often.  A healthy meal plan should contain fiber. Fiber is the part of grains, fruits, and vegetables that is not broken down by your body. Whole-grain foods are healthy and provide extra fiber in your diet. Some examples of whole-grain foods are whole-wheat breads and pastas, oatmeal, brown rice, and bulgur.  Eat a variety of vegetables every day.  Include dark, leafy greens such as spinach, kale, connie greens, and mustard greens. Eat yellow and orange vegetables such as carrots, sweet potatoes, and winter squash.   Eat a variety of fruits every day.  Choose fresh or canned fruit (canned in its own juice or light syrup) instead of juice. Fruit juice has very little or no fiber.  Eat low-fat dairy foods.  Drink fat-free (skim) milk or 1% milk. Eat fat-free yogurt and low-fat cottage cheese. Try low-fat cheeses such as mozzarella and other reduced-fat cheeses.  Choose meat and other protein foods that are low in fat.  Choose beans or other legumes such as split peas or lentils. Choose fish, skinless poultry (chicken or turkey), or lean cuts of red meat (beef or pork). Before you cook meat or poultry, cut off any visible fat.   Use less fat and oil.  Try baking foods instead of frying  them. Add less fat, such as margarine, sour cream, regular salad dressing and mayonnaise to foods. Eat fewer high-fat foods. Some examples of high-fat foods include french fries, doughnuts, ice cream, and cakes.  Eat fewer sweets.  Limit foods and drinks that are high in sugar. This includes candy, cookies, regular soda, and sweetened drinks.  Exercise:  Exercise at least 30 minutes per day on most days of the week. Some examples of exercise include walking, biking, dancing, and swimming. You can also fit in more physical activity by taking the stairs instead of the elevator or parking farther away from stores. Ask your healthcare provider about the best exercise plan for you.      © Copyright [employer] 2018 Information is for End User's use only and may not be sold, redistributed or otherwise used for commercial purposes. All illustrations and images included in CareNotes® are the copyrighted property of [employer] or [employer]

## 2024-12-26 NOTE — PROGRESS NOTES
Name: Tomas Cabrera      : 1950      MRN: [id number]  Encounter Provider: Chanelle Eason PA-C  Encounter Date: 2024   Encounter department: [facility]    Assessment & Plan  Type 2 diabetes mellitus with hyperglycemia, without long-term current use of insulin (HCC)  Follows with endocrinology - well controlled  Continue victoza   Lab Results   Component Value Date    HGBA1C 6.6 (H) 2024       Orders:  •  Ambulatory Referral to Ophthalmology; Future  •  CBC and differential; Future    Medicare annual wellness visit, subsequent         Mixed hyperlipidemia  Continue atorvastatin daily   Orders:  •  CBC and differential; Future    Benign essential hypertension  Continue amlodipine/benazepril   Orders:  •  CBC and differential; Future      Depression Screening and Follow-up Plan: Patient was screened for depression during today's encounter. They screened negative with a PHQ-2 score of 0.    Preventive health issues were discussed with patient, and age appropriate screening tests were ordered as noted in patient's After Visit Summary. Personalized health advice and appropriate referrals for health education or preventive services given if needed, as noted in patient's After Visit Summary.    History of Present Illness     73 y/o male with hx of DM, HTN, CAD, HLD presents for f/u and AWV  Pt has been monitoring his BS at home - usually 1-2 x / week  FBS typically 100-140s   : Hga1c 6.6   Follows with endcrinology     Pt states he has not rozerem often bc he doesn't feel it helps much   He has trouble falling asleep at night bc he is thinking about things        Patient Care Team:  Chanelle Eason PA-C as PCP - General (Physician Assistant)  Keila Cabral MD as PCP - PCP-[facility] ([facility])  Clay Edmonds MD as PCP - PCP-[facility] ([facility])  Clemente Muller OD (Optometry)  Seda Kaufman MD (Endocrinology)  Jackie Flores PA-C as Physician  Assistant (Endocrinology)    Review of Systems   Constitutional:  Negative for activity change, appetite change, chills, diaphoresis, fatigue and fever.   HENT:  Negative for congestion, postnasal drip and sore throat.    Eyes:  Negative for pain and redness.   Respiratory:  Negative for cough, shortness of breath and wheezing.    Cardiovascular:  Negative for chest pain and leg swelling.   Gastrointestinal:  Negative for abdominal pain, constipation, diarrhea and nausea.   Endocrine: Negative for cold intolerance.   Genitourinary:  Negative for dysuria and frequency.   Musculoskeletal:  Negative for arthralgias, back pain and gait problem.   Skin:  Negative for rash and wound.   Allergic/Immunologic: Negative for environmental allergies.   Neurological:  Negative for dizziness and headaches.   Hematological:  Does not bruise/bleed easily.   Psychiatric/Behavioral:  Negative for sleep disturbance. The patient is not nervous/anxious.      Medical History Reviewed by provider this encounter:  Tobacco  Allergies  Meds  Problems  Med Hx  Surg Hx  Fam Hx       Annual Wellness Visit Questionnaire   Tomas is here for his Subsequent Wellness visit.     Health Risk Assessment:   Patient rates overall health as good. Patient feels that their physical health rating is same. Patient is satisfied with their life. Eyesight was rated as slightly worse. Hearing was rated as same. Patient feels that their emotional and mental health rating is same. Patients states they are never, rarely angry. Patient states they are never, rarely unusually tired/fatigued. Pain experienced in the last 7 days has been none. Patient states that he has experienced no weight loss or gain in last 6 months.     Depression Screening:   PHQ-2 Score: 0      Fall Risk Screening:   In the past year, patient has experienced: no history of falling in past year      Home Safety:  Patient does not have trouble with stairs inside or outside of their home.  Patient has working smoke alarms and has working carbon monoxide detector. Home safety hazards include: none.     Nutrition:   Current diet is Regular and Diabetic.     Medications:   Patient is currently taking over-the-counter supplements. OTC medications include: see medication list. Patient is able to manage medications.     Activities of Daily Living (ADLs)/Instrumental Activities of Daily Living (IADLs):   Walk and transfer into and out of bed and chair?: Yes  Dress and groom yourself?: Yes    Bathe or shower yourself?: Yes    Feed yourself? Yes  Do your laundry/housekeeping?: Yes  Manage your money, pay your bills and track your expenses?: Yes  Make your own meals?: Yes    Do your own shopping?: Yes    Previous Hospitalizations:   Any hospitalizations or ED visits within the last 12 months?: No      Advance Care Planning:   Living will: No    Durable POA for healthcare: No    Advanced directive: No    Advanced directive counseling given: Yes    Patient declined ACP directive: Yes      Cognitive Screening:   Provider or family/friend/caregiver concerned regarding cognition?: No    PREVENTIVE SCREENINGS      Cardiovascular Screening:    General: Screening Not Indicated and History Lipid Disorder      Diabetes Screening:     General: Screening Not Indicated and History Diabetes      Colorectal Cancer Screening:     General: Screening Current      Prostate Cancer Screening:    General: Screening Current      Osteoporosis Screening:    General: Screening Not Indicated      Abdominal Aortic Aneurysm (AAA) Screening:    Risk factors include: age between 65-76 yo and tobacco use        Lung Cancer Screening:     General: Screening Not Indicated      Hepatitis C Screening:    General: Screening Current    Screening, Brief Intervention, and Referral to Treatment (SBIRT)    Screening      Single Item Drug Screening:  How often have you used an illegal drug (including marijuana) or a prescription medication for  "non-medical reasons in the past year? never    Single Item Drug Screen Score: 0  Interpretation: Negative screen for possible drug use disorder    Brief Intervention  Alcohol & drug use screenings were reviewed. No concerns regarding substance use disorder identified.     Other Counseling Topics:   Calcium and vitamin D intake.     Social Drivers of Health     Financial Resource Strain: Low Risk  (12/21/2023)    Overall Financial Resource Strain (CARDIA)    • Difficulty of Paying Living Expenses: Not very hard   Food Insecurity: No Food Insecurity (12/26/2024)    Hunger Vital Sign    • Worried About Running Out of Food in the Last Year: Never true    • Ran Out of Food in the Last Year: Never true   Transportation Needs: No Transportation Needs (12/26/2024)    PRAPARE - Transportation    • Lack of Transportation (Medical): No    • Lack of Transportation (Non-Medical): No   Housing Stability: Unknown (12/26/2024)    Housing Stability Vital Sign    • Unable to Pay for Housing in the Last Year: No    • Homeless in the Last Year: No   Utilities: Not At Risk (12/26/2024)    [facility] Utilities    • Threatened with loss of utilities: No     No results found.    Objective   /68 (BP Location: Left arm, Patient Position: Sitting, Cuff Size: Large)   Pulse 91   Temp 98.6 °F (37 °C) (Temporal)   Ht 6' 1\" (1.854 m)   Wt 114 kg (251 lb)   SpO2 97%   BMI 33.12 kg/m²     Physical Exam  Vitals reviewed.   Constitutional:       General: He is not in acute distress.  HENT:      Head: Normocephalic and atraumatic.      Right Ear: Tympanic membrane, ear canal and external ear normal. There is no impacted cerumen.      Left Ear: Tympanic membrane, ear canal and external ear normal. There is no impacted cerumen.      Nose: Nose normal.      Mouth/Throat:      Mouth: Mucous membranes are moist.      Pharynx: No oropharyngeal exudate or posterior oropharyngeal erythema.   Eyes:      General:         Right eye: No discharge.         " Left eye: No discharge.      Extraocular Movements: Extraocular movements intact.      Conjunctiva/sclera: Conjunctivae normal.      Pupils: Pupils are equal, round, and reactive to light.   Cardiovascular:      Rate and Rhythm: Normal rate and regular rhythm.   Pulmonary:      Effort: Pulmonary effort is normal. No respiratory distress.      Breath sounds: Normal breath sounds. No wheezing or rales.   Abdominal:      General: Bowel sounds are normal. There is no distension.   Musculoskeletal:      Cervical back: Normal range of motion. No rigidity.      Right lower leg: No edema.      Left lower leg: No edema.   Lymphadenopathy:      Cervical: No cervical adenopathy.   Skin:     General: Skin is warm.      Findings: No erythema or rash.   Neurological:      General: No focal deficit present.      Mental Status: He is alert.   Psychiatric:         Mood and Affect: Mood normal.         Behavior: Behavior normal.     Administrative Statements   I have spent a total time of 34 minutes in caring for this patient on the day of the visit/encounter including Instructions for management, Documenting in the medical record, Reviewing / ordering tests, medicine, procedures  , and Obtaining or reviewing history  . Topics discussed with the patient / family include symptom assessment and management and medication review.

## 2024-12-26 NOTE — ASSESSMENT & PLAN NOTE
Follows with endocrinology - well controlled  Continue victoza   Lab Results   Component Value Date    HGBA1C 6.6 (H) 09/05/2024       Orders:    Ambulatory Referral to Ophthalmology; Future    CBC and differential; Future

## 2024-12-27 NOTE — TELEPHONE ENCOUNTER
Upon review of the In Basket request we were able to locate, review, and update the patient chart as requested for Diabetic Eye Exam.    Any additional questions or concerns should be emailed to the Practice Liaisons via the appropriate education email address, please do not reply via In Basket.    Thank you  HARIKA ANDERSON MA   [facility]

## 2025-02-19 ENCOUNTER — TELEPHONE (OUTPATIENT)
Age: 75
End: 2025-02-19

## 2025-03-22 DIAGNOSIS — E11.65 TYPE 2 DIABETES MELLITUS WITH HYPERGLYCEMIA, WITHOUT LONG-TERM CURRENT USE OF INSULIN (HCC): ICD-10-CM

## 2025-03-24 RX ORDER — LIRAGLUTIDE 6 MG/ML
1.8 INJECTION SUBCUTANEOUS DAILY
Qty: 27 ML | Refills: 0 | Status: SHIPPED | OUTPATIENT
Start: 2025-03-24

## 2025-03-26 ENCOUNTER — TELEPHONE (OUTPATIENT)
Age: 75
End: 2025-03-26

## 2025-03-26 NOTE — TELEPHONE ENCOUNTER
Patient called  stated that  victoza is  on back  order  and they will  be sending  him the generic  brand he want to know  will it be okay to start  the generic brand.

## 2025-05-09 ENCOUNTER — VBI (OUTPATIENT)
Dept: ADMINISTRATIVE | Facility: OTHER | Age: 75
End: 2025-05-09

## 2025-05-09 NOTE — TELEPHONE ENCOUNTER
05/09/25 9:42 AM     Chart reviewed for Diabetic Eye Exam was/were not submitted to the patient's insurance.     Gladis Larsen MA   [facility]

## 2025-05-10 DIAGNOSIS — I10 ESSENTIAL HYPERTENSION: ICD-10-CM

## 2025-05-11 RX ORDER — AMLODIPINE AND BENAZEPRIL HYDROCHLORIDE 10; 20 MG/1; MG/1
1 CAPSULE ORAL DAILY
Qty: 100 CAPSULE | Refills: 1 | Status: SHIPPED | OUTPATIENT
Start: 2025-05-11

## 2025-05-12 DIAGNOSIS — E78.2 MIXED HYPERLIPIDEMIA: ICD-10-CM

## 2025-05-12 DIAGNOSIS — E11.8 TYPE 2 DIABETES MELLITUS WITH COMPLICATION (HCC): ICD-10-CM

## 2025-05-12 DIAGNOSIS — E11.65 TYPE 2 DIABETES MELLITUS WITH HYPERGLYCEMIA, WITHOUT LONG-TERM CURRENT USE OF INSULIN (HCC): ICD-10-CM

## 2025-05-12 RX ORDER — ATORVASTATIN CALCIUM 40 MG/1
40 TABLET, FILM COATED ORAL DAILY
Qty: 90 TABLET | Refills: 0 | Status: SHIPPED | OUTPATIENT
Start: 2025-05-12

## 2025-05-12 RX ORDER — GLIMEPIRIDE 2 MG/1
TABLET ORAL
Qty: 270 TABLET | Refills: 0 | Status: SHIPPED | OUTPATIENT
Start: 2025-05-12

## 2025-05-12 RX ORDER — EMPAGLIFLOZIN 10 MG/1
10 TABLET, FILM COATED ORAL DAILY
Qty: 90 TABLET | Refills: 0 | Status: SHIPPED | OUTPATIENT
Start: 2025-05-12

## 2025-05-13 ENCOUNTER — TELEPHONE (OUTPATIENT)
Age: 75
End: 2025-05-13

## 2025-05-13 DIAGNOSIS — E11.65 TYPE 2 DIABETES MELLITUS WITH HYPERGLYCEMIA, WITHOUT LONG-TERM CURRENT USE OF INSULIN (HCC): ICD-10-CM

## 2025-05-13 NOTE — TELEPHONE ENCOUNTER
Pt called he stated when his last rx was called in for the victoza. They sent him Liraglutide. He is asking if the pen needles will fit that pen or does he need a new pen needle called in.  He is now using the bd saeid 2nd gen 32g x 4mm. Pt uses 1 daily, box of 100. He will need them called into his pharmacy [facility] [address] IF they are going to fit the new pen

## 2025-05-14 RX ORDER — PEN NEEDLE, DIABETIC 32GX 5/32"
NEEDLE, DISPOSABLE MISCELLANEOUS
Qty: 100 EACH | Refills: 1 | Status: SHIPPED | OUTPATIENT
Start: 2025-05-14

## 2025-06-02 ENCOUNTER — RESULTS FOLLOW-UP (OUTPATIENT)
Dept: OTHER | Facility: HOSPITAL | Age: 75
End: 2025-06-02

## 2025-06-02 LAB
ALBUMIN/CREAT UR: 10 MG/G CREAT
BASOPHILS # BLD AUTO: 59 CELLS/UL (ref 0–200)
BASOPHILS NFR BLD AUTO: 1 %
BUN SERPL-MCNC: 18 MG/DL (ref 7–25)
BUN/CREAT SERPL: ABNORMAL (CALC) (ref 6–22)
CALCIUM SERPL-MCNC: 9.4 MG/DL (ref 8.6–10.3)
CHLORIDE SERPL-SCNC: 103 MMOL/L (ref 98–110)
CHOLEST SERPL-MCNC: 123 MG/DL
CHOLEST/HDLC SERPL: 2.9 (CALC)
CO2 SERPL-SCNC: 26 MMOL/L (ref 20–32)
CREAT SERPL-MCNC: 0.87 MG/DL (ref 0.7–1.28)
CREAT UR-MCNC: 61 MG/DL (ref 20–320)
EOSINOPHIL # BLD AUTO: 525 CELLS/UL (ref 15–500)
EOSINOPHIL NFR BLD AUTO: 8.9 %
ERYTHROCYTE [DISTWIDTH] IN BLOOD BY AUTOMATED COUNT: 12.9 % (ref 11–15)
GFR/BSA.PRED SERPLBLD CYS-BASED-ARV: 91 ML/MIN/1.73M2
GLUCOSE SERPL-MCNC: 174 MG/DL (ref 65–99)
HBA1C MFR BLD: 8.2 %
HCT VFR BLD AUTO: 54.3 % (ref 38.5–50)
HDLC SERPL-MCNC: 43 MG/DL
HGB BLD-MCNC: 18.5 G/DL (ref 13.2–17.1)
LDLC SERPL CALC-MCNC: 59 MG/DL (CALC)
LYMPHOCYTES # BLD AUTO: 1280 CELLS/UL (ref 850–3900)
LYMPHOCYTES NFR BLD AUTO: 21.7 %
MCH RBC QN AUTO: 32.3 PG (ref 27–33)
MCHC RBC AUTO-ENTMCNC: 34.1 G/DL (ref 32–36)
MCV RBC AUTO: 94.8 FL (ref 80–100)
MICROALBUMIN UR-MCNC: 0.6 MG/DL
MONOCYTES # BLD AUTO: 519 CELLS/UL (ref 200–950)
MONOCYTES NFR BLD AUTO: 8.8 %
NEUTROPHILS # BLD AUTO: 3516 CELLS/UL (ref 1500–7800)
NEUTROPHILS NFR BLD AUTO: 59.6 %
NONHDLC SERPL-MCNC: 80 MG/DL (CALC)
PLATELET # BLD AUTO: 162 THOUSAND/UL (ref 140–400)
PMV BLD REES-ECKER: 9.5 FL (ref 7.5–12.5)
POTASSIUM SERPL-SCNC: 4 MMOL/L (ref 3.5–5.3)
RBC # BLD AUTO: 5.73 MILLION/UL (ref 4.2–5.8)
SODIUM SERPL-SCNC: 140 MMOL/L (ref 135–146)
TRIGL SERPL-MCNC: 131 MG/DL
VIT B12 SERPL-MCNC: 410 PG/ML (ref 200–1100)
WBC # BLD AUTO: 5.9 THOUSAND/UL (ref 3.8–10.8)

## 2025-06-12 ENCOUNTER — RA CDI HCC (OUTPATIENT)
Dept: OTHER | Facility: HOSPITAL | Age: 75
End: 2025-06-12

## 2025-06-13 NOTE — PROGRESS NOTES
HCC coding opportunities    E11.65, E11.3291, E11.40  GR     Chart Reviewed number of suggestions sent to Provider: 3     Patients Insurance     Medicare Insurance: [employer] Advantage

## 2025-06-18 ENCOUNTER — TELEPHONE (OUTPATIENT)
Age: 75
End: 2025-06-18

## 2025-06-18 ENCOUNTER — OFFICE VISIT (OUTPATIENT)
Dept: ENDOCRINOLOGY | Facility: CLINIC | Age: 75
End: 2025-06-18
Payer: COMMERCIAL

## 2025-06-18 ENCOUNTER — OFFICE VISIT (OUTPATIENT)
Dept: INTERNAL MEDICINE CLINIC | Age: 75
End: 2025-06-18
Payer: COMMERCIAL

## 2025-06-18 VITALS
SYSTOLIC BLOOD PRESSURE: 128 MMHG | HEIGHT: 73 IN | DIASTOLIC BLOOD PRESSURE: 76 MMHG | HEART RATE: 93 BPM | WEIGHT: 260 LBS | OXYGEN SATURATION: 97 % | BODY MASS INDEX: 34.46 KG/M2

## 2025-06-18 VITALS
HEIGHT: 73 IN | WEIGHT: 260 LBS | HEART RATE: 93 BPM | DIASTOLIC BLOOD PRESSURE: 70 MMHG | SYSTOLIC BLOOD PRESSURE: 126 MMHG | BODY MASS INDEX: 34.46 KG/M2 | OXYGEN SATURATION: 95 % | TEMPERATURE: 98 F

## 2025-06-18 DIAGNOSIS — E11.65 TYPE 2 DIABETES MELLITUS WITH HYPERGLYCEMIA, WITHOUT LONG-TERM CURRENT USE OF INSULIN (HCC): ICD-10-CM

## 2025-06-18 DIAGNOSIS — E11.65 TYPE 2 DIABETES MELLITUS WITH HYPERGLYCEMIA, WITHOUT LONG-TERM CURRENT USE OF INSULIN (HCC): Primary | ICD-10-CM

## 2025-06-18 DIAGNOSIS — I10 BENIGN ESSENTIAL HYPERTENSION: Primary | ICD-10-CM

## 2025-06-18 DIAGNOSIS — E11.49 OTHER DIABETIC NEUROLOGICAL COMPLICATION ASSOCIATED WITH TYPE 2 DIABETES MELLITUS (HCC): ICD-10-CM

## 2025-06-18 DIAGNOSIS — E78.2 MIXED HYPERLIPIDEMIA: ICD-10-CM

## 2025-06-18 DIAGNOSIS — I10 BENIGN ESSENTIAL HYPERTENSION: ICD-10-CM

## 2025-06-18 PROCEDURE — 99214 OFFICE O/P EST MOD 30 MIN: CPT | Performed by: PHYSICIAN ASSISTANT

## 2025-06-18 PROCEDURE — G2211 COMPLEX E/M VISIT ADD ON: HCPCS | Performed by: PHYSICIAN ASSISTANT

## 2025-06-18 RX ORDER — LIRAGLUTIDE 6 MG/ML
1.8 INJECTION SUBCUTANEOUS DAILY
COMMUNITY

## 2025-06-18 NOTE — PATIENT INSTRUCTIONS
Hypoglycemia instructions   Tomas Cabrera  6/18/2025  [id number]    Low Blood Sugar    Steps to treat low blood sugar.    1. Test blood sugar if you have symptoms of low blood sugar:   Low Blood Sugar Symptoms:  o Sweaty  o Dizzy  o Rapid heartbeat  o Shaky  o Bad mood  o Hungry      2. Treat blood sugar less than 70 with 15 grams of fast-acting carbohydrate:   Examples of 15 grams Fast-Acting Carbohydrate:  o 4 oz juice  o 4 oz regular soda  o 3-4 glucose tablets (chew)  o 3-4 hard candies (chew)          3.  Wait 15 minutes and test your blood sugar again     4. If blood sugar is less than 100, repeat steps 2-3.    5. When your blood sugar is 100 or more, eat a snack if it will be longer than one hour until your next meal. The snack should be 15 grams of carbohydrate and a protein:   Examples of snacks:  o ½ sandwich  o 6 crackers with cheese  o Piece of fruit with cheese or peanut butter  o 6 crackers with peanut butter

## 2025-06-18 NOTE — PROGRESS NOTES
Name: Tomas Cabrera      : 1950      MRN: [id number]  Encounter Provider: Chanelle Eason PA-C  Encounter Date: 2025   Encounter department: [facility]  :  Assessment & Plan  Benign essential hypertension  Well controlled  Continue lotrel          Type 2 diabetes mellitus with hyperglycemia, without long-term current use of insulin (HCC)  Scheduled for eye exam   F/u with endocrinology today - hga1c increased   Discussed dietary changes   Lab Results   Component Value Date    HGBA1C 8.2 (H) 2025            Mixed hyperlipidemia  Continue atorvastatin               History of Present Illness   75 y/o male with hx of CAD, HTN, DM, HLD, insomnia / RLS at bedtime presents for f/u     Pt states he has been eating a lot of junk lately and not watching sweets, eating cookies and ice cream  States he has been compliant with medications  Hga1c increased to 8.2  Pt has f/u with endocrinology today   Urine negative microalbumin    BP well controlled            Review of Systems   Constitutional:  Negative for activity change, appetite change, chills, diaphoresis and fever.   HENT:  Negative for congestion and sore throat.    Eyes:  Negative for pain and redness.   Respiratory:  Negative for cough, shortness of breath and wheezing.    Cardiovascular:  Negative for chest pain and leg swelling.   Gastrointestinal:  Negative for abdominal pain, constipation, diarrhea and nausea.   Genitourinary:  Negative for dysuria and frequency.   Musculoskeletal:  Positive for arthralgias and back pain (stiffness). Negative for joint swelling.   Skin:  Negative for rash.   Neurological:  Positive for numbness (b/l feet - tingling sensation). Negative for dizziness, light-headedness and headaches.   Psychiatric/Behavioral:  Negative for sleep disturbance. The patient is not nervous/anxious.        Objective   /70 (BP Location: Left arm, Patient Position: Sitting, Cuff Size: Adult)    "Pulse 93   Temp 98 °F (36.7 °C) (Temporal)   Ht 6' 1\" (1.854 m)   Wt 118 kg (260 lb)   SpO2 95%   BMI 34.30 kg/m²      Physical Exam  Vitals reviewed.   Constitutional:       General: He is not in acute distress.  HENT:      Head: Normocephalic and atraumatic.      Right Ear: Tympanic membrane, ear canal and external ear normal.      Left Ear: Tympanic membrane, ear canal and external ear normal.      Nose: Nose normal.      Mouth/Throat:      Mouth: Mucous membranes are moist.      Pharynx: No oropharyngeal exudate or posterior oropharyngeal erythema.     Eyes:      General:         Right eye: No discharge.         Left eye: No discharge.      Extraocular Movements: Extraocular movements intact.      Conjunctiva/sclera: Conjunctivae normal.      Pupils: Pupils are equal, round, and reactive to light.       Cardiovascular:      Rate and Rhythm: Normal rate and regular rhythm.      Pulses: no weak pulses.           Dorsalis pedis pulses are 2+ on the right side and 2+ on the left side.        Posterior tibial pulses are 2+ on the right side and 2+ on the left side.      Comments: Apical HR 84  Pulmonary:      Effort: Pulmonary effort is normal. No respiratory distress.      Breath sounds: Normal breath sounds. No wheezing or rales.   Abdominal:      General: There is no distension.      Tenderness: There is no abdominal tenderness.     Musculoskeletal:      Cervical back: Normal range of motion. No rigidity.      Right lower leg: No edema.      Left lower leg: No edema.   Feet:      Right foot:      Skin integrity: No ulcer, skin breakdown, erythema, warmth, callus or dry skin.      Left foot:      Skin integrity: No ulcer, skin breakdown, erythema, warmth, callus or dry skin.   Lymphadenopathy:      Cervical: No cervical adenopathy.     Skin:     General: Skin is warm.      Findings: No erythema or rash.     Neurological:      General: No focal deficit present.      Mental Status: He is alert.      Cranial " Nerves: No cranial nerve deficit.     Psychiatric:         Mood and Affect: Mood normal.       Diabetic Foot Exam    Patient's shoes and socks removed.    Right Foot/Ankle   Right Foot Inspection  Skin Exam: skin normal and skin intact. No dry skin, no warmth, no callus, no erythema, no maceration, no abnormal color, no pre-ulcer, no ulcer and no callus.     Toe Exam: ROM and strength within normal limits. No swelling, no tenderness and erythema    Sensory   Proprioception: intact  Monofilament testing: intact    Vascular  The right DP pulse is 2+. The right PT pulse is 2+.     Left Foot/Ankle  Left Foot Inspection  Skin Exam: skin normal and skin intact. No dry skin, no warmth, no erythema, no maceration, normal color, no pre-ulcer, no ulcer and no callus.     Toe Exam: ROM and strength within normal limits. No swelling, no tenderness and no erythema.     Sensory   Proprioception: intact  Monofilament testing: intact    Vascular  The left DP pulse is 2+. The left PT pulse is 2+.     Assign Risk Category  No deformity present  No loss of protective sensation  No weak pulses  Risk: 0

## 2025-06-18 NOTE — ASSESSMENT & PLAN NOTE
Scheduled for eye exam   F/u with endocrinology today - hga1c increased   Discussed dietary changes   Lab Results   Component Value Date    HGBA1C 8.2 (H) 06/02/2025

## 2025-06-18 NOTE — ASSESSMENT & PLAN NOTE
HGA1C 8.2%. Worsened.  Treatment regimen: Previously BG levels were well-controlled on current treatment regimen with better compliance to diet.  He admits in the past 6 months he has been noncompliant with diet.  He plans to make dietary changes.  Advised for now he continue current treatment and send a blood glucose log in 2 weeks with a checking twice a day.  If blood glucose levels are improving with dietary changes then we may be able to continue the current treatment otherwise adjustments will need to be made.  Episodes of hypoglycemia can lead to permanent disability and death.  Discussed risks/complications associated with uncontrolled diabetes.    Advised to adhere to diabetic diet, and recommended staying active/exercising routinely as tolerated.  Keep carbohydrates consistent to limit blood glucose fluctuations.  Advised to call if blood sugars less than 70 mg/dl or over 300 mg/dl.   Check blood glucose 2 times a day  Discussed symptoms and treatment of hypoglycemia.   Recommended routine follow-up with podiatry and ophthalmology.   Send log in 2 weeks.    Ordered blood work to complete prior to next visit.    Lab Results   Component Value Date    HGBA1C 8.2 (H) 06/02/2025       Orders:    Hemoglobin A1C; Future    Albumin / creatinine urine ratio; Future    Basic metabolic panel; Future

## 2025-06-18 NOTE — PROGRESS NOTES
Name: Tomas Cabrera      : 1950      MRN: [id number]  Encounter Provider: Jackie Flores PA-C  Encounter Date: 2025   Encounter department: [facility] [place]    CC: DM  Assessment & Plan  Type 2 diabetes mellitus with hyperglycemia, without long-term current use of insulin (HCC)  HGA1C 8.2%. Worsened.  Treatment regimen: Previously BG levels were well-controlled on current treatment regimen with better compliance to diet.  He admits in the past 6 months he has been noncompliant with diet.  He plans to make dietary changes.  Advised for now he continue current treatment and send a blood glucose log in 2 weeks with a checking twice a day.  If blood glucose levels are improving with dietary changes then we may be able to continue the current treatment otherwise adjustments will need to be made.  Episodes of hypoglycemia can lead to permanent disability and death.  Discussed risks/complications associated with uncontrolled diabetes.    Advised to adhere to diabetic diet, and recommended staying active/exercising routinely as tolerated.  Keep carbohydrates consistent to limit blood glucose fluctuations.  Advised to call if blood sugars less than 70 mg/dl or over 300 mg/dl.   Check blood glucose 2 times a day  Discussed symptoms and treatment of hypoglycemia.   Recommended routine follow-up with podiatry and ophthalmology.   Send log in 2 weeks.    Ordered blood work to complete prior to next visit.    Lab Results   Component Value Date    HGBA1C 8.2 (H) 2025       Orders:    Hemoglobin A1C; Future    Albumin / creatinine urine ratio; Future    Basic metabolic panel; Future    Benign essential hypertension  Blood pressure adequately controlled, continue current treatment       Mixed hyperlipidemia  LDL 59  On statin therapy        Other diabetic neurological complication associated with type 2 diabetes mellitus (HCC)    Lab Results   Component Value Date     HGBA1C 8.2 (H) 06/02/2025                History of Present Illness     Tomas Cabrera is a 74 y.o. male with a history of type 2 diabetes without long term use of insulin. Diabetes course has been stable. Complications of DM: CAD, neuropathy. Denies recent illness or hospitalizations. Denies recent severe hypoglycemic or severe hyperglycemic episodes. Denies any issues with his current regimen. Home glucose monitoring: are performed sporadically     Home blood glucose readings:   mg/dl (sporadic readings)     Current regimen: metformin 1000 mg BID, glimepiride  2 mg with breakfast and 4 mg with dinner, liraglutide (Victoza) 1.8 mg daily, Jardiance 10 mg daily  compliant most of the time, denies any side effects from medications.  Injects in: abdomen. Rotates sites: Yes  Hypoglycemic episodes: No, rare  H/o of hypoglycemia causing hospitalization or Intervention such as glucagon injection  or ambulance call :  No  Hypoglycemia symptoms: jitteriness  Treatment of hypoglycemia: candy      Medic alert tag: recommended: Yes     Diet: 3 meals per day, 3+ snacks per day. Timing of meals is predictable.   Diabetic diet compliance:  noncompliant some of the time; states in the past 6 months he did eat too many sweets and his activity was low.  Activity: Daily activity is predictable: Yes. He is trying to go for walks weather permitting.     Has hypertension: on ACE inhibitor/ARB, compliant most of the time  Has hyperlipidemia: on statin - tolerating well, no myalgias. compliant most of the time, denies any side effects from medications.  Thyroid disorders: No  History of pancreatitis: No    Review of Systems   Constitutional:  Negative for activity change, appetite change, fatigue and unexpected weight change.   HENT:  Negative for trouble swallowing.    Eyes:  Negative for visual disturbance.   Respiratory:  Negative for shortness of breath.    Cardiovascular:  Negative for chest pain and palpitations.  "  Gastrointestinal:  Negative for constipation and diarrhea.   Endocrine: Negative for polydipsia and polyuria.   Musculoskeletal:  Positive for back pain (stiffness).   Skin:  Negative for wound.   Neurological:  Positive for numbness.   Psychiatric/Behavioral: Negative.      as per HPI    Medications Ordered Prior to Encounter[1]      Medical History Reviewed by provider this encounter:  Tobacco  Allergies  Meds  Problems  Med Hx  Surg Hx  Fam Hx     .    Objective   /76   Pulse 93   Ht 6' 1\" (1.854 m)   Wt 118 kg (260 lb)   SpO2 97%   BMI 34.30 kg/m²      Body mass index is 34.3 kg/m².  Wt Readings from Last 3 Encounters:   06/18/25 118 kg (260 lb)   06/18/25 118 kg (260 lb)   12/26/24 114 kg (251 lb)     Physical Exam  Vitals and nursing note reviewed.   Constitutional:       Appearance: He is well-developed.   HENT:      Head: Normocephalic.     Eyes:      General: No scleral icterus.    Neck:      Thyroid: No thyromegaly.     Cardiovascular:      Rate and Rhythm: Normal rate and regular rhythm.      Pulses:           Radial pulses are 2+ on the right side and 2+ on the left side.      Heart sounds: No murmur heard.  Pulmonary:      Effort: Pulmonary effort is normal. No respiratory distress.      Breath sounds: Normal breath sounds. No wheezing.     Musculoskeletal:      Cervical back: Neck supple.     Skin:     General: Skin is warm and dry.     Neurological:      Mental Status: He is alert.       Last Eye Exam: 03/11/2024. Has a visit in July 2025.  Last Foot Exam: 04/24/2024  Health Maintenance   Topic Date Due    Diabetic Eye Exam  06/18/2026 (Originally 3/11/2025)    Diabetic Foot Exam  06/18/2026         Labs: I have reviewed pertinent labs including:   Component      Latest Ref Rng 6/2/2025   GLUCOSE      65 - 99 mg/dL 174 (H)    BUN      7 - 25 mg/dL 18    Creatinine      0.70 - 1.28 mg/dL 0.87    GFR, Calculated      > OR = 60 mL/min/1.73m2 91    SL AMB BUN/CREATININE RATIO      6 - " 22 (calc) SEE NOTE:    Sodium      135 - 146 mmol/L 140    Potassium      3.5 - 5.3 mmol/L 4.0    Chloride      98 - 110 mmol/L 103    Carbon Dioxide      20 - 32 mmol/L 26    Calcium      8.6 - 10.3 mg/dL 9.4    Cholesterol      <200 mg/dL 123    HDL      > OR = 40 mg/dL 43    Triglycerides      <150 mg/dL 131    LDL Calculated      mg/dL (calc) 59    Chol/HDL Ratio      <5.0 (calc) 2.9    Non-HDL Cholesterol      <130 mg/dL (calc) 80    EXT Creatinine Urine      20 - 320 mg/dL 61    Albumin,U,Random      See Note: mg/dL 0.6    Albumin Creat Ratio      <30 mg/g creat 10    Vitamin B-12      200 - 1,100 pg/mL 410    Hemoglobin A1C      <5.7 % 8.2 (H)       Legend:  (H) High    Patient Instructions     Hypoglycemia instructions   Tomas Cabrera  6/18/2025  [id number]    Low Blood Sugar    Steps to treat low blood sugar.    1. Test blood sugar if you have symptoms of low blood sugar:   Low Blood Sugar Symptoms:  o Sweaty  o Dizzy  o Rapid heartbeat  o Shaky  o Bad mood  o Hungry      2. Treat blood sugar less than 70 with 15 grams of fast-acting carbohydrate:   Examples of 15 grams Fast-Acting Carbohydrate:  o 4 oz juice  o 4 oz regular soda  o 3-4 glucose tablets (chew)  o 3-4 hard candies (chew)          3.  Wait 15 minutes and test your blood sugar again     4. If blood sugar is less than 100, repeat steps 2-3.    5. When your blood sugar is 100 or more, eat a snack if it will be longer than one hour until your next meal. The snack should be 15 grams of carbohydrate and a protein:   Examples of snacks:  o ½ sandwich  o 6 crackers with cheese  o Piece of fruit with cheese or peanut butter  o 6 crackers with peanut butter      Discussed with the patient and all questioned fully answered. He will call me if any problems arise.    Administrative Statements   I have spent a total time of 30 minutes in caring for this patient on the day of the visit/encounter including Importance of tx compliance, Documenting in the  medical record, Reviewing/placing orders in the medical record (including tests, medications, and/or procedures), and Obtaining or reviewing history  .         [1]   Current Outpatient Medications on File Prior to Visit   Medication Sig Dispense Refill    amLODIPine-benazepril (LOTREL) 10-20 MG per capsule TAKE 1 CAPSULE BY MOUTH EVERY  capsule 1    atorvastatin (LIPITOR) 40 mg tablet TAKE 1 TABLET BY MOUTH EVERY DAY 90 tablet 0    BD Pen Needle Manju 2nd Gen 32G X 4 MM MISC Use daily 100 each 1    Empagliflozin (Jardiance) 10 MG TABS tablet TAKE 1 TABLET BY MOUTH EVERY DAY 90 tablet 0    glimepiride (AMARYL) 2 mg tablet TAKE 1 TABLET BY MOUTH EVERY DAY WITH BREAKFAST AND TAKE 2 TABLETS WITH DINNER 270 tablet 0    glucose blood (OneTouch Verio) test strip Test BG 2x daily as directed 200 each 3    Lancets (OneTouch Delica Plus [id number]) MISC Test BG 2x daily as directed 200 each 3    metFORMIN (GLUCOPHAGE) 1000 MG tablet TAKE 1 TABLET BY MOUTH TWICE A  tablet 0    Naproxen Sodium (ALEVE PO) Take by mouth as needed      brimonidine (ALPHAGAN P) 0.1 % Administer to the right eye 3 (three) times a day (Patient not taking: Reported on 6/18/2025)      [DISCONTINUED] albuterol (ProAir HFA) 90 mcg/act inhaler Inhale 2 puffs every 6 (six) hours as needed for wheezing (cough) (Patient not taking: Reported on 6/18/2025) 8.5 g 0    [DISCONTINUED] ramelteon (ROZEREM) 8 mg tablet Take 1 tablet (8 mg total) by mouth daily at bedtime (Patient not taking: Reported on 6/18/2025) 30 tablet 2    [DISCONTINUED] Victoza injection Inject 1.8 mg under the skin daily (Patient not taking: Reported on 6/18/2025) 27 mL 0     No current facility-administered medications on file prior to visit.

## 2025-06-18 NOTE — TELEPHONE ENCOUNTER
Patient calling in regard to lab orders    States  when he left there was no paper orders    Patient does not get labs completed at [facility]    Patient requesting lab order be mailed

## 2025-07-08 LAB
LEFT EYE DIABETIC RETINOPATHY: NORMAL
RIGHT EYE DIABETIC RETINOPATHY: NORMAL

## 2025-07-11 ENCOUNTER — PATIENT MESSAGE (OUTPATIENT)
Age: 75
End: 2025-07-11

## 2025-07-11 ENCOUNTER — TELEPHONE (OUTPATIENT)
Age: 75
End: 2025-07-11

## 2025-07-11 NOTE — TELEPHONE ENCOUNTER
Pt called in to see if his eye doctors office had faxed the report from his diabetic eye exam yet. I checked chart and advised pt nothing was scanned in yet; he is going to follow up with eye doctors office.

## 2025-07-14 NOTE — TELEPHONE ENCOUNTER
Advised Pt we don't have anything for his recent diabetic eye exam.  He'll call them tomorrow when they are open.

## 2025-07-17 ENCOUNTER — TELEPHONE (OUTPATIENT)
Age: 75
End: 2025-07-17

## 2025-07-18 NOTE — TELEPHONE ENCOUNTER
Reviewed eye exam completion form.     Reviewed BG log. Levels are in acceptable range. He should continue current treatment.

## 2025-07-24 DIAGNOSIS — E11.65 TYPE 2 DIABETES MELLITUS WITH HYPERGLYCEMIA, WITHOUT LONG-TERM CURRENT USE OF INSULIN (HCC): Primary | ICD-10-CM

## 2025-07-24 NOTE — TELEPHONE ENCOUNTER
Patient calling for a refill on victoza patient was told by the pharmacy that it was discontinued by the provider. Patient is still currently taking . Please advise if patient is to continue he will need a refill sent to     [facility] in [place] [id number] pharmacy please advise.

## 2025-07-25 ENCOUNTER — VBI (OUTPATIENT)
Dept: ADMINISTRATIVE | Facility: OTHER | Age: 75
End: 2025-07-25

## 2025-07-25 RX ORDER — LIRAGLUTIDE 6 MG/ML
1.8 INJECTION SUBCUTANEOUS DAILY
Qty: 27 ML | Refills: 1 | Status: SHIPPED | OUTPATIENT
Start: 2025-07-25

## 2025-07-25 NOTE — TELEPHONE ENCOUNTER
Patient called back in regarding his medication for liraglutide (Victoza). He is almost out of it.    He is asking for it to be called into [facility] (not [facility]).

## 2025-07-25 NOTE — TELEPHONE ENCOUNTER
07/25/25 9:37 AM     Chart reviewed for Diabetic Eye Exam was/were submitted to the patient's insurance.     Gladis Larsen MA   [facility]

## 2025-07-28 RX ORDER — LIRAGLUTIDE 6 MG/ML
1.8 INJECTION SUBCUTANEOUS DAILY
Qty: 27 ML | Refills: 0 | OUTPATIENT
Start: 2025-07-28

## (undated) RX ORDER — SODIUM CHLORIDE 50 MG/ML: 1 SOLUTION OPHTHALMIC

## (undated) RX ORDER — BRIMONIDINE TARTRATE 1 MG/ML: 1 SOLUTION/ DROPS OPHTHALMIC